# Patient Record
Sex: FEMALE | Race: WHITE | NOT HISPANIC OR LATINO | Employment: OTHER | URBAN - METROPOLITAN AREA
[De-identification: names, ages, dates, MRNs, and addresses within clinical notes are randomized per-mention and may not be internally consistent; named-entity substitution may affect disease eponyms.]

---

## 2018-06-07 ENCOUNTER — OFFICE VISIT (OUTPATIENT)
Dept: URGENT CARE | Facility: CLINIC | Age: 78
End: 2018-06-07
Payer: COMMERCIAL

## 2018-06-07 VITALS
SYSTOLIC BLOOD PRESSURE: 158 MMHG | RESPIRATION RATE: 16 BRPM | TEMPERATURE: 98.8 F | OXYGEN SATURATION: 96 % | HEART RATE: 72 BPM | DIASTOLIC BLOOD PRESSURE: 77 MMHG

## 2018-06-07 DIAGNOSIS — J20.9 ACUTE BRONCHITIS, UNSPECIFIED ORGANISM: Primary | ICD-10-CM

## 2018-06-07 PROCEDURE — 99213 OFFICE O/P EST LOW 20 MIN: CPT | Performed by: PHYSICIAN ASSISTANT

## 2018-06-07 RX ORDER — AZITHROMYCIN 250 MG/1
TABLET, FILM COATED ORAL
Qty: 6 TABLET | Refills: 0 | Status: SHIPPED | OUTPATIENT
Start: 2018-06-07 | End: 2018-06-11

## 2018-06-07 RX ORDER — ALBUTEROL SULFATE 90 UG/1
2 AEROSOL, METERED RESPIRATORY (INHALATION) EVERY 4 HOURS PRN
Qty: 1 INHALER | Refills: 0 | Status: SHIPPED | OUTPATIENT
Start: 2018-06-07 | End: 2018-07-07

## 2018-06-07 NOTE — PATIENT INSTRUCTIONS

## 2018-06-07 NOTE — PROGRESS NOTES
[facility]        NAME: Jesusita Thomas is a 78 y o  female  : 1940    MRN: [id number]  DATE: 2018  TIME: 4:31 PM    Assessment and Plan   Acute bronchitis, unspecified organism [J20 9]  1  Acute bronchitis, unspecified organism  azithromycin (ZITHROMAX) 250 mg tablet    albuterol (PROVENTIL HFA,VENTOLIN HFA) 90 mcg/act inhaler    Spacer Device for Inhaler         Patient Instructions       Follow up with PCP in 3-5 days  Proceed to  ER if symptoms worsen  Chief Complaint     Chief Complaint   Patient presents with    Cough     Pt c/o a cough and congestion since yesterday  History of Present Illness       Patient presents with 3 day history of a cough  She states the cough is productive of colored mucus intermittently  She denies any chest pain shortness of breath dyspnea on exertion  Her daughter last night stated that she did hear some wheezing and her cough got worse while sleeping  She also stated that she felt warm last night and had possible fever  Review of Systems   Review of Systems   Constitutional: Negative for activity change, appetite change and chills  HENT: Positive for congestion  Negative for ear pain, postnasal drip, rhinorrhea, sinus pressure, sore throat and trouble swallowing  Eyes: Negative for redness  Respiratory: Positive for cough and wheezing  Negative for chest tightness and shortness of breath  Cardiovascular: Negative for chest pain and palpitations  Gastrointestinal: Negative for abdominal pain, diarrhea, nausea and vomiting  Musculoskeletal: Negative for myalgias  Skin: Negative for rash  Neurological: Negative for dizziness and headaches  Hematological: Negative for adenopathy           Current Medications       Current Outpatient Prescriptions:     TEMAZEPAM PO, Take by mouth, Disp: , Rfl:     albuterol (PROVENTIL HFA,VENTOLIN HFA) 90 mcg/act inhaler, Inhale 2 puffs every 4 (four) hours as needed for wheezing or shortness of breath for up to 30 days, Disp: 1 Inhaler, Rfl: 0    azithromycin (ZITHROMAX) 250 mg tablet, Take 2 tablets today then 1 tablet daily x 4 days, Disp: 6 tablet, Rfl: 0    Current Allergies     Allergies as of 06/07/2018 - Reviewed 06/07/2018   Allergen Reaction Noted    Penicillins  06/07/2018            The following portions of the patient's history were reviewed and updated as appropriate: allergies, current medications, past family history, past medical history, past social history, past surgical history and problem list      No past medical history on file  No past surgical history on file  No family history on file  Medications have been verified  Objective   /77   Pulse 72   Temp 98 8 °F (37 1 °C)   Resp 16   SpO2 96%        Physical Exam     Physical Exam   Constitutional: She is oriented to person, place, and time  She appears well-developed and well-nourished  HENT:   Head: Normocephalic and atraumatic  Right Ear: External ear normal    Nose: Nose normal    Mouth/Throat: Oropharynx is clear and moist    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

## 2018-09-24 ENCOUNTER — APPOINTMENT (OUTPATIENT)
Dept: LAB | Facility: HOSPITAL | Age: 78
End: 2018-09-24
Payer: COMMERCIAL

## 2018-09-24 ENCOUNTER — TRANSCRIBE ORDERS (OUTPATIENT)
Dept: ADMINISTRATIVE | Facility: HOSPITAL | Age: 78
End: 2018-09-24

## 2018-09-24 DIAGNOSIS — I10 ESSENTIAL HYPERTENSION, MALIGNANT: ICD-10-CM

## 2018-09-24 DIAGNOSIS — F45.8 ANXIETY HYPERVENTILATION: ICD-10-CM

## 2018-09-24 DIAGNOSIS — I10 ESSENTIAL HYPERTENSION, MALIGNANT: Primary | ICD-10-CM

## 2018-09-24 DIAGNOSIS — F41.9 ANXIETY HYPERVENTILATION: ICD-10-CM

## 2018-09-24 LAB
25(OH)D3 SERPL-MCNC: 22.2 NG/ML (ref 30–100)
ALBUMIN SERPL BCP-MCNC: 4.2 G/DL (ref 3.5–5.7)
ALP SERPL-CCNC: 64 U/L (ref 55–165)
ALT SERPL W P-5'-P-CCNC: 10 U/L (ref 7–52)
ANION GAP SERPL CALCULATED.3IONS-SCNC: 9 MMOL/L (ref 4–13)
AST SERPL W P-5'-P-CCNC: 25 U/L (ref 13–39)
BILIRUB SERPL-MCNC: 0.6 MG/DL (ref 0.2–1)
BUN SERPL-MCNC: 16 MG/DL (ref 7–25)
CALCIUM SERPL-MCNC: 9.7 MG/DL (ref 8.6–10.5)
CHLORIDE SERPL-SCNC: 105 MMOL/L (ref 98–107)
CHOLEST SERPL-MCNC: 174 MG/DL (ref 0–200)
CO2 SERPL-SCNC: 28 MMOL/L (ref 21–31)
CREAT SERPL-MCNC: 0.96 MG/DL (ref 0.6–1.2)
ERYTHROCYTE [DISTWIDTH] IN BLOOD BY AUTOMATED COUNT: 13.9 % (ref 11.5–14.5)
GFR SERPL CREATININE-BSD FRML MDRD: 58 ML/MIN/1.73SQ M
GLUCOSE P FAST SERPL-MCNC: 96 MG/DL (ref 65–99)
HCT VFR BLD AUTO: 41.3 % (ref 34.8–46.1)
HDLC SERPL-MCNC: 44 MG/DL (ref 40–60)
HGB BLD-MCNC: 13.8 G/DL (ref 12–16)
LDLC SERPL CALC-MCNC: 105 MG/DL (ref 75–193)
MCH RBC QN AUTO: 30.4 PG (ref 26–34)
MCHC RBC AUTO-ENTMCNC: 33.6 G/DL (ref 31–37)
MCV RBC AUTO: 91 FL (ref 81–99)
NONHDLC SERPL-MCNC: 130 MG/DL
PLATELET # BLD AUTO: 217 THOUSANDS/UL (ref 149–390)
PMV BLD AUTO: 8.3 FL (ref 8.6–11.7)
POTASSIUM SERPL-SCNC: 3.9 MMOL/L (ref 3.5–5.5)
PROT SERPL-MCNC: 6.8 G/DL (ref 6.4–8.9)
RBC # BLD AUTO: 4.55 MILLION/UL (ref 3.9–5.2)
SODIUM SERPL-SCNC: 142 MMOL/L (ref 134–143)
TRIGL SERPL-MCNC: 126 MG/DL (ref 44–166)
TSH SERPL DL<=0.05 MIU/L-ACNC: 1.66 UIU/ML (ref 0.45–5.33)
WBC # BLD AUTO: 6 THOUSAND/UL (ref 4.8–10.8)

## 2018-09-24 PROCEDURE — 80061 LIPID PANEL: CPT

## 2018-09-24 PROCEDURE — 36415 COLL VENOUS BLD VENIPUNCTURE: CPT

## 2018-09-24 PROCEDURE — 80053 COMPREHEN METABOLIC PANEL: CPT

## 2018-09-24 PROCEDURE — 84443 ASSAY THYROID STIM HORMONE: CPT

## 2018-09-24 PROCEDURE — 85027 COMPLETE CBC AUTOMATED: CPT

## 2018-09-24 PROCEDURE — 82306 VITAMIN D 25 HYDROXY: CPT

## 2019-11-14 ENCOUNTER — HOSPITAL ENCOUNTER (INPATIENT)
Facility: HOSPITAL | Age: 79
LOS: 6 days | Discharge: NURSING, REHAB OR LONG-TERM CARE | DRG: 480 | End: 2019-11-20
Attending: EMERGENCY MEDICINE | Admitting: HOSPITALIST
Payer: COMMERCIAL

## 2019-11-14 ENCOUNTER — APPOINTMENT (EMERGENCY)
Dept: RADIOLOGY | Facility: HOSPITAL | Age: 79
DRG: 480 | End: 2019-11-14
Payer: COMMERCIAL

## 2019-11-14 ENCOUNTER — APPOINTMENT (EMERGENCY)
Dept: CT IMAGING | Facility: HOSPITAL | Age: 79
DRG: 480 | End: 2019-11-14
Payer: COMMERCIAL

## 2019-11-14 DIAGNOSIS — S72.009A HIP FRACTURE (HCC): Primary | ICD-10-CM

## 2019-11-14 DIAGNOSIS — G47.00 INSOMNIA, UNSPECIFIED TYPE: ICD-10-CM

## 2019-11-14 DIAGNOSIS — I10 BENIGN ESSENTIAL HTN: Chronic | ICD-10-CM

## 2019-11-14 DIAGNOSIS — S72.002A CLOSED FRACTURE OF LEFT HIP, INITIAL ENCOUNTER (HCC): ICD-10-CM
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
## 2019-11-14 LAB
ANION GAP SERPL CALCULATED.3IONS-SCNC: 13 MMOL/L (ref 4–13)
BUN SERPL-MCNC: 13 MG/DL (ref 7–25)
CALCIUM SERPL-MCNC: 9.6 MG/DL (ref 8.6–10.5)
CHLORIDE SERPL-SCNC: 105 MMOL/L (ref 98–107)
CO2 SERPL-SCNC: 24 MMOL/L (ref 21–31)
CREAT SERPL-MCNC: 1.01 MG/DL (ref 0.6–1.2)
ERYTHROCYTE [DISTWIDTH] IN BLOOD BY AUTOMATED COUNT: 14 % (ref 11.5–14.5)
GFR SERPL CREATININE-BSD FRML MDRD: 54 ML/MIN/1.73SQ M
GLUCOSE SERPL-MCNC: 154 MG/DL (ref 65–99)
HCT VFR BLD AUTO: 36.9 % (ref 42–47)
HGB BLD-MCNC: 12.4 G/DL (ref 12–16)
MCH RBC QN AUTO: 30.4 PG (ref 26–34)
MCHC RBC AUTO-ENTMCNC: 33.7 G/DL (ref 31–37)
MCV RBC AUTO: 90 FL (ref 81–99)
PLATELET # BLD AUTO: 207 THOUSANDS/UL (ref 149–390)
PMV BLD AUTO: 8.9 FL (ref 8.6–11.7)
POTASSIUM SERPL-SCNC: 4.2 MMOL/L (ref 3.5–5.5)
RBC # BLD AUTO: 4.09 MILLION/UL (ref 3.9–5.2)
SODIUM SERPL-SCNC: 142 MMOL/L (ref 134–143)
WBC # BLD AUTO: 10.1 THOUSAND/UL (ref 4.8–10.8)

## 2019-11-14 PROCEDURE — 73502 X-RAY EXAM HIP UNI 2-3 VIEWS: CPT

## 2019-11-14 PROCEDURE — 80048 BASIC METABOLIC PNL TOTAL CA: CPT | Performed by: NURSE PRACTITIONER

## 2019-11-14 PROCEDURE — 99285 EMERGENCY DEPT VISIT HI MDM: CPT | Performed by: NURSE PRACTITIONER

## 2019-11-14 PROCEDURE — 73700 CT LOWER EXTREMITY W/O DYE: CPT

## 2019-11-14 PROCEDURE — 99223 1ST HOSP IP/OBS HIGH 75: CPT | Performed by: PHYSICIAN ASSISTANT

## 2019-11-14 PROCEDURE — 85027 COMPLETE CBC AUTOMATED: CPT | Performed by: PHYSICIAN ASSISTANT

## 2019-11-14 PROCEDURE — 73560 X-RAY EXAM OF KNEE 1 OR 2: CPT

## 2019-11-14 PROCEDURE — 36415 COLL VENOUS BLD VENIPUNCTURE: CPT | Performed by: NURSE PRACTITIONER

## 2019-11-14 PROCEDURE — 99285 EMERGENCY DEPT VISIT HI MDM: CPT

## 2019-11-14 PROCEDURE — 73620 X-RAY EXAM OF FOOT: CPT

## 2019-11-14 PROCEDURE — 96374 THER/PROPH/DIAG INJ IV PUSH: CPT

## 2019-11-14 RX ORDER — METHOCARBAMOL 500 MG/1
500 TABLET, FILM COATED ORAL ONCE
Status: COMPLETED | OUTPATIENT
Start: 2019-11-14 | End: 2019-11-14

## 2019-11-14 RX ORDER — ONDANSETRON 2 MG/ML
4 INJECTION INTRAMUSCULAR; INTRAVENOUS EVERY 6 HOURS PRN
Status: DISCONTINUED | OUTPATIENT
Start: 2019-11-14 | End: 2019-11-20 | Stop reason: HOSPADM

## 2019-11-14 RX ORDER — HYDROCODONE BITARTRATE AND ACETAMINOPHEN 5; 325 MG/1; MG/1
1 TABLET ORAL EVERY 6 HOURS PRN
Status: DISCONTINUED | OUTPATIENT
Start: 2019-11-14 | End: 2019-11-19

## 2019-11-14 RX ORDER — HYDROMORPHONE HCL/PF 1 MG/ML
0.5 SYRINGE (ML) INJECTION
Status: DISCONTINUED | OUTPATIENT
Start: 2019-11-14 | End: 2019-11-14

## 2019-11-14 RX ORDER — ACETAMINOPHEN 325 MG/1
975 TABLET ORAL EVERY 8 HOURS SCHEDULED
Status: DISCONTINUED | OUTPATIENT
Start: 2019-11-14 | End: 2019-11-18

## 2019-11-14 RX ORDER — BENAZEPRIL HYDROCHLORIDE 10 MG/1
10 TABLET ORAL DAILY
Status: DISCONTINUED | OUTPATIENT
Start: 2019-11-15 | End: 2019-11-15

## 2019-11-14 RX ORDER — GABAPENTIN 100 MG/1
100 CAPSULE ORAL 3 TIMES DAILY
Status: DISCONTINUED | OUTPATIENT
Start: 2019-11-14 | End: 2019-11-20 | Stop reason: HOSPADM

## 2019-11-14 RX ORDER — ACETAMINOPHEN 325 MG/1
650 TABLET ORAL EVERY 6 HOURS PRN
Status: DISCONTINUED | OUTPATIENT
Start: 2019-11-14 | End: 2019-11-20 | Stop reason: HOSPADM

## 2019-11-14 RX ORDER — SODIUM CHLORIDE 9 MG/ML
75 INJECTION, SOLUTION INTRAVENOUS CONTINUOUS
Status: DISCONTINUED | OUTPATIENT
Start: 2019-11-15 | End: 2019-11-15

## 2019-11-14 RX ADMIN — HYDROMORPHONE HYDROCHLORIDE 0.5 MG: 1 INJECTION, SOLUTION INTRAMUSCULAR; INTRAVENOUS; SUBCUTANEOUS at 16:57

## 2019-11-14 RX ADMIN — HYDROMORPHONE HYDROCHLORIDE 0.5 MG: 1 INJECTION, SOLUTION INTRAMUSCULAR; INTRAVENOUS; SUBCUTANEOUS at 22:58

## 2019-11-14 RX ADMIN — HYDROMORPHONE HYDROCHLORIDE 0.5 MG: 1 INJECTION, SOLUTION INTRAMUSCULAR; INTRAVENOUS; SUBCUTANEOUS at 15:30

## 2019-11-14 RX ADMIN — ACETAMINOPHEN 975 MG: 325 TABLET ORAL at 23:00

## 2019-11-14 RX ADMIN — ACETAMINOPHEN 975 MG: 325 TABLET ORAL at 13:50

## 2019-11-14 RX ADMIN — METHOCARBAMOL TABLETS 500 MG: 500 TABLET, COATED ORAL at 13:50

## 2019-11-14 RX ADMIN — SODIUM CHLORIDE 75 ML/HR: 9 INJECTION, SOLUTION INTRAVENOUS at 16:58

## 2019-11-14 RX ADMIN — SODIUM CHLORIDE 75 ML/HR: 9 INJECTION, SOLUTION INTRAVENOUS at 20:21

## 2019-11-14 RX ADMIN — GABAPENTIN 100 MG: 100 CAPSULE ORAL at 20:25

## 2019-11-14 NOTE — ED PROVIDER NOTES
History  Chief Complaint   Patient presents with    Fall     fall while at [address]  left hip and upper leg pain  69 y/o female at [address] and stepped on a bottom shelf to reach an item  Abby Kaplan backwards and landed on her left hip  Immediate pain, no blood thinners, previous surgery on left knee, HTN, otherwise healthy  No complaint of neck or back pain  Also complaining of left foot pain, pulses are palpable  Spoke with daughter regarding mothers condition, she resides with her and stated she does no tolerate opiods  Prior to Admission Medications   Prescriptions Last Dose Informant Patient Reported? Taking? BENAZEPRIL HCL PO   Yes Yes   Sig: Take by mouth   TEMAZEPAM PO   Yes No   Sig: Take by mouth      Facility-Administered Medications: None       Past Medical History:   Diagnosis Date    Hypertension        History reviewed  No pertinent surgical history  History reviewed  No pertinent family history  I have reviewed and agree with the history as documented  Social History     Tobacco Use    Smoking status: Never Smoker    Smokeless tobacco: Never Used   Substance Use Topics    Alcohol use: Not Currently    Drug use: Never        Review of Systems   Constitutional: Positive for activity change  Negative for appetite change, chills, diaphoresis, fatigue, fever and unexpected weight change  Eyes: Negative  Respiratory: Negative  Negative for wheezing  Cardiovascular: Negative  Gastrointestinal: Negative  Endocrine: Negative  Musculoskeletal: Negative for joint swelling, neck pain and neck stiffness  Left hip externally rotated and shortened, patient refusing to move hip secondary to pain  Also complains of left foot pain  Neurovascularly intact   Allergic/Immunologic: Negative  Neurological: Negative  Hematological: Negative  Psychiatric/Behavioral: Negative          Physical Exam  Physical Exam   Constitutional: She is oriented to person, place, and time  She appears well-developed and well-nourished  No distress  HENT:   Head: Normocephalic and atraumatic  Right Ear: External ear normal    Left Ear: External ear normal    Nose: Nose normal    Mouth/Throat: Oropharynx is clear and moist  No oropharyngeal exudate  Eyes: Pupils are equal, round, and reactive to light  Conjunctivae and EOM are normal  Right eye exhibits no discharge  Left eye exhibits no discharge  No scleral icterus  Neck: Normal range of motion  Neck supple  No JVD present  No tracheal deviation present  No thyromegaly present  Cardiovascular: Normal rate, regular rhythm, normal heart sounds and intact distal pulses  Exam reveals no gallop and no friction rub  No murmur heard  Pulmonary/Chest: Effort normal and breath sounds normal  No stridor  No respiratory distress  She has no wheezes  She has no rales  She exhibits no tenderness  Abdominal: Soft  Bowel sounds are normal  She exhibits no distension and no mass  There is no tenderness  There is no rebound and no guarding  No hernia  Genitourinary:   Genitourinary Comments: deferred   Musculoskeletal: She exhibits no edema, tenderness or deformity  Lymphadenopathy:     She has no cervical adenopathy  Neurological: She is alert and oriented to person, place, and time  She displays normal reflexes  No cranial nerve deficit or sensory deficit  She exhibits normal muscle tone  Coordination normal    Skin: Skin is warm and dry  Capillary refill takes less than 2 seconds  No rash noted  She is not diaphoretic  No erythema  No pallor  Psychiatric: She has a normal mood and affect   Her behavior is normal  Judgment and thought content normal        Vital Signs  ED Triage Vitals [11/14/19 1318]   Temperature Pulse Respirations Blood Pressure SpO2   (!) 97 2 °F (36 2 °C) 69 20 (!) 199/88 99 %      Temp Source Heart Rate Source Patient Position - Orthostatic VS BP Location FiO2 (%)   Temporal Monitor Lying Left arm --      Pain Score       9           Vitals:    11/14/19 1318   BP: (!) 199/88   Pulse: 69   Patient Position - Orthostatic VS: Lying         Visual Acuity  Visual Acuity      Most Recent Value   L Pupil Size (mm)  3   R Pupil Size (mm)  3          ED Medications  Medications - No data to display    Diagnostic Studies  Results Reviewed     None                 XR hip/pelv 2-3 vws left if performed    (Results Pending)              Procedures  Procedures       ED Course                               MDM  Number of Diagnoses or Management Options  Diagnosis management comments: 71 y/o female was shopping at [employer] and stood up on bottom shelf to reach for something and fell backwards landing on left hip resulting in a comminuted fracture of the intertrochanteric femoral neck  Pain currently controlled and is resting  Spoke with Dr Taye Cortez who is aware of the patient and asked for her to be admitted, going to Dr Laurel Parmar service and ORtho will evaluate in the morning  Amount and/or Complexity of Data Reviewed  Tests in the radiology section of CPT®: reviewed        Disposition  Final diagnoses:   None     ED Disposition     None      Follow-up Information    None         Patient's Medications   Discharge Prescriptions    No medications on file     No discharge procedures on file      ED Provider  Electronically Signed by           WAGNER Amor  11/14/19 0797

## 2019-11-15 ENCOUNTER — ANESTHESIA EVENT (INPATIENT)
Dept: PERIOP | Facility: HOSPITAL | Age: 79
DRG: 480 | End: 2019-11-15
Payer: COMMERCIAL

## 2019-11-15 ENCOUNTER — ANESTHESIA (INPATIENT)
Dept: PERIOP | Facility: HOSPITAL | Age: 79
DRG: 480 | End: 2019-11-15
Payer: COMMERCIAL

## 2019-11-15 ENCOUNTER — APPOINTMENT (INPATIENT)
Dept: RADIOLOGY | Facility: HOSPITAL | Age: 79
DRG: 480 | End: 2019-11-15
Payer: COMMERCIAL

## 2019-11-15 ENCOUNTER — ANESTHESIA (OUTPATIENT)
Dept: ANESTHESIOLOGY | Facility: HOSPITAL | Age: 79
End: 2019-11-15

## 2019-11-15 ENCOUNTER — TELEPHONE (OUTPATIENT)
Dept: OBGYN CLINIC | Facility: HOSPITAL | Age: 79
End: 2019-11-15

## 2019-11-15 ENCOUNTER — ANESTHESIA EVENT (OUTPATIENT)
Dept: ANESTHESIOLOGY | Facility: HOSPITAL | Age: 79
End: 2019-11-15

## 2019-11-15 PROBLEM — D62 ACUTE BLOOD LOSS ANEMIA: Status: ACTIVE | Noted: 2019-11-15

## 2019-11-15 PROBLEM — S72.002A CLOSED FRACTURE OF LEFT HIP (HCC): Status: ACTIVE | Noted: 2019-11-14

## 2019-11-15 LAB
ABO GROUP BLD: NORMAL
ATRIAL RATE: 68 BPM
BLD GP AB SCN SERPL QL: NEGATIVE
ERYTHROCYTE [DISTWIDTH] IN BLOOD BY AUTOMATED COUNT: 13.9 % (ref 11.5–14.5)
ERYTHROCYTE [DISTWIDTH] IN BLOOD BY AUTOMATED COUNT: 14.1 % (ref 11.5–14.5)
HCT VFR BLD AUTO: 24.3 % (ref 42–47)
HCT VFR BLD AUTO: 31.4 % (ref 42–47)
HCT VFR BLD AUTO: 34.2 % (ref 42–47)
HGB BLD-MCNC: 10.4 G/DL (ref 12–16)
HGB BLD-MCNC: 11.6 G/DL (ref 12–16)
HGB BLD-MCNC: 8 G/DL (ref 12–16)
MCH RBC QN AUTO: 30.4 PG (ref 26–34)
MCH RBC QN AUTO: 30.5 PG (ref 26–34)
MCHC RBC AUTO-ENTMCNC: 32.9 G/DL (ref 31–37)
MCHC RBC AUTO-ENTMCNC: 34 G/DL (ref 31–37)
MCV RBC AUTO: 90 FL (ref 81–99)
MCV RBC AUTO: 92 FL (ref 81–99)
P AXIS: 29 DEGREES
PLATELET # BLD AUTO: 185 THOUSANDS/UL (ref 149–390)
PLATELET # BLD AUTO: 198 THOUSANDS/UL (ref 149–390)
PMV BLD AUTO: 8.4 FL (ref 8.6–11.7)
PMV BLD AUTO: 8.5 FL (ref 8.6–11.7)
PR INTERVAL: 134 MS
QRS AXIS: 1 DEGREES
QRSD INTERVAL: 84 MS
QT INTERVAL: 388 MS
QTC INTERVAL: 412 MS
RBC # BLD AUTO: 2.63 MILLION/UL (ref 3.9–5.2)
RBC # BLD AUTO: 3.81 MILLION/UL (ref 3.9–5.2)
RH BLD: POSITIVE
SPECIMEN EXPIRATION DATE: NORMAL
T WAVE AXIS: 19 DEGREES
VENTRICULAR RATE: 68 BPM
WBC # BLD AUTO: 16.5 THOUSAND/UL (ref 4.8–10.8)
WBC # BLD AUTO: 9 THOUSAND/UL (ref 4.8–10.8)

## 2019-11-15 PROCEDURE — C1713 ANCHOR/SCREW BN/BN,TIS/BN: HCPCS | Performed by: ORTHOPAEDIC SURGERY

## 2019-11-15 PROCEDURE — 0QS706Z REPOSITION LEFT UPPER FEMUR WITH INTRAMEDULLARY INTERNAL FIXATION DEVICE, OPEN APPROACH: ICD-10-PCS | Performed by: ORTHOPAEDIC SURGERY

## 2019-11-15 PROCEDURE — 93005 ELECTROCARDIOGRAM TRACING: CPT

## 2019-11-15 PROCEDURE — 85027 COMPLETE CBC AUTOMATED: CPT | Performed by: NURSE PRACTITIONER

## 2019-11-15 PROCEDURE — 30233N1 TRANSFUSION OF NONAUTOLOGOUS RED BLOOD CELLS INTO PERIPHERAL VEIN, PERCUTANEOUS APPROACH: ICD-10-PCS | Performed by: HOSPITALIST

## 2019-11-15 PROCEDURE — 85018 HEMOGLOBIN: CPT | Performed by: NURSE PRACTITIONER

## 2019-11-15 PROCEDURE — 73502 X-RAY EXAM HIP UNI 2-3 VIEWS: CPT | Performed by: ORTHOPAEDIC SURGERY

## 2019-11-15 PROCEDURE — 86850 RBC ANTIBODY SCREEN: CPT | Performed by: ORTHOPAEDIC SURGERY

## 2019-11-15 PROCEDURE — 93010 ELECTROCARDIOGRAM REPORT: CPT | Performed by: INTERNAL MEDICINE

## 2019-11-15 PROCEDURE — 99232 SBSQ HOSP IP/OBS MODERATE 35: CPT | Performed by: NURSE PRACTITIONER

## 2019-11-15 PROCEDURE — 30233N1 TRANSFUSION OF NONAUTOLOGOUS RED BLOOD CELLS INTO PERIPHERAL VEIN, PERCUTANEOUS APPROACH: ICD-10-PCS | Performed by: ANESTHESIOLOGY

## 2019-11-15 PROCEDURE — 73502 X-RAY EXAM HIP UNI 2-3 VIEWS: CPT

## 2019-11-15 PROCEDURE — 86920 COMPATIBILITY TEST SPIN: CPT

## 2019-11-15 PROCEDURE — 86901 BLOOD TYPING SEROLOGIC RH(D): CPT | Performed by: ORTHOPAEDIC SURGERY

## 2019-11-15 PROCEDURE — 86900 BLOOD TYPING SEROLOGIC ABO: CPT | Performed by: ORTHOPAEDIC SURGERY

## 2019-11-15 PROCEDURE — P9016 RBC LEUKOCYTES REDUCED: HCPCS

## 2019-11-15 PROCEDURE — 27245 TREAT THIGH FRACTURE: CPT | Performed by: ORTHOPAEDIC SURGERY

## 2019-11-15 PROCEDURE — 99222 1ST HOSP IP/OBS MODERATE 55: CPT | Performed by: ORTHOPAEDIC SURGERY

## 2019-11-15 PROCEDURE — 85014 HEMATOCRIT: CPT | Performed by: NURSE PRACTITIONER

## 2019-11-15 PROCEDURE — 85027 COMPLETE CBC AUTOMATED: CPT | Performed by: ANESTHESIOLOGY

## 2019-11-15 PROCEDURE — C1769 GUIDE WIRE: HCPCS | Performed by: ORTHOPAEDIC SURGERY

## 2019-11-15 DEVICE — TFNA HELICAL BLADE 95MM
Type: IMPLANTABLE DEVICE | Site: LEG | Status: FUNCTIONAL
Brand: TFN-ADVANCE

## 2019-11-15 DEVICE — 11MM/125 DEG TI CANN TROCH FIXATION NAIL 380MM/LEFT-STER: Type: IMPLANTABLE DEVICE | Site: LEG | Status: FUNCTIONAL

## 2019-11-15 DEVICE — 5.0MM TI LOCKING SCREW 38MM- FOR IM NAILS: Type: IMPLANTABLE DEVICE | Site: LEG | Status: FUNCTIONAL

## 2019-11-15 RX ORDER — EPHEDRINE SULFATE 50 MG/ML
INJECTION INTRAVENOUS AS NEEDED
Status: DISCONTINUED | OUTPATIENT
Start: 2019-11-15 | End: 2019-11-15 | Stop reason: SURG

## 2019-11-15 RX ORDER — EPHEDRINE SULFATE 50 MG/ML
10 INJECTION INTRAVENOUS
Status: DISCONTINUED | OUTPATIENT
Start: 2019-11-15 | End: 2019-11-16 | Stop reason: HOSPADM

## 2019-11-15 RX ORDER — DEXAMETHASONE SODIUM PHOSPHATE 10 MG/ML
INJECTION, SOLUTION INTRAMUSCULAR; INTRAVENOUS AS NEEDED
Status: DISCONTINUED | OUTPATIENT
Start: 2019-11-15 | End: 2019-11-15 | Stop reason: SURG

## 2019-11-15 RX ORDER — HYDROMORPHONE HCL/PF 1 MG/ML
0.5 SYRINGE (ML) INJECTION
Status: DISCONTINUED | OUTPATIENT
Start: 2019-11-15 | End: 2019-11-15 | Stop reason: HOSPADM

## 2019-11-15 RX ORDER — SODIUM CHLORIDE, SODIUM LACTATE, POTASSIUM CHLORIDE, CALCIUM CHLORIDE 600; 310; 30; 20 MG/100ML; MG/100ML; MG/100ML; MG/100ML
100 INJECTION, SOLUTION INTRAVENOUS CONTINUOUS
Status: DISCONTINUED | OUTPATIENT
Start: 2019-11-15 | End: 2019-11-15

## 2019-11-15 RX ORDER — SODIUM CHLORIDE, SODIUM LACTATE, POTASSIUM CHLORIDE, CALCIUM CHLORIDE 600; 310; 30; 20 MG/100ML; MG/100ML; MG/100ML; MG/100ML
INJECTION, SOLUTION INTRAVENOUS CONTINUOUS PRN
Status: DISCONTINUED | OUTPATIENT
Start: 2019-11-15 | End: 2019-11-15 | Stop reason: SURG

## 2019-11-15 RX ORDER — LIDOCAINE HYDROCHLORIDE 20 MG/ML
INJECTION, SOLUTION EPIDURAL; INFILTRATION; INTRACAUDAL; PERINEURAL AS NEEDED
Status: DISCONTINUED | OUTPATIENT
Start: 2019-11-15 | End: 2019-11-15 | Stop reason: SURG

## 2019-11-15 RX ORDER — BUPIVACAINE HYDROCHLORIDE AND EPINEPHRINE 5; 5 MG/ML; UG/ML
INJECTION, SOLUTION EPIDURAL; INTRACAUDAL; PERINEURAL AS NEEDED
Status: DISCONTINUED | OUTPATIENT
Start: 2019-11-15 | End: 2019-11-15 | Stop reason: HOSPADM

## 2019-11-15 RX ORDER — SODIUM CHLORIDE, SODIUM LACTATE, POTASSIUM CHLORIDE, CALCIUM CHLORIDE 600; 310; 30; 20 MG/100ML; MG/100ML; MG/100ML; MG/100ML
125 INJECTION, SOLUTION INTRAVENOUS CONTINUOUS
Status: DISCONTINUED | OUTPATIENT
Start: 2019-11-15 | End: 2019-11-15

## 2019-11-15 RX ORDER — CEFAZOLIN SODIUM 2 G/50ML
2000 SOLUTION INTRAVENOUS ONCE
Status: COMPLETED | OUTPATIENT
Start: 2019-11-15 | End: 2019-11-16

## 2019-11-15 RX ORDER — MIDAZOLAM HYDROCHLORIDE 2 MG/2ML
INJECTION, SOLUTION INTRAMUSCULAR; INTRAVENOUS AS NEEDED
Status: DISCONTINUED | OUTPATIENT
Start: 2019-11-15 | End: 2019-11-15 | Stop reason: SURG

## 2019-11-15 RX ORDER — SODIUM CHLORIDE, SODIUM LACTATE, POTASSIUM CHLORIDE, CALCIUM CHLORIDE 600; 310; 30; 20 MG/100ML; MG/100ML; MG/100ML; MG/100ML
INJECTION, SOLUTION INTRAVENOUS CONTINUOUS PRN
Status: DISCONTINUED | OUTPATIENT
Start: 2019-11-15 | End: 2019-11-15

## 2019-11-15 RX ORDER — ROCURONIUM BROMIDE 10 MG/ML
INJECTION, SOLUTION INTRAVENOUS AS NEEDED
Status: DISCONTINUED | OUTPATIENT
Start: 2019-11-15 | End: 2019-11-15 | Stop reason: SURG

## 2019-11-15 RX ORDER — ONDANSETRON 2 MG/ML
INJECTION INTRAMUSCULAR; INTRAVENOUS AS NEEDED
Status: DISCONTINUED | OUTPATIENT
Start: 2019-11-15 | End: 2019-11-15 | Stop reason: SURG

## 2019-11-15 RX ORDER — FENTANYL CITRATE/PF 50 MCG/ML
50 SYRINGE (ML) INJECTION 2 TIMES DAILY PRN
Status: DISCONTINUED | OUTPATIENT
Start: 2019-11-15 | End: 2019-11-15 | Stop reason: HOSPADM

## 2019-11-15 RX ORDER — LISINOPRIL 20 MG/1
20 TABLET ORAL DAILY
Status: DISCONTINUED | OUTPATIENT
Start: 2019-11-15 | End: 2019-11-15

## 2019-11-15 RX ORDER — CEFAZOLIN SODIUM 1 G/3ML
INJECTION, POWDER, FOR SOLUTION INTRAMUSCULAR; INTRAVENOUS AS NEEDED
Status: DISCONTINUED | OUTPATIENT
Start: 2019-11-15 | End: 2019-11-15 | Stop reason: SURG

## 2019-11-15 RX ORDER — CEFAZOLIN SODIUM 2 G/50ML
2000 SOLUTION INTRAVENOUS EVERY 8 HOURS
Status: DISCONTINUED | OUTPATIENT
Start: 2019-11-16 | End: 2019-11-16

## 2019-11-15 RX ORDER — MEPERIDINE HYDROCHLORIDE 50 MG/ML
12.5 INJECTION INTRAMUSCULAR; INTRAVENOUS; SUBCUTANEOUS
Status: DISCONTINUED | OUTPATIENT
Start: 2019-11-15 | End: 2019-11-15 | Stop reason: HOSPADM

## 2019-11-15 RX ORDER — PROPOFOL 10 MG/ML
INJECTION, EMULSION INTRAVENOUS AS NEEDED
Status: DISCONTINUED | OUTPATIENT
Start: 2019-11-15 | End: 2019-11-15 | Stop reason: SURG

## 2019-11-15 RX ORDER — ONDANSETRON 2 MG/ML
4 INJECTION INTRAMUSCULAR; INTRAVENOUS ONCE AS NEEDED
Status: DISCONTINUED | OUTPATIENT
Start: 2019-11-15 | End: 2019-11-15 | Stop reason: HOSPADM

## 2019-11-15 RX ORDER — KETOROLAC TROMETHAMINE 30 MG/ML
15 INJECTION, SOLUTION INTRAMUSCULAR; INTRAVENOUS ONCE AS NEEDED
Status: DISCONTINUED | OUTPATIENT
Start: 2019-11-15 | End: 2019-11-15 | Stop reason: HOSPADM

## 2019-11-15 RX ORDER — SODIUM CHLORIDE, SODIUM LACTATE, POTASSIUM CHLORIDE, CALCIUM CHLORIDE 600; 310; 30; 20 MG/100ML; MG/100ML; MG/100ML; MG/100ML
100 INJECTION, SOLUTION INTRAVENOUS CONTINUOUS
Status: DISCONTINUED | OUTPATIENT
Start: 2019-11-15 | End: 2019-11-16

## 2019-11-15 RX ORDER — FENTANYL CITRATE 50 UG/ML
INJECTION, SOLUTION INTRAMUSCULAR; INTRAVENOUS AS NEEDED
Status: DISCONTINUED | OUTPATIENT
Start: 2019-11-15 | End: 2019-11-15 | Stop reason: SURG

## 2019-11-15 RX ADMIN — EPHEDRINE SULFATE 10 MG: 50 INJECTION, SOLUTION INTRAVENOUS at 13:32

## 2019-11-15 RX ADMIN — SODIUM CHLORIDE, SODIUM LACTATE, POTASSIUM CHLORIDE, AND CALCIUM CHLORIDE: .6; .31; .03; .02 INJECTION, SOLUTION INTRAVENOUS at 14:50

## 2019-11-15 RX ADMIN — PHENYLEPHRINE HYDROCHLORIDE 100 MCG: 10 INJECTION INTRAVENOUS at 14:35

## 2019-11-15 RX ADMIN — SODIUM CHLORIDE 75 ML/HR: 9 INJECTION, SOLUTION INTRAVENOUS at 09:26

## 2019-11-15 RX ADMIN — SUGAMMADEX 200 MG: 100 INJECTION, SOLUTION INTRAVENOUS at 14:55

## 2019-11-15 RX ADMIN — CEFAZOLIN 2000 MG: 1 INJECTION, POWDER, FOR SOLUTION INTRAMUSCULAR; INTRAVENOUS at 13:09

## 2019-11-15 RX ADMIN — SODIUM CHLORIDE, POTASSIUM CHLORIDE, SODIUM LACTATE AND CALCIUM CHLORIDE 100 ML/HR: 600; 310; 30; 20 INJECTION, SOLUTION INTRAVENOUS at 19:32

## 2019-11-15 RX ADMIN — GABAPENTIN 100 MG: 100 CAPSULE ORAL at 20:47

## 2019-11-15 RX ADMIN — ROCURONIUM BROMIDE 10 MG: 10 INJECTION INTRAVENOUS at 14:06

## 2019-11-15 RX ADMIN — CEFAZOLIN SODIUM 2000 MG: 2 SOLUTION INTRAVENOUS at 19:31

## 2019-11-15 RX ADMIN — ROCURONIUM BROMIDE 40 MG: 10 INJECTION INTRAVENOUS at 13:04

## 2019-11-15 RX ADMIN — FENTANYL CITRATE 50 MCG: 50 INJECTION INTRAMUSCULAR; INTRAVENOUS at 13:20

## 2019-11-15 RX ADMIN — SODIUM CHLORIDE: 9 INJECTION, SOLUTION INTRAVENOUS at 13:52

## 2019-11-15 RX ADMIN — ONDANSETRON 4 MG: 2 INJECTION INTRAMUSCULAR; INTRAVENOUS at 13:51

## 2019-11-15 RX ADMIN — NOREPINEPHRINE BITARTRATE 5 MCG/MIN: 1 INJECTION INTRAVENOUS at 19:33

## 2019-11-15 RX ADMIN — EPHEDRINE SULFATE 10 MG: 50 INJECTION, SOLUTION INTRAVENOUS at 16:37

## 2019-11-15 RX ADMIN — FENTANYL CITRATE 50 MCG: 50 INJECTION INTRAMUSCULAR; INTRAVENOUS at 13:04

## 2019-11-15 RX ADMIN — EPHEDRINE SULFATE 10 MG: 50 INJECTION, SOLUTION INTRAVENOUS at 14:03

## 2019-11-15 RX ADMIN — LISINOPRIL 20 MG: 20 TABLET ORAL at 09:26

## 2019-11-15 RX ADMIN — LIDOCAINE HYDROCHLORIDE 100 MG: 20 INJECTION, SOLUTION EPIDURAL; INFILTRATION; INTRACAUDAL; PERINEURAL at 13:04

## 2019-11-15 RX ADMIN — PROPOFOL 100 MG: 10 INJECTION, EMULSION INTRAVENOUS at 13:04

## 2019-11-15 RX ADMIN — ACETAMINOPHEN 975 MG: 325 TABLET ORAL at 20:47

## 2019-11-15 RX ADMIN — PHENYLEPHRINE HYDROCHLORIDE 100 MCG: 10 INJECTION INTRAVENOUS at 14:20

## 2019-11-15 RX ADMIN — MIDAZOLAM HYDROCHLORIDE 2 MG: 1 INJECTION, SOLUTION INTRAMUSCULAR; INTRAVENOUS at 12:59

## 2019-11-15 RX ADMIN — PHENYLEPHRINE HYDROCHLORIDE 100 MCG: 10 INJECTION INTRAVENOUS at 14:45

## 2019-11-15 RX ADMIN — DEXAMETHASONE SODIUM PHOSPHATE 10 MG: 10 INJECTION, SOLUTION INTRAMUSCULAR; INTRAVENOUS at 13:51

## 2019-11-15 RX ADMIN — EPHEDRINE SULFATE 10 MG: 50 INJECTION, SOLUTION INTRAVENOUS at 13:59

## 2019-11-15 NOTE — ASSESSMENT & PLAN NOTE
· Admit to Medicine  · Give pain control p r n   · DVT prophylaxis will be SCDs only at this time till cleared by Orthopedic surgery  · Consult Orthopedics in a m   · NPO after midnight

## 2019-11-15 NOTE — ASSESSMENT & PLAN NOTE
· Secondary to an acute blood loss anemia with a component of a dilutional effect from the IV fluids that the patient was receiving  · Status post 2 units of PRBCs  · Hemoglobin is 8 9  · Patient is now hemodynamically stable  · Continue to monitor H&H

## 2019-11-15 NOTE — TELEPHONE ENCOUNTER
Sent a page to Dr Jaimes How:  Levon Mittal @ [facility] ICU questioning orders    Please call [phone]

## 2019-11-15 NOTE — SOCIAL WORK
CM met with pt at bedside and explained role  CM also spoke with pt daughter Sharri Stevens via phone to discuss discharge planning  Pt lives with her daughter in a trailer, ramp outside  Pt does not use an assistive to device to ambulate  She has RW and w/c at home form previous use  Pt reports she is completely independent with ADLs  She is active and continues to drive  Pt PCP is Dr Stevenson Guadalupe  She uses [employer] for medications and denies any barriers to obtaining them  Pt denies any history of HHC,STR, MH and D&A treatment  Pt s/p fall with left hip fracture  Pt having surgery today  STR will be recommended on discharge  Cm discussed same with pt and daughter Vipin Morin  They are in agreement with same  A post acute care recommendation was made by your care team for STR  Discussed Freedom of Choice with both patient and caregiver  List of facilities given to both patient and caregiver via in person  both patient and caregiver aware the list is custom filtered for them by preference  and that [facility] post acute providers are designated  They chose [facility]  Referral made  Pt and daughter are admant they do not want SNF if [facility] declines  Will take pt home with 24 hour family support and HHC  CM to follow  CM reviewed d/c planning process including the following: identifying help at home, patient preference for d/c planning needs, availability of treatment team to discuss questions or concerns patient and/or family may have regarding understanding medications and recognizing signs and symptoms once discharged  CM also encouraged patient to follow up with all recommended appointments after discharge  Patient advised of importance for patient and family to participate in managing patients medical well being

## 2019-11-15 NOTE — ASSESSMENT & PLAN NOTE
This is simple mechanical falls patient lost her balance while climbing to to something off the top shelf at [facility], will consult PT OT in a m

## 2019-11-15 NOTE — PLAN OF CARE
Problem: Potential for Falls  Goal: Patient will remain free of falls  Description  INTERVENTIONS:  - Assess patient frequently for physical needs  -  Identify cognitive and physical deficits and behaviors that affect risk of falls    -  [place] fall precautions as indicated by assessment   - Educate patient/family on patient safety including physical limitations  - Instruct patient to call for assistance with activity based on assessment  - Modify environment to reduce risk of injury  - Consider OT/PT consult to assist with strengthening/mobility  Outcome: Progressing

## 2019-11-15 NOTE — UTILIZATION REVIEW
Initial Clinical Review    Admission: Date/Time/Statement: Inpatient Admission Orders (From admission, onward)     Ordered        11/14/19 1744  Inpatient Admission  Once         11/14/19 1646  Inpatient Admission  Once                   Orders Placed This Encounter   Procedures    Inpatient Admission     Standing Status:   Standing     Number of Occurrences:   1     Order Specific Question:   Admitting Physician     Answer:   Barbara Simental [[id number]]     Order Specific Question:   Level of Care     Answer:   Med Surg [[id number]]     Order Specific Question:   Estimated length of stay     Answer:   More than 2 Midnights     Order Specific Question:   Certification     Answer:   I certify that inpatient services are medically necessary for this patient for a duration of greater than two midnights  See H&P and MD Progress Notes for additional information about the patient's course of treatment  ED Arrival Information     Expected Arrival Acuity Means of Arrival Escorted By Service Admission Type    - 11/14/2019 13:17 Urgent Ambulance [facility] Urgent    Arrival Complaint    hip pain fell        Chief Complaint   Patient presents with    Fall     fall while at [facility]  left hip and upper leg pain  Assessment/Plan: 70year old female to the ED from store via EMS after sustaining fall, injuring her left hip/thigh  Admitted to inpatient for closed left hip fracture  She was standing on a shelf, at the store, fell back, landing on her left side with immediate pain  UPon arrival to the ED found to have left hip externally rotated and shortened  Good pedal pulses  Given IV dilaudid in the ED with good pain relief  NPO after MN  Orthos consult      ED Triage Vitals [11/14/19 1318]   Temperature Pulse Respirations Blood Pressure SpO2   (!) 97 2 °F (36 2 °C) 69 20 (!) 199/88 99 %      Temp Source Heart Rate Source Patient Position - Orthostatic VS BP Location FiO2 (%)   Temporal Monitor Lying Left arm --      Pain Score       9        Wt Readings from Last 1 Encounters:   11/14/19 63 8 kg (140 lb 10 5 oz)     Additional Vital Signs:   Date/Time  Temp  Pulse  Resp  BP  SpO2  O2 Device  Patient Position - Orthostatic VS   11/15/19 0926        166/70         11/15/19 0638  100 3 °F (37 9 °C)  72  18  169/73  99 %  None (Room air)  Lying   11/14/19 2321  99 5 °F (37 5 °C)  74  18  176/74Abnormal   98 %  None (Room air)  Lying   11/14/19 1915    Jerri Other    None (Room air)     11/14/19 1855  98 8 °F (37 1 °C)  64  18  167/70  100 %         Pertinent Labs/Diagnostic Test Results:   Xray hip/pelvis 11/14:  Comminuted impacted left intertrochanteric femoral neck fracture  No evidence of dislocation  Xray left foot 11/14: Limited evaluation secondary to limited positioning and lack of an oblique view  No evident fracture  If symptoms persist dedicated 3 view foot series should be considered given exam limitations  Xray left knee 11/14: Limited evaluation secondary to positioning  No acute osseous abnormality  CT hip 11/14:  Comminuted impacted fracture of the intertrochanteric left femoral neck with apex anterior and lateral angulation    Results from last 7 days   Lab Units 11/15/19  0532 11/14/19  2229   WBC Thousand/uL 9 00 10 10   HEMOGLOBIN g/dL 11 6* 12 4   HEMATOCRIT % 34 2* 36 9*   PLATELETS Thousands/uL 198 207     Results from last 7 days   Lab Units 11/14/19  1647   SODIUM mmol/L 142   POTASSIUM mmol/L 4 2   CHLORIDE mmol/L 105   CO2 mmol/L 24   ANION GAP mmol/L 13   BUN mg/dL 13   CREATININE mg/dL 1 01   EGFR ml/min/1 73sq m 54   CALCIUM mg/dL 9 6       Results from last 7 days   Lab Units 11/14/19  1647   GLUCOSE RANDOM mg/dL 154*     ED Treatment:   Medication Administration from 11/14/2019 1317 to 11/14/2019 1833       Date/Time Order Dose Route Action     11/14/2019 1350 methocarbamol (ROBAXIN) tablet 500 mg 500 mg Oral Given     11/14/2019 1350 acetaminophen (TYLENOL) tablet 975 mg 975 mg Oral Given     11/14/2019 1657 HYDROmorphone (DILAUDID) injection 0 5 mg 0 5 mg Intravenous Given     11/14/2019 1530 HYDROmorphone (DILAUDID) injection 0 5 mg 0 5 mg Intravenous Given     11/14/2019 1658 sodium chloride 0 9 % infusion 75 mL/hr Intravenous New Bag        Past Medical History:   Diagnosis Date    Hypertension      Admitting Diagnosis: Hip fracture ([facility] [address] ) [S72 009A]  Hip injury [S79 919A]  Age/Sex: 70 y o  female  Admission Orders:  SCDS  Xary hip/pelvis  ONPO  Scheduled Medications:    Medications:  [MAR Hold] acetaminophen 975 mg Oral Q8H [facility]   [MAR Hold] gabapentin 100 mg Oral TID   [MAR Hold] lisinopril 20 mg Oral Daily     Continuous IV Infusions:    sodium chloride 75 mL/hr Intravenous Continuous     PRN Meds:    [MAR Hold] acetaminophen 650 mg Oral Q6H PRN   [MAR Hold] HYDROcodone-acetaminophen 1 tablet Oral Q6H PRN   [MAR Hold] morphine injection 2 mg Intravenous Q4H PRN   [MAR Hold] ondansetron 4 mg Intravenous Q6H PRN       IP CONSULT TO ORTHOPEDIC SURGERY    Network Utilization Review Department  [email or web address]  ATTENTION: Please call with any questions or concerns to [phone] and carefully listen to the prompts so that you are directed to the right person  All voicemails are confidential   Kervin Zaragoza all requests for admission clinical reviews, approved or denied determinations and any other requests to dedicated fax number below belonging to the campus where the patient is receiving treatment    FACILITY NAME UR FAX NUMBER   ADMISSION DENIALS (Administrative/Medical Necessity) [phone]   PARENT CHILD HEALTH (Maternity/NICU/Pediatrics) [phone]   [facility] [phone]   Farooq Grooms [phone]   Nora Superior [address] [facility] [phone]   Contreras Maud [address] [place] [address] [place] [address] [phone]

## 2019-11-15 NOTE — CONSULTS
Consultation - Orthopedics   Jessica Moore 70 y o  female MRN: [id number]  Unit/Bed#: OR [place] Encounter: [id number]      Assessment/Plan     Assessment:  Left hip fracture    Plan:  61-year-old female with left subtrochanteric hip fracture  I spoke with the patient and her son regarding treatment recommendations  I recommended surgical reduction and fixation  Risks and benefits of surgery were discussed with her  Questions were answered  Informed consent was obtained  Plan will be for surgery on the afternoon of November 15th  History of Present Illness      Physician Requesting Consult: Arlene Ruiz MD     Reason for Consult / Principal Problem:  Hip fracture    HPI: Jessica Moore is a 70y o  year old female who presents with pain in her left hip  On November 14, 2019 she fell at [facility] when she was reaching to get something off of a high shelf  She fell directly onto her left hip  She came into the emergency department where she was found to have a left hip fracture and was admitted by Internal Medicine  Her pain has been fairly well controlled  She does not usually use a cane or a walker  She is a community ambulator  She lives with her daughter  Her son is with her in addition to her daughter-in-law  Inpatient consult to Orthopedic Surgery  Consult performed by: Renee Lewis MD  Consult ordered by: Shrieen Ann PA-C          Review of Systems   Constitutional: Negative  HENT: Negative  Eyes: Negative  Respiratory: Negative  Cardiovascular: Negative  Gastrointestinal: Negative  Endocrine: Negative  Genitourinary: Negative  Musculoskeletal: Positive for arthralgias  Skin: Negative  Allergic/Immunologic: Negative  Neurological: Negative  Hematological: Negative  Psychiatric/Behavioral: Negative  Historical Information   Past Medical History:   Diagnosis Date    Hypertension      History reviewed   No pertinent surgical history  Social History   Social History     Substance and Sexual Activity   Alcohol Use Not Currently     Social History     Substance and Sexual Activity   Drug Use Never     Social History     Tobacco Use   Smoking Status Never Smoker   Smokeless Tobacco Never Used     Family History: non-contributory    Meds/Allergies   all current active meds have been reviewed and current meds:   Current Facility-Administered Medications   Medication Dose Route Frequency    [MAR Hold] acetaminophen (TYLENOL) tablet 650 mg  650 mg Oral Q6H PRN    [MAR Hold] acetaminophen (TYLENOL) tablet 975 mg  975 mg Oral Q8H [address]    fentaNYL (SUBLIMAZE) injection 50 mcg  50 mcg Intravenous BID PRN    [MAR Hold] gabapentin (NEURONTIN) capsule 100 mg  100 mg Oral TID    [MAR Hold] HYDROcodone-acetaminophen (NORCO) 5-325 mg per tablet 1 tablet  1 tablet Oral Q6H PRN    HYDROmorphone (DILAUDID) injection 0 5 mg  0 5 mg Intravenous Q5 Min PRN    ketorolac (TORADOL) injection 15 mg  15 mg Intravenous Once PRN    lactated ringers infusion  100 mL/hr Intravenous Continuous    [MAR Hold] lisinopril (ZESTRIL) tablet 20 mg  20 mg Oral Daily    meperidine (DEMEROL) injection 12 5 mg  12 5 mg Intravenous Q10 Min PRN    [MAR Hold] morphine injection 2 mg  2 mg Intravenous Q4H PRN    [MAR Hold] ondansetron (ZOFRAN) injection 4 mg  4 mg Intravenous Q6H PRN    ondansetron (ZOFRAN) injection 4 mg  4 mg Intravenous Once PRN    sodium chloride 0 9 % infusion  75 mL/hr Intravenous Continuous     Allergies   Allergen Reactions    Penicillins        Objective   Vitals: Blood pressure 166/70, pulse 72, temperature (!) (P) 97 1 °F (36 2 °C), resp  rate (P) 20, height 5' 4" (1 626 m), weight 63 8 kg (140 lb 10 5 oz), SpO2 99 %  ,Body mass index is 24 14 kg/m²  Intake/Output Summary (Last 24 hours) at 11/15/2019 1522  Last data filed at 11/15/2019 1458  Gross per 24 hour   Intake 2700 ml   Output 2100 ml   Net 600 ml     I/O last 24 hours:   In: 2700 [I V :2700]  Out: 2100 [Urine:1800; Blood:300]    Invasive Devices     Peripheral Intravenous Line            Peripheral IV 11/14/19 Right Antecubital 1 day          Drain            Urethral Catheter Straight-tip 16 Fr  1 day                Physical Exam   Constitutional: She is oriented to person, place, and time  She appears well-developed and well-nourished  No distress  HENT:   Head: Normocephalic and atraumatic  Eyes: Right eye exhibits no discharge  Left eye exhibits no discharge  Neck: Normal range of motion  Neck supple  No tracheal deviation present  No thyromegaly present  Cardiovascular: Normal rate, regular rhythm and intact distal pulses  Pulmonary/Chest: Effort normal and breath sounds normal  No respiratory distress  Abdominal: Soft  She exhibits no distension  There is no tenderness  Neurological: She is alert and oriented to person, place, and time  She exhibits normal muscle tone  Skin: Skin is warm and dry  Capillary refill takes less than 2 seconds  She is not diaphoretic  No erythema  Psychiatric: She has a normal mood and affect  Her behavior is normal  Thought content normal      Right Hip Exam   Right hip exam is normal      Tenderness   The patient is experiencing no tenderness  Range of Motion   The patient has normal right hip ROM  Muscle Strength   The patient has normal right hip strength  Tests   VILMA: negative    Other   Erythema: absent  Sensation: normal  Pulse: present      Left Hip Exam     Comments:  Patient's left lower extremity is held flexed and externally rotated  Thigh is full but soft  Pain with hip range of motion  Palpable dorsal pedis post   Flexion and extension of the toes and the ankles intact  Brisk cap refill of all toes  Sensation light touch intact in the superficial and deep peroneal, sural, and saphenous nerve distribution  No lacerations or abrasions on the left lower extremity              Lab Results:   I have personally reviewed pertinent lab results  CBC:   Lab Results   Component Value Date    WBC 9 00 11/15/2019    HGB 11 6 (L) 11/15/2019    HCT 34 2 (L) 11/15/2019    MCV 90 11/15/2019     11/15/2019    MCH 30 5 11/15/2019    MCHC 34 0 11/15/2019    RDW 13 9 11/15/2019    MPV 8 5 (L) 11/15/2019     CMP:   Lab Results   Component Value Date    SODIUM 142 11/14/2019     11/14/2019    CO2 24 11/14/2019    BUN 13 11/14/2019    CREATININE 1 01 11/14/2019    CALCIUM 9 6 11/14/2019    EGFR 54 11/14/2019       Imaging Studies: I have personally reviewed pertinent films in PACS and X-rays show left hip subtrochanteric hip fracture with some intertrochanteric extension  The fracture site was flexed and externally rotated  EKG, Pathology, and Other Studies: I have personally reviewed pertinent reports      VTE Prophylaxis: Reason for no pharmacologic prophylaxis Preop

## 2019-11-15 NOTE — PLAN OF CARE
Problem: Potential for Falls  Goal: Patient will remain free of falls  Description  INTERVENTIONS:  - Assess patient frequently for physical needs  -  Identify cognitive and physical deficits and behaviors that affect risk of falls    -  [place] fall precautions as indicated by assessment   - Educate patient/family on patient safety including physical limitations  - Instruct patient to call for assistance with activity based on assessment  - Modify environment to reduce risk of injury  - Consider OT/PT consult to assist with strengthening/mobility  Outcome: Progressing

## 2019-11-15 NOTE — ANESTHESIA PREPROCEDURE EVALUATION
Review of Systems/Medical History  Patient summary reviewed  Chart reviewed      Cardiovascular  EKG reviewed, Hypertension ,    Pulmonary       GI/Hepatic            Endo/Other     GYN       Hematology   Musculoskeletal       Neurology   Psychology         Lab Results   Component Value Date    WBC 9 00 11/15/2019    HGB 11 6 (L) 11/15/2019    HCT 34 2 (L) 11/15/2019    MCV 90 11/15/2019     11/15/2019     Lab Results   Component Value Date    CALCIUM 9 6 11/14/2019    K 4 2 11/14/2019    CO2 24 11/14/2019     11/14/2019    BUN 13 11/14/2019    CREATININE 1 01 11/14/2019     No results found for: INR, PROTIME  No results found for: PTT    Physical Exam    Airway    Mallampati score: III  TM Distance: >3 FB  Neck ROM: full     Dental   upper dentures,     Cardiovascular  Cardiovascular exam normal    Pulmonary  Pulmonary exam normal     Other Findings        Anesthesia Plan  ASA Score- 3 Emergent    Anesthesia Type- spinal with ASA Monitors  Additional Monitors:   Airway Plan:     Comment: I personally discussed risks and benefits to this anesthetic  All patient questions were answered  Pt agrees with anesthesia plan        Plan Factors-    Induction- intravenous  Postoperative Plan- Plan for postoperative opioid use  Informed Consent- Anesthetic plan and risks discussed with patient  I personally reviewed this patient with the CRNA  Discussed and agreed on the Anesthesia Plan with the CRNA  Lamont Mayen

## 2019-11-15 NOTE — OP NOTE
Orthopedics INSERTION NAIL IM FEMUR ANTEGRADE (TROCHANTERIC)  Op Note      Pre-op Diagnosis:   Closed fracture of left hip, subtrochanteric    Post-op Diagnosis:  Same with basicervical extension    Procedure:  Left hip open reduction with long cephalomedullary nail placement    Surgeon:  Surgeon(s):  Cathie Geiger MD   I was present for the entire procedure    Anesthesia:  GETA    Estimated Blood Loss:  300 mL    Material sent to lab:  None      Complications:  None    Findings:  Primarily subtroch fracture pattern, once the subtroch fracture was reduced basicervical was noted  Following fixation the fracture was stable within adequate alignment and fixation  Indications:  A 70 y o  y/o female with an left intertrochanteric hip fracture  Treatment options were discussed, and the recommendation was made for an ORIF of the hip  Risks and benefits of surgery were discussed which included but were not limited to continued infection, malunion, nonunion, neurovascular damage, need to return to the OR, failure of the implants, symptomatic hardware, need for blood transfusion, wound healing problems, stiffness, failure of heart or lungs  Informed consent was obtained  Procedure: The patient was met preoperatively and the left hip was identified and signed  The patient was taken back to the operating room where a General endotracheal was performed  Patient was positioned on the fracture table with the left leg in traction and the contralateral leg in the well leg lucia  Flouro was brought in to confirm good alignment and visualization  Once the was acceptable alignment of the fracture the leg was sterilely prepped and draped in the usual fashion  A timeout was held identifying the patient, side, site, antibiotics, and allergies  The patient received Ancef  preoperatively  Prior to making incision I was unable to get the fracture reduced on the fracture table with the proximal fragment flexed    Just distal to the fracture all longitudinal incision was made  I dissected down through the ITB band to the fracture site  I was able to easily get a Walters over the anterior aspect of the proximal fracture, in using this as leverage get the fracture nearly anatomically reduced  Once I was pleased that we had adequate alignment I proceeded  A 3 cm incision was make proximal to the greater trochanter  I disscected down to the greater troch, and using imaging placed a guide pin down the tip of the greater troch  Once pleased with the placement of the pin, the entry reamer was used  I then placed a long guide pin  I sequentially reamed starting at an 8 5 mm Reamer to a 12 5  The nail, Synthes TFN size [id number], 125 degree, was placed down the canal   It was advanced until I was please with the alignment of the lag screw with the femoral head  At this point the Walters was removed, but the cervical neck displaced  The Lerry Brace was replaced and held in position until the lag blade was inserted  The initial incision was then used and the guide for the lag screw was placed down to bone  The guide wire was placed, and once pleased with the location and length it was measured, reamed, and a 95 mm blade was placed  Once of excision was completed proximally the guide was removed  The traction was removed and the leg was abducted  Fluoroscopy was brought in for perfect circles of the distal femur  Using a knife, drill and fluoroscopy as guidance I drilled through the distal interlocking screw and placed a distal interlocking screw without difficulty  Final images confirmed adequate alignment of the fracture with good postioning and length of the nail and screws  The incisions were thoroughly irrigated out  Deep fascia was closed with 0-vicryl  Skin closed with 2-0 vicryl and staples  A sterile dressing was placed  Disposition:  Patient tolerated the procedure well and was taken to recovery postoperatively      Plan:  - Patient will be WBAT  - Dressing change POD#2  - Will follow and monitor H/H- transfusion will be deferred to Internal Medicine, primary service  - PT/OT  - Lovenox for DVT prophylaxis with foot pumps  - Xrays at 6 weeks post op  - follow-up with Dr Bailey Beavers two weeks postop     @[facility]@     Date: 11/15/2019  Time: 3:01 PM

## 2019-11-15 NOTE — DISCHARGE INSTRUCTIONS
For the hip fracture:  - weight-bearing as tolerated to the left lower extremity, no range-of-motion restrictions  - Lovenox for two weeks, 40 mg subcu daily then may transition to 325 mg of aspirin b i d   For one month  - daily dressing changes as needed  - Keep incision clean and dry until staples removed by Orthopedics

## 2019-11-15 NOTE — ASSESSMENT & PLAN NOTE
Patient is on benazepril at home though she is unsure of dose, will start on 10 mg p o  Daily and adjust based on blood pressure until home dose can be obtained

## 2019-11-15 NOTE — ANESTHESIA POSTPROCEDURE EVALUATION
Post-Op Assessment Note    CV Status:  Stable  Pain Score: 4    Pain management: adequate     Mental Status:  Alert and awake   Hydration Status:  Euvolemic   PONV Controlled:  Controlled   Airway Patency:  Patent   Post Op Vitals Reviewed: Yes      Staff: Anesthesiologist           BP   120/73   Temp   97   Pulse  110   Resp   22   SpO2   99

## 2019-11-15 NOTE — H&P
H&P- Remington Cano 1940, 70 y o  female MRN: [id number]    Unit/Bed#: -01 Encounter: [id number]    Primary Care Provider: Monqiue Ventura DO   Date and time admitted to hospital: 11/14/2019  1:19 PM        * Closed left hip fracture [facility])  Assessment & Plan  · Admit to Medicine  · Give pain control p r n   · DVT prophylaxis will be SCDs only at this time till cleared by Orthopedic surgery  · Consult Orthopedics in a m   · NPO after midnight    Fall from ground level  Assessment & Plan  This is simple mechanical falls patient lost her balance while climbing to to something off the top shelf at [employer], will consult PT OT in a m  Benign essential HTN  Assessment & Plan  Patient is on benazepril at home though she is unsure of dose, will start on 10 mg p o  Daily and adjust based on blood pressure until home dose can be obtained  VTE Prophylaxis: SCDs only at this time till cleared by Orthopedic surgery  Code Status:  Level 1  POLST: There is no POLST form on file for this patient (pre-hospital)  Discussion with family:  None present at bedside at time of exam    Anticipated Length of Stay:  Patient will be admitted on an Inpatient basis with an anticipated length of stay of  > 2 midnights  Justification for Hospital Stay:  Fall resulting in left hip fracture requiring orthopedic evaluation    Total Time for Visit, including Counseling / Coordination of Care: 1 hour  Greater than 50% of this total time spent on direct patient counseling and coordination of care  Chief Complaint:   Full x1 today with left hip pain    History of Present Illness:    Remington Cano is a 70 y o  female who presents with will x1 today with left hip pain status post fall  Patient was shopping at [employer] when she attempted to get something off the top shelf that she was not quite tall enough to reach so she stood on the bottom shelf attempting to reach it    Patient states when stepping off the shelf her heel caught and lip and she fell landing on her left hip onto a concrete floor  Patient reported 10/10 left hip pain which is currently a 7/10 she states that she was unable to stand after the fall secondary to pain  Patient denies any other trauma though she does note that she has an ecchymosis on her left forearm, she denies any chest pain, dizziness, loss of consciousness  Review of Systems:  Review of Systems   Constitutional: Negative for chills and fever  Respiratory: Negative for cough and shortness of breath  Cardiovascular: Negative for chest pain and palpitations  Gastrointestinal: Negative for diarrhea, nausea and vomiting  Genitourinary: Negative for dysuria, frequency, hematuria and urgency  Musculoskeletal: Positive for arthralgias and gait problem  Neurological: Negative for dizziness, syncope, weakness, light-headedness and headaches  All other systems reviewed and are negative  Past Medical and Surgical History:   Past Medical History:   Diagnosis Date    Hypertension        History reviewed  No pertinent surgical history  Meds/Allergies:  Prior to Admission medications    Medication Sig Start Date End Date Taking? Authorizing Provider   BENAZEPRIL HCL PO Take by mouth   Yes Historical Provider, MD   TEMAZEPAM PO Take by mouth   Yes Historical Provider, MD     I have reviewed home medications with patient personally  Allergies: Allergies   Allergen Reactions    Penicillins        Social History:  Marital Status:     Occupation:  Retired  Patient Pre-hospital Living Situation:  Resides at home with son and daughter-in-law  Patient Pre-hospital Level of Mobility:  Full without assist  Patient Pre-hospital Diet Restrictions:  None  Substance Use History:     Social History     Substance and Sexual Activity   Alcohol Use Not Currently     Social History     Tobacco Use   Smoking Status Never Smoker   Smokeless Tobacco Never Used     Social History     Substance and Sexual Activity   Drug Use Never       Family History:  I have reviewed the patients family history    Physical Exam:   Vitals:   Blood Pressure: 167/70 (11/14/19 1855)  Pulse: 64 (11/14/19 1855)  Temperature: 98 8 °F (37 1 °C) (11/14/19 1855)  Temp Source: Temporal (11/14/19 1855)  Respirations: 18 (11/14/19 1855)  Height: 5' 4" (162 6 cm)(Stated ) (11/14/19 1855)  Weight - Scale: 63 8 kg (140 lb 10 5 oz) (11/14/19 1855)  SpO2: 100 % (11/14/19 1855)    Physical Exam   Constitutional: She is oriented to person, place, and time  She appears well-developed and well-nourished  HENT:   Head: Normocephalic and atraumatic  Mouth/Throat: No oropharyngeal exudate  Eyes: Pupils are equal, round, and reactive to light  EOM are normal  No scleral icterus  Neck: Normal range of motion  Neck supple  No JVD present  Cardiovascular: Normal rate, regular rhythm and normal heart sounds  No murmur heard  Pulmonary/Chest: Effort normal and breath sounds normal  No respiratory distress  She has no wheezes  She has no rales  Abdominal: Soft  Bowel sounds are normal  There is no tenderness  There is no rebound and no guarding  Musculoskeletal: She exhibits no edema  Left hip: She exhibits decreased range of motion and tenderness  Lymphadenopathy:     She has no cervical adenopathy  Neurological: She is alert and oriented to person, place, and time  Skin: Skin is warm and dry  Ecchymosis noted  No rash noted  No erythema  Psychiatric: She has a normal mood and affect  Her behavior is normal    Nursing note and vitals reviewed  Additional Data:   Lab Results: I have personally reviewed pertinent reports  Results from last 7 days   Lab Units 11/14/19  1647   POTASSIUM mmol/L 4 2   CHLORIDE mmol/L 105   CO2 mmol/L 24   BUN mg/dL 13   CREATININE mg/dL 1 01   CALCIUM mg/dL 9 6                   Imaging: I have personally reviewed pertinent reports      CT hip left without contrast   Final Result by Ivone Nicolas MD (11/14 1610)   Comminuted impacted fracture of the intertrochanteric left femoral neck with apex anterior and lateral angulation  Workstation performed: [id number]         XR hip/pelv 2-3 vws left if performed   Final Result by Ivone Nicolas MD (11/14 1552)      Comminuted impacted left intertrochanteric femoral neck fracture  No evidence of dislocation  The study was marked in [facility] for immediate notification  Workstation performed: [id number]         XR foot 2 views LEFT   Final Result by Ivone Nicolas MD (11/14 1538)   Limited evaluation secondary to limited positioning and lack of an oblique view  No evident fracture  If symptoms persist dedicated 3 view foot series should be considered given exam limitations  Workstation performed: [id number]         XR knee 1 or 2 views left   Final Result by Ivone Nicolas MD (11/14 9272)      Limited evaluation secondary to positioning  No acute osseous abnormality  Degenerative changes as described  Workstation performed: [id number]             EKG, Pathology, and Other Studies Reviewed on Admission:   · EKG: N/A    NetAccess/Epic Records Reviewed: Yes     ** Please Note: This note has been constructed using a voice recognition system   **

## 2019-11-15 NOTE — ASSESSMENT & PLAN NOTE
· Orthopedic surgery input appreciated  · The patient underwent a left ORIF of the right hip on 11/15/2019  · Will continue postoperative care per Orthopedic surgery  · Continue local wound care  · PT and OT  · Pain management

## 2019-11-15 NOTE — PROGRESS NOTES
Progress Note - Jose Keating 1940, 70 y o  female MRN: [id number]    Unit/Bed#: ICU 06 Encounter: [id number]    Primary Care Provider: Maximino Romero DO   Date and time admitted to hospital: 11/14/2019  1:19 PM        * Closed left hip fracture [facility])  Assessment & Plan  · Orthopedic surgery input appreciated  · The patient underwent a left ORIF of the right hip on 11/15/2019  · Will continue postoperative care per Orthopedic surgery  · Continue local wound care  · PT and OT  · Pain management        Benign essential HTN  Assessment & Plan  · The patient noted to have SBP 160s and lisinopril was given; she was noted to have SBP 120s prior to surgery   · She was noted to be hypotensive during the surgery and postoperatively  Hemoglobin noted to drop from 10-11 to 8s  · Currently is being transfused- she will be receiving 2 units of packed red blood cells total  · The patient received phenylephrine post-op    · Will hold off on BP medication for now   · If needed overnight will start phenylephrine or levophed  · Continue with IVF after transfusion  Fall from ground level  Assessment & Plan  · This is simple mechanical falls patient lost her balance while climbing to to something off the top shelf at [employer]  · No loss of conscious   · Fall precautions   · PT/OT       VTE Pharmacologic Prophylaxis: Pharmacologic: Enoxaparin (Lovenox) start in AM     Patient Centered Rounds: I have performed bedside rounds with nursing staff today      Discussions with Specialists or Other Care Team Provider: ortho, nursing, PT/OT, CM, pharmacy   Education and Discussions with Family / Patient: patient and daughter     Current Length of Stay: 1 day(s)    Current Patient Status: Inpatient   Certification Statement: The patient will continue to require additional inpatient hospital stay due to left hip fracture     Discharge Plan: pending hospital course     Code Status: Level 1 - Full Code    Subjective:   Having some pain on left hip  Denies any n/v  No dyspnea  Objective:     Vitals:   Temp (24hrs), Av 6 °F (37 °C), Min:97 1 °F (36 2 °C), Max:100 3 °F (37 9 °C)    Temp:  [97 1 °F (36 2 °C)-100 3 °F (37 9 °C)] 98 9 °F (37 2 °C)  HR:  [] 80  Resp:  [12-21] 16  BP: ()/(31-74) 103/54  SpO2:  [97 %-100 %] 99 %  Body mass index is 24 14 kg/m²  Input and Output Summary (last 24 hours): Intake/Output Summary (Last 24 hours) at 11/15/2019 1741  Last data filed at 11/15/2019 1601  Gross per 24 hour   Intake 2700 ml   Output 2550 ml   Net 150 ml       Physical Exam:     Physical Exam   Constitutional: She is oriented to person, place, and time  She appears well-developed and well-nourished  No distress  HENT:   Head: Normocephalic and atraumatic  Mouth/Throat: Oropharynx is clear and moist    Eyes: Pupils are equal, round, and reactive to light  Conjunctivae and EOM are normal    Neck: Normal range of motion  Neck supple  No thyromegaly present  Cardiovascular: Normal rate, regular rhythm and normal heart sounds  Exam reveals no gallop and no friction rub  No murmur heard  Pulmonary/Chest: Effort normal and breath sounds normal  She has no wheezes  She has no rales  Abdominal: Soft  Bowel sounds are normal  She exhibits no distension  There is no tenderness  There is no rebound  Musculoskeletal: She exhibits no edema, tenderness or deformity  Neurological: She is alert and oriented to person, place, and time  No cranial nerve deficit  Skin: Skin is warm and dry  No rash noted  No erythema  Left hip dressing on    Psychiatric: She has a normal mood and affect  Her behavior is normal  Thought content normal    Vitals reviewed        Additional Data:     Labs:    Results from last 7 days   Lab Units 11/15/19  1527   WBC Thousand/uL 16 50*   HEMOGLOBIN g/dL 8 0*   HEMATOCRIT % 24 3*   PLATELETS Thousands/uL 185     Results from last 7 days   Lab Units 19  1647   POTASSIUM mmol/L 4 2 CHLORIDE mmol/L 105   CO2 mmol/L 24   BUN mg/dL 13   CREATININE mg/dL 1 01   CALCIUM mg/dL 9 6                   * I Have Reviewed All Lab Data Listed Above  * Additional Pertinent Lab Tests Reviewed: Clarissa 66 Admission  Reviewed    Imaging:  Imaging Reports Reviewed Today Include: hip xray    Recent Cultures (last 7 days):           Last 24 Hours Medication List:     Current Facility-Administered Medications:  acetaminophen 650 mg Oral Q6H PRN Mily Dior MD   acetaminophen 975 mg Oral [facility] Mily Dior MD   cefazolin 2,000 mg Intravenous Once Mily Dior MD   [START ON 11/16/2019] cefazolin 2,000 mg Intravenous Q8H Mily Dior MD   [START ON 11/16/2019] enoxaparin 40 mg Subcutaneous Q24H [address] Mily Dior MD   ePHEDrine 10 mg Intravenous Q5 Min PRN WAGNER Willoughby   gabapentin 100 mg Oral TID Mily Dior MD   HYDROcodone-acetaminophen 1 tablet Oral Q6H PRN Mily Dior MD   lactated ringers 100 mL/hr Intravenous Continuous WAGNER Willoughby   morphine injection 2 mg Intravenous Q4H PRN Mily Diro MD   norepinephrine 1-30 mcg/min Intravenous Titrated WAGNER Willoughby   ondansetron 4 mg Intravenous Q6H PRN Mily Dior MD        Today, Patient Was Seen By: WAGNER Willoughby    ** Please Note: Dictation voice to text software may have been used in the creation of this document   **

## 2019-11-15 NOTE — PHYSICAL THERAPY NOTE
Physical Therapy Cancellation Note        PT session canceled due to pending orthopedic consult  Pt will be seen once medically appropriate

## 2019-11-15 NOTE — ANESTHESIA PREPROCEDURE EVALUATION
Review of Systems/Medical History  Patient summary reviewed  Chart reviewed      Cardiovascular  Hypertension ,    Pulmonary       GI/Hepatic            Endo/Other     GYN       Hematology   Musculoskeletal       Neurology   Psychology         Lab Results   Component Value Date    WBC 9 00 11/15/2019    HGB 11 6 (L) 11/15/2019    HCT 34 2 (L) 11/15/2019    MCV 90 11/15/2019     11/15/2019     Lab Results   Component Value Date    CALCIUM 9 6 11/14/2019    K 4 2 11/14/2019    CO2 24 11/14/2019     11/14/2019    BUN 13 11/14/2019    CREATININE 1 01 11/14/2019     No results found for: INR, PROTIME  No results found for: PTT    Physical Exam    Airway    Mallampati score: III  TM Distance: >3 FB  Neck ROM: full     Dental   upper dentures and lower dentures,     Cardiovascular  Cardiovascular exam normal    Pulmonary  Pulmonary exam normal     Other Findings        Anesthesia Plan  ASA Score- 3 Emergent    Anesthesia Type- general with ASA Monitors  Additional Monitors:   Airway Plan: ETT  Comment: Surgeon requested muscle relaxation for difficult reduction  I personally discussed risks and benefits to this anesthetic  All patient questions were answered  Pt agrees with anesthesia plan        Plan Factors-    Induction- intravenous  Postoperative Plan- Plan for postoperative opioid use  Planned trial extubation    Informed Consent- Anesthetic plan and risks discussed with patient  I personally reviewed this patient with the CRNA  Discussed and agreed on the Anesthesia Plan with the CRNA  Rod Hamilton

## 2019-11-15 NOTE — ASSESSMENT & PLAN NOTE
· The patient noted to have SBP 160s and lisinopril was given; she was noted to have SBP 120s prior to surgery   · She was noted to be hypotensive during the surgery and postoperatively  Hemoglobin noted to drop from 10-11 to 8s  · Currently is being transfused- she will be receiving 2 units of packed red blood cells total  · The patient received phenylephrine post-op    · Will hold off on BP medication for now   · If needed overnight will start phenylephrine or levophed  · Continue with IVF after transfusion

## 2019-11-16 PROBLEM — I95.81 POSTOPERATIVE HYPOTENSION: Status: ACTIVE | Noted: 2019-11-16

## 2019-11-16 LAB
ABO GROUP BLD BPU: NORMAL
ABO GROUP BLD BPU: NORMAL
ANION GAP SERPL CALCULATED.3IONS-SCNC: 8 MMOL/L (ref 4–13)
BPU ID: NORMAL
BPU ID: NORMAL
BUN SERPL-MCNC: 15 MG/DL (ref 7–25)
CALCIUM SERPL-MCNC: 7.3 MG/DL (ref 8.6–10.5)
CHLORIDE SERPL-SCNC: 109 MMOL/L (ref 98–107)
CO2 SERPL-SCNC: 19 MMOL/L (ref 21–31)
CREAT SERPL-MCNC: 1.07 MG/DL (ref 0.6–1.2)
CROSSMATCH: NORMAL
CROSSMATCH: NORMAL
ERYTHROCYTE [DISTWIDTH] IN BLOOD BY AUTOMATED COUNT: 19.1 % (ref 11.5–14.5)
GFR SERPL CREATININE-BSD FRML MDRD: 50 ML/MIN/1.73SQ M
GLUCOSE SERPL-MCNC: 197 MG/DL (ref 65–99)
HCT VFR BLD AUTO: 26.3 % (ref 42–47)
HGB BLD-MCNC: 8.9 G/DL (ref 12–16)
MCH RBC QN AUTO: 28.1 PG (ref 26–34)
MCHC RBC AUTO-ENTMCNC: 33.9 G/DL (ref 31–37)
MCV RBC AUTO: 83 FL (ref 81–99)
PLATELET # BLD AUTO: 127 THOUSANDS/UL (ref 149–390)
PMV BLD AUTO: 8.8 FL (ref 8.6–11.7)
POTASSIUM SERPL-SCNC: 4.1 MMOL/L (ref 3.5–5.5)
RBC # BLD AUTO: 3.17 MILLION/UL (ref 3.9–5.2)
SODIUM SERPL-SCNC: 136 MMOL/L (ref 134–143)
UNIT DISPENSE STATUS: NORMAL
UNIT DISPENSE STATUS: NORMAL
UNIT PRODUCT CODE: NORMAL
UNIT PRODUCT CODE: NORMAL
UNIT RH: NORMAL
UNIT RH: NORMAL
WBC # BLD AUTO: 12.9 THOUSAND/UL (ref 4.8–10.8)

## 2019-11-16 PROCEDURE — 99232 SBSQ HOSP IP/OBS MODERATE 35: CPT | Performed by: HOSPITALIST

## 2019-11-16 PROCEDURE — 97116 GAIT TRAINING THERAPY: CPT

## 2019-11-16 PROCEDURE — 80048 BASIC METABOLIC PNL TOTAL CA: CPT | Performed by: NURSE PRACTITIONER

## 2019-11-16 PROCEDURE — G8978 MOBILITY CURRENT STATUS: HCPCS

## 2019-11-16 PROCEDURE — G8988 SELF CARE GOAL STATUS: HCPCS

## 2019-11-16 PROCEDURE — 85027 COMPLETE CBC AUTOMATED: CPT | Performed by: NURSE PRACTITIONER

## 2019-11-16 PROCEDURE — G8979 MOBILITY GOAL STATUS: HCPCS

## 2019-11-16 PROCEDURE — 97163 PT EVAL HIGH COMPLEX 45 MIN: CPT

## 2019-11-16 PROCEDURE — 97166 OT EVAL MOD COMPLEX 45 MIN: CPT

## 2019-11-16 PROCEDURE — G8987 SELF CARE CURRENT STATUS: HCPCS

## 2019-11-16 RX ADMIN — GABAPENTIN 100 MG: 100 CAPSULE ORAL at 08:50

## 2019-11-16 RX ADMIN — GABAPENTIN 100 MG: 100 CAPSULE ORAL at 21:36

## 2019-11-16 RX ADMIN — ENOXAPARIN SODIUM 40 MG: 40 INJECTION SUBCUTANEOUS at 08:50

## 2019-11-16 RX ADMIN — GABAPENTIN 100 MG: 100 CAPSULE ORAL at 16:26

## 2019-11-16 RX ADMIN — SODIUM CHLORIDE, POTASSIUM CHLORIDE, SODIUM LACTATE AND CALCIUM CHLORIDE 100 ML/HR: 600; 310; 30; 20 INJECTION, SOLUTION INTRAVENOUS at 01:26

## 2019-11-16 RX ADMIN — CEFAZOLIN SODIUM 2000 MG: 2 SOLUTION INTRAVENOUS at 01:26

## 2019-11-16 RX ADMIN — ACETAMINOPHEN 975 MG: 325 TABLET ORAL at 05:51

## 2019-11-16 RX ADMIN — CEFAZOLIN SODIUM 2000 MG: 2 SOLUTION INTRAVENOUS at 08:50

## 2019-11-16 RX ADMIN — HYDROCODONE BITARTRATE AND ACETAMINOPHEN 1 TABLET: 5; 325 TABLET ORAL at 17:47

## 2019-11-16 RX ADMIN — SODIUM CHLORIDE 500 ML: 9 INJECTION, SOLUTION INTRAVENOUS at 03:17

## 2019-11-16 NOTE — ASSESSMENT & PLAN NOTE
· This was the initial cause of the hip fracture This was a simple mechanical fall - patient lost her balance while climbing to get something off the top shelf at [facility]

## 2019-11-16 NOTE — ASSESSMENT & PLAN NOTE
· Resolved  · Status post Levophed therapy  · Levophed has been discontinued  · Okay for downgrade out of the ICU

## 2019-11-16 NOTE — OCCUPATIONAL THERAPY NOTE
Occupational Therapy Evaluation      Elena Bahena    11/16/2019    Principal Problem:    Closed left hip fracture [facility])  Active Problems:    Fall from ground level    Benign essential HTN    Acute blood loss anemia      Past Medical History:   Diagnosis Date    Hypertension        History reviewed  No pertinent surgical history  11/16/19 0753   Note Type   Note type Eval only   Restrictions/Precautions   Weight Bearing Precautions Per Order Yes   LLE Weight Bearing Per Order WBAT   Other Precautions WBS;THR;Fall Risk;Pain;Telemetry;Multiple lines  (/65 at rest, 149/64 after activity, HR 87)   Pain Assessment   Pain Assessment 0-10   Pain Score 3   Pain Type Surgical pain   Pain Location Hip   Pain Orientation Left   Home Living   Type of Home Mobile home   Home Layout One level;Ramped entrance;Performs ADLs on one level; Able to live on main level with bedroom/bathroom   Bathroom Shower/Tub Walk-in shower   Bathroom Toilet Raised   Bathroom Equipment Grab bars in shower; Shower chair;Grab bars around toilet   [address] [facility]  (Did not use PTA)   Prior Function   Level of [place] Independent with ADLs and functional mobility   Lives With [employer] Help From Family   ADL Assistance Independent   IADLs Independent   Falls in the last 6 months 1 to 4   Vocational Retired   Comments   ((+) driving)   ADL   UB Bathing Assistance 5  Supervision/Setup   LB Bathing Assistance 3  Moderate Assistance   UB Dressing Assistance 5  Supervision/Setup   LB Dressing Assistance 1  Total Assistance   Bed Mobility   Supine to Sit 4  Minimal assistance   Additional items Assist x 2;LE management;Verbal cues; Increased time required   Sit to Supine 4  Minimal assistance   Additional items Assist x 2;LE management;Verbal cues; Increased time required   Transfers   Sit to Stand 4  Minimal assistance   Additional items Assist x 2;Verbal cues; Increased time required   Stand to Sit 4  Minimal assistance   Additional items Assist x 2; Increased time required;Verbal cues   Functional Mobility   Functional Mobility 3  Moderate assistance  (A x2)   Additional Comments 5ft   Additional items Rolling walker   Balance   Static Sitting Fair +   Dynamic Sitting Fair +   Static Standing Fair   Dynamic Standing Fair -   Ambulatory Poor +   Activity Tolerance   Activity Tolerance Patient limited by fatigue;Patient limited by pain   Nurse Made Aware yes, Opal Baig Assessment   RUE Assessment WFL   LUE Assessment   LUE Assessment WFL   Hand Function   Gross Motor Coordination Functional   Fine Motor Coordination Functional   Cognition   Overall Cognitive Status WFL   Arousal/Participation Alert; Cooperative   Attention Attends with cues to redirect   Orientation Level Oriented X4   Memory Within functional limits   Following Commands Follows all commands and directions without difficulty   Comments Mini-Cog assessment performed today  Version 1 was given  Patient able to recall 2/3 words  Patient able to draw a normal clock with the correct time  Total score of test was 4/5 indicating no further screening of cognition is required  Assessment   Limitation Decreased ADL status; Decreased Safe judgement during ADL;Decreased endurance;Decreased self-care trans;Decreased high-level ADLs   Prognosis Good   Assessment Pt is a 70 y o  female seen for OT evaluation s/p admit to [address] [place] [address] on 11/14/2019 w/ Closed left hip fracture ([facility] [address] )  Comorbidities affecting pt's functional performance at time of assessment include: HTN  Personal factors affecting pt at time of IE include:difficulty performing ADLS and difficulty performing IADLS   Prior to admission, pt was I with ADLs and IADLs  Upon evaluation: the following deficits impact occupational performance: decreased balance, decreased tolerance, increased pain and orthopedic restrictions   Pt to benefit from continued skilled OT tx while in the hospital to address deficits as defined above and maximize level of functional independence w ADL's and functional mobility  Occupational Performance areas to address include: bathing/shower, toilet hygiene, dressing, functional mobility and clothing management  From OT standpoint, recommendation at time of d/c would be STR  Goals   Patient Goals Get back home   Plan   Treatment Interventions ADL retraining;Functional transfer training; Endurance training;Neuromuscular reeducation; Energy conservation;Equipment evaluation/education   Goal Expiration Date 11/26/19   OT Treatment Day 0   OT Frequency 3-5x/wk   Recommendation   OT Discharge Recommendation Short Term Rehab   OT - OK to Discharge Yes  (when medically stable)   Barthel Index   Feeding 10   Bathing 0   Grooming Score 5   Dressing Score 5   Bladder Score 10   Bowels Score 10   Toilet Use Score 5   Transfers (Bed/Chair) Score 5   Mobility (Level Surface) Score 0   Stairs Score 0   Barthel Index Score 50     GOALS:    Pt will achieve the following within specified time frame: STG  Pt will achieve the following goals within 5 days    *ADL transfers with Min (A) for inc'd independence with ADLs/purposeful tasks    *UB ADL with (I) for inc'd independence with self cares    *LB ADL with Mod (A) using AE prn for inc'd independence with self cares    *Toileting with Mod (A) for clothing management and hygiene for return to PLOF with personal care    *Increase static stand balance to F+ and dyn stand balance to F for inc'd safety with standing purposeful tasks    *Increase stand tolerance x5 m for inc'd tolerance with standing purposeful tasks    *Participate in 10m UE therex to increase overall stamina/activity tolerance for purposeful tasks    *Bed mobility- Min (A) for inc'd independence to manage own comfort and initiate EOB & OOB purposeful tasks    Pt will achieve the following within specified time frame: LTG  Pt will achieve the following goals within 10 days    *ADL transfers with CGA for inc'd independence with ADLs/purposeful tasks    *LB ADL with Min (A) using AE prn for inc'd independence with self cares    *Toileting with Min (A) for clothing management and hygiene for return to PLOF with personal care    *Increase static stand balance to G- and dyn stand balance to F+ for inc'd safety with standing purposeful tasks    *Increase stand tolerance x7 m for inc'd tolerance with standing purposeful tasks    *Bed mobility- CGA for inc'd independence to manage own comfort and initiate EOB & OOB purposeful tasks      Key Feliciano, MS, OTR/L

## 2019-11-16 NOTE — PHYSICAL THERAPY NOTE
Physical Therapy Evaluation     Patient's Name: Mary Jiang    Admitting Diagnosis  Hip fracture ([facility] [address] ) Tavia Zarate  Hip injury [[id number]]    Problem List  Patient Active Problem List   Diagnosis    Closed left hip fracture ([facility] [address] )    Fall from ground level    Benign essential HTN    Closed fracture of left hip ([facility] [address] )    Acute blood loss anemia       Past Medical History  Past Medical History:   Diagnosis Date    Hypertension        Past Surgical History  History reviewed  No pertinent surgical history  11/16/19 4597   Note Type   Note type Eval/Treat   Pain Assessment   Pain Assessment 0-10   Pain Score 3   Pain Type Surgical pain   Pain Location Hip   Pain Orientation Left   Pain Descriptors Aching   Pain Frequency Constant/continuous   Pain Onset Ongoing   Clinical Progression Not changed   Patient's Stated Pain Goal No pain   Hospital Pain Intervention(s) Medication (See MAR); Repositioned; Ambulation/increased activity   Multiple Pain Sites No   Home Living   Type of Home Mobile home   Home Layout One level;Ramped entrance;Performs ADLs on one level; Able to live on main level with bedroom/bathroom   Bathroom Shower/Tub Walk-in shower   Bathroom Toilet Raised   Bathroom Equipment Grab bars in shower; Shower chair;Grab bars around toilet   [address] [facility]  (Did not use PTA)   Additional Comments plans to live with son upon discharge, son has 1 BABY    Prior Function   Level of [place] Independent with ADLs and functional mobility   Lives With [employer] Help From Family   ADL Assistance Independent   IADLs Independent   Falls in the last 6 months 1 to 4   Vocational Retired   Comments   ((+) driving)   Restrictions/Precautions   [employer] [place] Bearing Precautions Per Order Yes   LLE Weight Bearing Per Order WBAT  (s/p ORIF 11/15/2019 Dr Rodolfo Borrego)   Other Precautions WBS; Fall Risk;Pain;Telemetry;Multiple lines  (/65 at rest, 149/64 after activity, HR 87) General   Family/Caregiver Present No   Cognition   Overall Cognitive Status WFL   Arousal/Participation Alert   Attention Attends with cues to redirect   Orientation Level Oriented X4   Memory Within functional limits   Following Commands Follows all commands and directions without difficulty   RUE Assessment   RUE Assessment WFL   LUE Assessment   LUE Assessment WFL   RLE Assessment   RLE Assessment WFL   LLE Assessment   LLE Assessment X  (grossly 3+/5)   Coordination   Movements are Fluid and Coordinated 0   Coordination and Movement Description Incremental, antalgic mobility requiring tactile cues of 2 throughout session  Light Touch   RLE Light Touch Grossly intact   LLE Light Touch Grossly intact   Sharp/Dull   RLE Sharp/Dull Grossly intact   LLE Sharp/Dull Grossly intact   Bed Mobility   Supine to Sit 4  Minimal assistance   Additional items Assist x 2;LE management;Verbal cues; Increased time required   Sit to Supine 4  Minimal assistance   Additional items Assist x 2;LE management;Verbal cues; Increased time required   Transfers   Sit to Stand 4  Minimal assistance   Additional items Assist x 2;Verbal cues; Increased time required   Stand to Sit 4  Minimal assistance   Additional items Assist x 2; Increased time required;Verbal cues; Bedrails   Stand pivot Unable to assess   Ambulation/Elevation   Gait pattern Decreased foot clearance; Wide RUTHY;Narrow RUTHY; Short stride; Excessively slow   Gait Assistance 3  Moderate assist   Additional items Assist x 2  (pt became increasingly dizzy while standing )   Assistive Device Rolling walker   Distance 5 feet    Balance   Static Sitting Fair +   Dynamic Sitting Fair +   Static Standing Fair   Dynamic Standing Fair -   Ambulatory Poor +   Endurance Deficit   Endurance Deficit Yes   Activity Tolerance   Activity Tolerance Patient limited by pain   Nurse Made Aware yes Festus Meyer RN   Assessment   Prognosis Good   Problem List Decreased strength;Decreased range of motion;Decreased endurance; Impaired balance;Decreased mobility; Decreased coordination;Pain;Orthopedic restrictions   Assessment Pt is 70 y o  female seen for PT evaluation on 11/16/2019 s/p admit to [address] on 11/14/2019 w/ Closed left hip fracture ([address] )  PT consulted to assess pt's functional mobility and d/c needs  Order placed for PT eval and tx, w/ WBAT L LE order  Performed at least 2 patient identifiers during session: Name and wristband  Comorbidities affecting pt's physical performance at time of assessment include: HTN, anemia   PTA, pt was independent w/ all functional mobility w/ o A  Personal factors affecting pt at time of IE include: ambulating w/ assistive device, inability to ambulate household distances and inability to navigate community distances  Please find objective findings from PT assessment regarding body systems outlined above with impairments and limitations including weakness, decreased ROM, impaired balance, decreased endurance, gait deviations, pain, decreased activity tolerance, decreased functional mobility tolerance, fall risk and orthopedic restrictions, as well as mobility assessment (need for minimal x2 assist w/ all phases of mobility when usually ambulating independently)  The following objective measures performed on IE also reveal limitations: Barthel Index: 50/100  Pt's clinical presentation is currently unstable/unpredictable seen in pt's presentation of ongoing medical assessment  Pt to benefit from continued PT tx to address deficits as defined above and maximize level of functional independent mobility and consistency  From PT/mobility standpoint, recommendation at time of d/c would be STR pending progress in order to facilitate return to PLOF     Goals   Patient Goals Get back home   LTG Expiration Date 11/26/19   Long Term Goal #1 In 7-10 days: Increase bilateral LE strength 1/2 grade to facilitate independent mobility, Perform all bed mobility tasks modified independent to decrease caregiver burden, Perform all transfers modified independent to improve independence, Ambulate > 150  ft  with RW modified independent w/o LOB and w/ normalized gait pattern 100% of the time, Navigate 1 stairs modified independent with unilateral handrail to facilitate return to previous living environment, Increase all balance 1/2 grade to decrease risk for falls and Complete % of the time   PT Treatment Day 1   Plan   Treatment/Interventions Functional transfer training;LE strengthening/ROM; Elevations; Therapeutic exercise; Endurance training;Bed mobility;Gait training;OT   PT Frequency 7x/wk   Recommendation   Recommendation Short-term skilled PT   Equipment Recommended Walker   PT - OK to Discharge Yes  (if medically stable)   Barthel Index   Feeding 10   Bathing 0   Grooming Score 5   Dressing Score 5   Bladder Score 10   Bowels Score 10   Toilet Use Score 5   Transfers (Bed/Chair) Score 5   Mobility (Level Surface) Score 0   Stairs Score 0   Barthel Index Score 50   S: Patient reported dizziness upon standing with stiffness and pain in the hip  O: Minimal assist x2 during sit to stand, and supine to sit with LE management, gait training w/ moderate x 2 x 10 minutes with focus on correct walker use, proper weight shift and ambulation 5 feet to begin weight acceptance  A: Patient tolerated session well with slight increase in pain and dizziness upon standing, minimal increase in pain during ambulation  Recommending STR until pt is safe to ambulate and transfer safely  P: Continue to increase ambulation distance and independence level during transfers and functional mobility         Jessica Vickers, SPT

## 2019-11-16 NOTE — PLAN OF CARE
Problem: OCCUPATIONAL THERAPY ADULT  Goal: Performs self-care activities at highest level of function for planned discharge setting  See evaluation for individualized goals  Description  Treatment Interventions: ADL retraining, Functional transfer training, Endurance training, Neuromuscular reeducation, Energy conservation, Equipment evaluation/education    See flowsheet documentation for full assessment, interventions and recommendations  Note:   Limitation: Decreased ADL status, Decreased Safe judgement during ADL, Decreased endurance, Decreased self-care trans, Decreased high-level ADLs  Prognosis: Good  Assessment: Pt is a 70 y o  female seen for OT evaluation s/p admit to [address] [place] [address] on 11/14/2019 w/ Closed left hip fracture ([address] )  Comorbidities affecting pt's functional performance at time of assessment include: HTN  Personal factors affecting pt at time of IE include:difficulty performing ADLS and difficulty performing IADLS   Prior to admission, pt was I with ADLs and IADLs  Upon evaluation: the following deficits impact occupational performance: decreased balance, decreased tolerance, increased pain and orthopedic restrictions  Pt to benefit from continued skilled OT tx while in the hospital to address deficits as defined above and maximize level of functional independence w ADL's and functional mobility  Occupational Performance areas to address include: bathing/shower, toilet hygiene, dressing, functional mobility and clothing management  From OT standpoint, recommendation at time of d/c would be STR       OT Discharge Recommendation: Short Term Rehab  OT - OK to Discharge: Yes(when medically stable)    Key Vega MS, OTR/L

## 2019-11-16 NOTE — ASSESSMENT & PLAN NOTE
· Patient is status post an ORIF of the right hip on 11/15/2019  · All management, including pain and DVT prophylaxis is per the primary service

## 2019-11-16 NOTE — PLAN OF CARE
Problem: PHYSICAL THERAPY ADULT  Goal: Performs mobility at highest level of function for planned discharge setting  See evaluation for individualized goals  Description  Treatment/Interventions: Functional transfer training, LE strengthening/ROM, Elevations, Therapeutic exercise, Endurance training, Bed mobility, Gait training, OT  Equipment Recommended: Walker       See flowsheet documentation for full assessment, interventions and recommendations  Note:   Prognosis: Good  Problem List: Decreased strength, Decreased range of motion, Decreased endurance, Impaired balance, Decreased mobility, Decreased coordination, Pain, Orthopedic restrictions  Assessment: Pt is 70 y o  female seen for PT evaluation on 11/16/2019 s/p admit to [address] [place] [address] on 11/14/2019 w/ Closed left hip fracture ([address] )  PT consulted to assess pt's functional mobility and d/c needs  Order placed for PT eval and tx, w/ WBAT L LE order  Performed at least 2 patient identifiers during session: Name and wristband  Comorbidities affecting pt's physical performance at time of assessment include: HTN, anemia   PTA, pt was independent w/ all functional mobility w/ o A  Personal factors affecting pt at time of IE include: ambulating w/ assistive device, inability to ambulate household distances and inability to navigate community distances  Please find objective findings from PT assessment regarding body systems outlined above with impairments and limitations including weakness, decreased ROM, impaired balance, decreased endurance, gait deviations, pain, decreased activity tolerance, decreased functional mobility tolerance, fall risk and orthopedic restrictions, as well as mobility assessment (need for minimal x2 assist w/ all phases of mobility when usually ambulating independently)  The following objective measures performed on IE also reveal limitations: Barthel Index: 50/100   Pt's clinical presentation is currently unstable/unpredictable seen in pt's presentation of ongoing medical assessment  Pt to benefit from continued PT tx to address deficits as defined above and maximize level of functional independent mobility and consistency  From PT/mobility standpoint, recommendation at time of d/c would be STR pending progress in order to facilitate return to PLOF  Recommendation: Short-term skilled PT     PT - OK to Discharge: Yes(if medically stable)    See flowsheet documentation for full assessment

## 2019-11-16 NOTE — PROGRESS NOTES
Progress Note - Leila Dixon 1940, 70 y o  female MRN: [id number]    Unit/Bed#: ICU 06 Encounter: [id number]    Primary Care Provider: Melynda Kehr, DO   Date and time admitted to hospital: 11/14/2019  1:19 PM        * Closed left hip fracture [facility])  Assessment & Plan  · Patient is status post an ORIF of the right hip on 11/15/2019  · All management, including pain and DVT prophylaxis is per the primary service    Acute blood loss anemia  Assessment & Plan  · Secondary to an acute blood loss anemia with a component of a dilutional effect from the IV fluids that the patient was receiving  · Status post 2 units of PRBCs  · Hemoglobin is 8 9  · Patient is now hemodynamically stable  · Continue to monitor H&H    Postoperative hypotension  Assessment & Plan  · Resolved  · Status post Levophed therapy  · Levophed has been discontinued  · Okay for downgrade out of the ICU    Benign essential HTN  Assessment & Plan  · Resume antihypertensive is as blood pressure allows    Fall from ground level  Assessment & Plan  · This was the initial cause of the hip fracture This was a simple mechanical fall - patient lost her balance while climbing to get something off the top shelf at [facility]    · Patient is medically stable for downgrade out of the ICU to med surge    VTE Pharmacologic Prophylaxis: Pharmacologic: Enoxaparin (Lovenox)    Patient Centered Rounds: I have performed bedside rounds with nursing staff today      Discussions with Specialists or Other Care Team Provider:  Case management, nursing and pharmacy  Education and Discussions with Family / Patient:  Patient was brought up to par with the plan of care for today, all questions answered to her satisfaction    Current Length of Stay: 2 day(s)    Current Patient Status: Inpatient   Certification Statement: The patient will continue to require additional inpatient hospital stay due to Continued monitoring of blood pressure, as well as case management working on disposition for potential rehab    Discharge Plan:  Discharge planning will commence once the patient has been deemed medically and surgically stable    Code Status: Level 1 - Full Code    Subjective:   Patient seen and examined, no new complaints no distress  She states that she actually had the ability to get out of bed for the 1st time this morning with physical therapy, she does continue to complain of pain in the left lower extremity    Objective:     Vitals:   Temp (24hrs), Av 4 °F (36 9 °C), Min:96 1 °F (35 6 °C), Max:100 4 °F (38 °C)    Temp:  [96 1 °F (35 6 °C)-100 4 °F (38 °C)] 99 1 °F (37 3 °C)  HR:  [] 90  Resp:  [12-57] 26  BP: ()/(31-96) 129/58  SpO2:  [82 %-100 %] 97 %  Body mass index is 24 14 kg/m²  Input and Output Summary (last 24 hours): Intake/Output Summary (Last 24 hours) at 2019 1106  Last data filed at 2019 0850  Gross per 24 hour   Intake 4032 07 ml   Output 1413 ml   Net 2619 07 ml       Physical Exam:     Physical Exam   Constitutional: She is oriented to person, place, and time  She appears well-developed and well-nourished  HENT:   Head: Normocephalic and atraumatic  Nose: Nose normal    Mouth/Throat: Oropharynx is clear and moist    Eyes: Pupils are equal, round, and reactive to light  Conjunctivae and EOM are normal    Neck: Normal range of motion  Neck supple  No JVD present  No thyromegaly present  Cardiovascular: Normal rate, regular rhythm and intact distal pulses  Exam reveals no gallop and no friction rub  No murmur heard  Pulmonary/Chest: Effort normal and breath sounds normal  No respiratory distress  Abdominal: Soft  Bowel sounds are normal  She exhibits no distension and no mass  There is no tenderness  There is no guarding  Musculoskeletal: Normal range of motion  She exhibits no edema  Appropriate postop day 1 left hip dressing is in place, dry and intact   Lymphadenopathy:     She has no cervical adenopathy  Neurological: She is alert and oriented to person, place, and time  No cranial nerve deficit  Skin: Skin is warm  No rash noted  No erythema  Psychiatric: She has a normal mood and affect  Her behavior is normal    Vitals reviewed  Additional Data:     Labs:    Results from last 7 days   Lab Units 11/16/19  0524   WBC Thousand/uL 12 90*   HEMOGLOBIN g/dL 8 9*   HEMATOCRIT % 26 3*   PLATELETS Thousands/uL 127*     Results from last 7 days   Lab Units 11/16/19  0524   POTASSIUM mmol/L 4 1   CHLORIDE mmol/L 109*   CO2 mmol/L 19*   BUN mg/dL 15   CREATININE mg/dL 1 07   CALCIUM mg/dL 7 3*                   * I Have Reviewed All Lab Data Listed Above  * Additional Pertinent Lab Tests Reviewed: [address] Admission  Reviewed    Imaging:  Imaging Reports Reviewed Today Include:  None    Recent Cultures (last 7 days):           Last 24 Hours Medication List:     Current Facility-Administered Medications:  acetaminophen 650 mg Oral Q6H PRN Roxana Milan MD    acetaminophen 975 mg Oral [facility] Roxana Milan MD    cefazolin 2,000 mg Intravenous Q8H Roxana Milan MD Last Rate: 2,000 mg (11/16/19 0850)   enoxaparin 40 mg Subcutaneous Q24H Celeste Steiner MD    gabapentin 100 mg Oral TID Roxana Milan MD    HYDROcodone-acetaminophen 1 tablet Oral Q6H PRN Roxana Milan MD    morphine injection 2 mg Intravenous Q4H PRN Roxana Milan MD    ondansetron 4 mg Intravenous Q6H PRN Roxana Milan MD         Today, Patient Was Seen By: Bethel Hernandez MD    ** Please Note: Dictation voice to text software may have been used in the creation of this document   **

## 2019-11-17 PROBLEM — R50.9 LOW GRADE FEVER: Status: ACTIVE | Noted: 2019-11-17

## 2019-11-17 LAB
ANION GAP SERPL CALCULATED.3IONS-SCNC: 3 MMOL/L (ref 4–13)
BASOPHILS # BLD AUTO: 0.1 THOUSANDS/ΜL (ref 0–0.1)
BASOPHILS NFR BLD AUTO: 1 % (ref 0–2)
BUN SERPL-MCNC: 16 MG/DL (ref 7–25)
CALCIUM SERPL-MCNC: 7.8 MG/DL (ref 8.6–10.5)
CHLORIDE SERPL-SCNC: 109 MMOL/L (ref 98–107)
CO2 SERPL-SCNC: 25 MMOL/L (ref 21–31)
CREAT SERPL-MCNC: 0.9 MG/DL (ref 0.6–1.2)
EOSINOPHIL # BLD AUTO: 0.1 THOUSAND/ΜL (ref 0–0.61)
EOSINOPHIL NFR BLD AUTO: 1 % (ref 0–5)
ERYTHROCYTE [DISTWIDTH] IN BLOOD BY AUTOMATED COUNT: 18.9 % (ref 11.5–14.5)
GFR SERPL CREATININE-BSD FRML MDRD: 62 ML/MIN/1.73SQ M
GLUCOSE SERPL-MCNC: 114 MG/DL (ref 65–99)
HCT VFR BLD AUTO: 21.9 % (ref 42–47)
HGB BLD-MCNC: 7.6 G/DL (ref 12–16)
LYMPHOCYTES # BLD AUTO: 1.6 THOUSANDS/ΜL (ref 0.6–4.47)
LYMPHOCYTES NFR BLD AUTO: 16 % (ref 21–51)
MCH RBC QN AUTO: 29.5 PG (ref 26–34)
MCHC RBC AUTO-ENTMCNC: 34.8 G/DL (ref 31–37)
MCV RBC AUTO: 85 FL (ref 81–99)
MONOCYTES # BLD AUTO: 1.2 THOUSAND/ΜL (ref 0.17–1.22)
MONOCYTES NFR BLD AUTO: 12 % (ref 2–12)
NEUTROPHILS # BLD AUTO: 6.9 THOUSANDS/ΜL (ref 1.4–6.5)
NEUTS SEG NFR BLD AUTO: 70 % (ref 42–75)
PLATELET # BLD AUTO: 172 THOUSANDS/UL (ref 149–390)
PMV BLD AUTO: 9.4 FL (ref 8.6–11.7)
POTASSIUM SERPL-SCNC: 4.3 MMOL/L (ref 3.5–5.5)
RBC # BLD AUTO: 2.59 MILLION/UL (ref 3.9–5.2)
SODIUM SERPL-SCNC: 137 MMOL/L (ref 134–143)
WBC # BLD AUTO: 9.9 THOUSAND/UL (ref 4.8–10.8)

## 2019-11-17 PROCEDURE — 97530 THERAPEUTIC ACTIVITIES: CPT

## 2019-11-17 PROCEDURE — 97116 GAIT TRAINING THERAPY: CPT

## 2019-11-17 PROCEDURE — 85025 COMPLETE CBC W/AUTO DIFF WBC: CPT | Performed by: HOSPITALIST

## 2019-11-17 PROCEDURE — 99232 SBSQ HOSP IP/OBS MODERATE 35: CPT | Performed by: HOSPITALIST

## 2019-11-17 PROCEDURE — 80048 BASIC METABOLIC PNL TOTAL CA: CPT | Performed by: HOSPITALIST

## 2019-11-17 PROCEDURE — P9016 RBC LEUKOCYTES REDUCED: HCPCS

## 2019-11-17 PROCEDURE — 99024 POSTOP FOLLOW-UP VISIT: CPT | Performed by: ORTHOPAEDIC SURGERY

## 2019-11-17 RX ORDER — AMOXICILLIN 250 MG
2 CAPSULE ORAL
Status: DISCONTINUED | OUTPATIENT
Start: 2019-11-17 | End: 2019-11-20 | Stop reason: HOSPADM

## 2019-11-17 RX ORDER — TEMAZEPAM 15 MG/1
15 CAPSULE ORAL
Status: DISCONTINUED | OUTPATIENT
Start: 2019-11-17 | End: 2019-11-20 | Stop reason: HOSPADM

## 2019-11-17 RX ADMIN — SENNOSIDES AND DOCUSATE SODIUM 2 TABLET: 8.6; 5 TABLET ORAL at 01:19

## 2019-11-17 RX ADMIN — SENNOSIDES AND DOCUSATE SODIUM 2 TABLET: 8.6; 5 TABLET ORAL at 21:25

## 2019-11-17 RX ADMIN — ACETAMINOPHEN 975 MG: 325 TABLET ORAL at 21:25

## 2019-11-17 RX ADMIN — ENOXAPARIN SODIUM 40 MG: 40 INJECTION SUBCUTANEOUS at 08:16

## 2019-11-17 RX ADMIN — GABAPENTIN 100 MG: 100 CAPSULE ORAL at 16:04

## 2019-11-17 RX ADMIN — HYDROCODONE BITARTRATE AND ACETAMINOPHEN 1 TABLET: 5; 325 TABLET ORAL at 00:44

## 2019-11-17 RX ADMIN — GABAPENTIN 100 MG: 100 CAPSULE ORAL at 21:25

## 2019-11-17 RX ADMIN — HYDROCODONE BITARTRATE AND ACETAMINOPHEN 1 TABLET: 5; 325 TABLET ORAL at 08:16

## 2019-11-17 RX ADMIN — ACETAMINOPHEN 975 MG: 325 TABLET ORAL at 14:58

## 2019-11-17 RX ADMIN — GABAPENTIN 100 MG: 100 CAPSULE ORAL at 08:16

## 2019-11-17 RX ADMIN — TEMAZEPAM 15 MG: 15 CAPSULE ORAL at 23:44

## 2019-11-17 RX ADMIN — HYDROCODONE BITARTRATE AND ACETAMINOPHEN 1 TABLET: 5; 325 TABLET ORAL at 16:03

## 2019-11-17 NOTE — ASSESSMENT & PLAN NOTE
· Patient is status post an ORIF of the right hip on 11/15/2019  · Postop day 2  · All management, including pain and DVT prophylaxis is per Orthopedics

## 2019-11-17 NOTE — PLAN OF CARE
Problem: Potential for Falls  Goal: Patient will remain free of falls  Description  INTERVENTIONS:  - Assess patient frequently for physical needs  -  Identify cognitive and physical deficits and behaviors that affect risk of falls    -  [place] fall precautions as indicated by assessment   - Educate patient/family on patient safety including physical limitations  - Instruct patient to call for assistance with activity based on assessment  - Modify environment to reduce risk of injury  - Consider OT/PT consult to assist with strengthening/mobility  Outcome: Progressing     Problem: Prexisting or High Potential for Compromised Skin Integrity  Goal: Skin integrity is maintained or improved  Description  INTERVENTIONS:  - Identify patients at risk for skin breakdown  - Assess and monitor skin integrity  - Assess and monitor nutrition and hydration status  - Monitor labs   - Assess for incontinence   - Turn and reposition patient  - Assist with mobility/ambulation  - Relieve pressure over bony prominences  - Avoid friction and shearing  - Provide appropriate hygiene as needed including keeping skin clean and dry  - Evaluate need for skin moisturizer/barrier cream  - Collaborate with interdisciplinary team   - Patient/family teaching  - Consider wound care consult   Outcome: Progressing     Problem: RESPIRATORY - ADULT  Goal: Achieves optimal ventilation and oxygenation  Description  INTERVENTIONS:  - Assess for changes in respiratory status  - Assess for changes in mentation and behavior  - Position to facilitate oxygenation and minimize respiratory effort  - Oxygen administered by appropriate delivery if ordered  - Initiate smoking cessation education as indicated  - Encourage broncho-pulmonary hygiene including cough, deep breathe, Incentive Spirometry  - Assess the need for suctioning and aspirate as needed  - Assess and instruct to report SOB or any respiratory difficulty  - Respiratory Therapy support as indicated  Outcome: Progressing     Problem: GENITOURINARY - ADULT  Goal: Maintains or returns to baseline urinary function  Description  INTERVENTIONS:  - Assess urinary function  - Encourage oral fluids to ensure adequate hydration if ordered  - Administer IV fluids as ordered to ensure adequate hydration  - Administer ordered medications as needed  - Offer frequent toileting  - Follow urinary retention protocol if ordered  Outcome: Progressing  Goal: Absence of urinary retention  Description  INTERVENTIONS:  - Assess patients ability to void and empty bladder  - Monitor I/O  - Bladder scan as needed  - Discuss with physician/AP medications to alleviate retention as needed  - Discuss catheterization for long term situations as appropriate  Outcome: Progressing     Problem: METABOLIC, FLUID AND ELECTROLYTES - ADULT  Goal: Electrolytes maintained within normal limits  Description  INTERVENTIONS:  - Monitor labs and assess patient for signs and symptoms of electrolyte imbalances  - Administer electrolyte replacement as ordered  - Monitor response to electrolyte replacements, including repeat lab results as appropriate  - Instruct patient on fluid and nutrition as appropriate  Outcome: Progressing  Goal: Fluid balance maintained  Description  INTERVENTIONS:  - Monitor labs   - Monitor I/O and WT  - Instruct patient on fluid and nutrition as appropriate  - Assess for signs & symptoms of volume excess or deficit  Outcome: Progressing  Goal: Glucose maintained within target range  Description  INTERVENTIONS:  - Monitor Blood Glucose as ordered  - Assess for signs and symptoms of hyperglycemia and hypoglycemia  - Administer ordered medications to maintain glucose within target range  - Assess nutritional intake and initiate nutrition service referral as needed  Outcome: Progressing     Problem: SKIN/TISSUE INTEGRITY - ADULT  Goal: Skin integrity remains intact  Description  INTERVENTIONS  - Identify patients at risk for skin breakdown  - Assess and monitor skin integrity  - Assess and monitor nutrition and hydration status  - Monitor labs (i e  albumin)  - Assess for incontinence   - Turn and reposition patient  - Assist with mobility/ambulation  - Relieve pressure over bony prominences  - Avoid friction and shearing  - Provide appropriate hygiene as needed including keeping skin clean and dry  - Evaluate need for skin moisturizer/barrier cream  - Collaborate with interdisciplinary team (i e  Nutrition, Rehabilitation, etc )   - Patient/family teaching  Outcome: Progressing  Goal: Incision(s), wounds(s) or drain site(s) healing without S/S of infection  Description  INTERVENTIONS  - Assess and document risk factors for skin impairment   - Assess and document dressing, incision, wound bed, drain sites and surrounding tissue  - Consider nutrition services referral as needed  - Oral mucous membranes remain intact  - Provide patient/ family education  Outcome: Progressing  Goal: Oral mucous membranes remain intact  Description  INTERVENTIONS  - Assess oral mucosa and hygiene practices  - Implement preventative oral hygiene regimen  - Implement oral medicated treatments as ordered  - Initiate Nutrition services referral as needed  Outcome: Progressing     Problem: HEMATOLOGIC - ADULT  Goal: Maintains hematologic stability  Description  INTERVENTIONS  - Assess for signs and symptoms of bleeding or hemorrhage  - Monitor labs  - Administer supportive blood products/factors as ordered and appropriate  Outcome: Progressing     Problem: MUSCULOSKELETAL - ADULT  Goal: Maintain or return mobility to safest level of function  Description  INTERVENTIONS:  - Assess patient's ability to carry out ADLs; assess patient's baseline for ADL function and identify physical deficits which impact ability to perform ADLs (bathing, care of mouth/teeth, toileting, grooming, dressing, etc )  - Assess/evaluate cause of self-care deficits   - Assess range of motion  - Assess patient's mobility  - Assess patient's need for assistive devices and provide as appropriate  - Encourage maximum independence but intervene and supervise when necessary  - Involve family in performance of ADLs  - Assess for home care needs following discharge   - Consider OT consult to assist with ADL evaluation and planning for discharge  - Provide patient education as appropriate  Outcome: Progressing  Goal: Maintain proper alignment of affected body part  Description  INTERVENTIONS:  - Support, maintain and protect limb and body alignment  - Provide patient/ family with appropriate education  Outcome: Progressing     Problem: PAIN - ADULT  Goal: Verbalizes/displays adequate comfort level or baseline comfort level  Description  Interventions:  - Encourage patient to monitor pain and request assistance  - Assess pain using appropriate pain scale  - Administer analgesics based on type and severity of pain and evaluate response  - Implement non-pharmacological measures as appropriate and evaluate response  - Consider cultural and social influences on pain and pain management  - Notify physician/advanced practitioner if interventions unsuccessful or patient reports new pain  Outcome: Progressing     Problem: INFECTION - ADULT  Goal: Absence or prevention of progression during hospitalization  Description  INTERVENTIONS:  - Assess and monitor for signs and symptoms of infection  - Monitor lab/diagnostic results  - Monitor all insertion sites, i e  indwelling lines, tubes, and drains  - Monitor endotracheal if appropriate and nasal secretions for changes in amount and color  - [place] appropriate cooling/warming therapies per order  - Administer medications as ordered  - Instruct and encourage patient and family to use good hand hygiene technique  - Identify and instruct in appropriate isolation precautions for identified infection/condition  Outcome: Progressing  Goal: Absence of fever/infection during neutropenic period  Description  INTERVENTIONS:  - Monitor WBC    Outcome: Progressing     Problem: SAFETY ADULT  Goal: Maintain or return to baseline ADL function  Description  INTERVENTIONS:  -  Assess patient's ability to carry out ADLs; assess patient's baseline for ADL function and identify physical deficits which impact ability to perform ADLs (bathing, care of mouth/teeth, toileting, grooming, dressing, etc )  - Assess/evaluate cause of self-care deficits   - Assess range of motion  - Assess patient's mobility; develop plan if impaired  - Assess patient's need for assistive devices and provide as appropriate  - Encourage maximum independence but intervene and supervise when necessary  - Involve family in performance of ADLs  - Assess for home care needs following discharge   - Consider OT consult to assist with ADL evaluation and planning for discharge  - Provide patient education as appropriate  Outcome: Progressing  Goal: Maintain or return mobility status to optimal level  Description  INTERVENTIONS:  - Assess patient's baseline mobility status (ambulation, transfers, stairs, etc )    - Identify cognitive and physical deficits and behaviors that affect mobility  - Identify mobility aids required to assist with transfers and/or ambulation (gait belt, sit-to-stand, lift, walker, cane, etc )  - [place] fall precautions as indicated by assessment  - Record patient progress and toleration of activity level on Mobility SBAR; progress patient to next Phase/Stage  - Instruct patient to call for assistance with activity based on assessment  - Consider rehabilitation consult to assist with strengthening/weightbearing, etc   Outcome: Progressing     Problem: DISCHARGE PLANNING  Goal: Discharge to home or other facility with appropriate resources  Description  INTERVENTIONS:  - Identify barriers to discharge w/patient and caregiver  - Arrange for needed discharge resources and transportation as appropriate  - Identify discharge learning needs (meds, wound care, etc )  - Arrange for interpretive services to assist at discharge as needed  - Refer to Case Management Department for coordinating discharge planning if the patient needs post-hospital services based on physician/advanced practitioner order or complex needs related to functional status, cognitive ability, or social support system  Outcome: Progressing     Problem: Knowledge Deficit  Goal: Patient/family/caregiver demonstrates understanding of disease process, treatment plan, medications, and discharge instructions  Description  Complete learning assessment and assess knowledge base    Interventions:  - Provide teaching at level of understanding  - Provide teaching via preferred learning methods  Outcome: Progressing

## 2019-11-17 NOTE — PLAN OF CARE
Problem: OCCUPATIONAL THERAPY ADULT  Goal: Performs self-care activities at highest level of function for planned discharge setting  See evaluation for individualized goals  Description  Treatment Interventions: ADL retraining, Functional transfer training, Endurance training, Neuromuscular reeducation, Energy conservation, Equipment evaluation/education    See flowsheet documentation for full assessment, interventions and recommendations  Outcome: Not Progressing  Note:   Limitation: Decreased ADL status, Decreased Safe judgement during ADL, Decreased endurance, Decreased self-care trans, Decreased high-level ADLs  Prognosis: Good  Assessment: Patient participated in Skilled OT session this date with interventions consisting of  therapeutic activities to: increase activity tolerance, increase dynamic sit/ stand balance during functional activity  and increase OOB/ sitting tolerance   Patient agreeable to OT treatment session, upon arrival patient was found supine in bed and in no apparent distress  In comparison to previous session, patient required more assistance for functional transfers and bed mobility  Patient requiring verbal cues for correct technique, one step directives, frequent rest periods and ocassional safety reminders  Patient continues to be functioning below baseline level, occupational performance remains limited secondary to factors listed above and increased risk for falls and injury  From OT standpoint, recommendation at time of d/c would be Short Term Rehab  Patient to benefit from continued Occupational Therapy treatment while in the hospital to address deficits as defined above and maximize level of functional independence with ADLs and functional mobility        OT Discharge Recommendation: Short Term Rehab  OT - OK to Discharge: Yes(Once medically cleared)     Taty Gates OT

## 2019-11-17 NOTE — PROGRESS NOTES
Progress Note - Orthopedics   Kolby Arnold 70 y o  female MRN: [id number]  Unit/Bed#: -02 Encounter: [id number]      Subjective: pod#2 s/p ORIF of L hip  Patient c/o pain in hip with WB and pain in feet from SCDs  Had trasufsion of PRBC for ABLA  Vitals: Blood pressure 132/63, pulse 90, temperature 100 °F (37 8 °C), temperature source Temporal, resp  rate 18, height 5' 4" (1 626 m), weight 63 8 kg (140 lb 10 5 oz), SpO2 95 %  ,Body mass index is 24 14 kg/m²  Intake/Output Summary (Last 24 hours) at 11/17/2019 1458  Last data filed at 11/17/2019 0241  Gross per 24 hour   Intake 260 ml   Output 400 ml   Net -140 ml         Physical Exam:  left LE:  Dressing c/d/i- changed  Incision c/d/i  Up/down toes  SITLT m/l/d/p foot  BCRx5  Calf soft/nontender      Lab, Imaging and other studies:   I have personally reviewed pertinent lab results  CBC:   Lab Results   Component Value Date    WBC 9 90 11/17/2019    HGB 7 6 (L) 11/17/2019    HCT 21 9 (L) 11/17/2019    MCV 85 11/17/2019     11/17/2019    MCH 29 5 11/17/2019    MCHC 34 8 11/17/2019    RDW 18 9 (H) 11/17/2019    MPV 9 4 11/17/2019     CMP:   Lab Results   Component Value Date    SODIUM 137 11/17/2019     (H) 11/17/2019    CO2 25 11/17/2019    BUN 16 11/17/2019    CREATININE 0 90 11/17/2019    CALCIUM 7 8 (L) 11/17/2019    EGFR 62 11/17/2019           Assessment:   A 70 y o  y/o female  2 Days Post-Op  s/p left ORIF      Plan:    - PT/OT  - dispo planning  - dressing change as needed by nursing  - pain control  - lovenox for DVT prophylaxis  - Weight bearing: WBAT RUE and LLE  - okay for d/c from ortho    F/u 2 weeks      Agustin Adams MD

## 2019-11-17 NOTE — PHYSICAL THERAPY NOTE
Physical Therapy Treatment Session Note    Patient's Name: Nakita Mazariegos    Admitting Diagnosis  Hip fracture ([facility] [address] ) Jass Prost  Hip injury [Y24 280T]    Problem List  Patient Active Problem List   Diagnosis    Closed left hip fracture ([facility] [address] )    Fall from ground level    Benign essential HTN    [facility]    Acute blood loss anemia    Postoperative hypotension       Past Medical History  Past Medical History:   Diagnosis Date    Hypertension        Past Surgical History  History reviewed  No pertinent surgical history  11/17/19 0824   Pain Assessment   Pain Assessment 0-10   Pain Score 5   Pain Type Acute pain;Surgical pain   Pain Location Hip   Pain Orientation Left   Pain Descriptors Aching; Sharp   Pain Frequency Constant/continuous   Pain Onset Ongoing   Clinical Progression Not changed   Patient's Stated Pain Goal No pain   Hospital Pain Intervention(s) Medication (See MAR); Repositioned; Ambulation/increased activity   Multiple Pain Sites No   Restrictions/Precautions   Weight Bearing Precautions Per Order Yes   LLE Weight Bearing Per Order WBAT   Other Precautions Fall Risk;Pain;WBS   General   Chart Reviewed Yes   Response to Previous Treatment Patient with no complaints from previous session  Family/Caregiver Present No   Cognition   Overall Cognitive Status WFL   Arousal/Participation Alert; Cooperative   Attention Attends with cues to redirect   Orientation Level Oriented X4   Memory Within functional limits   Following Commands Follows one step commands with increased time or repetition   Subjective   Subjective "I don't know if I can do it today"   Bed Mobility   Rolling R 4  Minimal assistance   Additional items Assist x 1;Bedrails; Increased time required;Verbal cues;LE management  (x 3 trials)   Rolling L 4  Minimal assistance   Additional items Assist x 1;Bedrails; Increased time required;Verbal cues;LE management  (x 3 trials)   Supine to Sit 3  Moderate assistance Additional items Assist x 2;HOB elevated; Bedrails; Increased time required;Verbal cues;LE management   Sit to Supine 3  Moderate assistance   Additional items Assist x 2;Bedrails; Increased time required;Verbal cues;LE management   Additional Comments Pt  maintained static sitting at EOB x 10 minutes w/supervision of 1 w/o LOB,BUE support  Transfers   Sit to Stand 3  Moderate assistance   Additional items Assist x 2;Bedrails; Increased time required;Verbal cues   Stand to Sit 3  Moderate assistance   Additional items Assist x 2;Bedrails; Increased time required;Verbal cues   Stand pivot Unable to assess  (2/2 pt  declination w/associated pain L hip)   Ambulation/Elevation   Gait pattern Improper Weight shift; Antalgic; Forward Flexion;Narrow RUTHY; Decreased foot clearance;Decreased L stance; Inconsistent chacho; Short stride   Gait Assistance 3  Moderate assist   Additional items Assist x 2;Verbal cues; Tactile cues  (consistently,assistance to advance LLE)   Assistive Device Rolling walker   Distance 1 foot fwd/bwd  (could not progress 2/2 pain)   Stair Management Assistance Not tested   Balance   Static Sitting Fair +   Dynamic Sitting Fair +   Static Standing Fair   Dynamic Standing Fair -   Ambulatory Poor +   Endurance Deficit   Endurance Deficit Yes   Activity Tolerance   Activity Tolerance Patient limited by fatigue;Patient limited by pain   Nurse Made Aware yes,Sanam RN provided pain medication   Assessment   Prognosis Good   Problem List Decreased strength;Decreased range of motion;Decreased endurance; Impaired balance;Decreased mobility; Decreased coordination;Pain;Orthopedic restrictions   Assessment Pt seen for PT treatment session this date with interventions consisting of gait training w/ emphasis on improving pt's ability to ambulate level surfaces x 2 feet total with mod A of 2 provided by therapist with RW and therapeutic activity consisting of training: bed mobility, supine<>sit transfers, sit<>stand transfers, static sitting tolerance at EOB for 10 minutes w/ B UE support, static standing tolerance for 2 minutes w/ B UE support, vc and tactile cues for static sitting posture faciliation and vc and tactile cues for static standing posture faciliation  Pt agreeable to PT treatment session upon arrival, pt found supine in bed w/ HOB elevated, A&O x 4 and L hip pain 5/10  In comparison to previous session, pt with no improvements as evidenced by required assist level, ambulatory distance  Post session: pt returned BTB, all needs in reach and RN notified of session findings/recommendations Continue to recommend STR at time of d/c in order to maximize pt's functional independence and safety w/ mobility  Pt continues to be functioning below baseline level, and remains limited 2* factors listed above and including weakness, gait dysfunction, impaired balance, decreased endurance, decreased activity tolerance, pain, decline from PLOF  PT will continue to see pt while here in order to address the deficits listed above and provide interventions consistent w/ POC in effort to achieve LTGs  Goals   Patient Goals have less pain   LTG Expiration Date 11/26/19   Long Term Goal #1 LTGs remain appropriate   PT Treatment Day 2   Plan   Treatment/Interventions Functional transfer training;LE strengthening/ROM; Therapeutic exercise; Endurance training;Patient/family training;Equipment eval/education; Bed mobility;Gait training;Spoke to nursing;OT;Spoke to advanced practitioner   Progress No functional improvements   PT Frequency 7x/wk   Recommendation   Recommendation Short-term skilled PT   Equipment Recommended Walker   PT - OK to Discharge Yes  (if medically stable to STR)   Additional Comments Upon conclusion, pt  was resting in bed w/SCD's active & all needs within reach         Fernando Johnson, PT

## 2019-11-17 NOTE — ASSESSMENT & PLAN NOTE
· Secondary to an acute blood loss anemia with a component of a dilutional effect from the IV fluids that the patient was receiving  · Initial hemoglobin was 12 0 - when the hemoglobin dropped to 8 she received 2 units of blood that brought her hemoglobin back up to 10 4, hemoglobin is back down to 7 6, will transfuse 2 units of PRBCs today  · No active bleeding  · Will have orthopedics to re-evaluate

## 2019-11-17 NOTE — OCCUPATIONAL THERAPY NOTE
OccupationalTherapy Treatment Note     Patient Name: Shayy MERAZ Date: 11/17/2019  Problem List  Principal Problem:    Closed left hip fracture [facility])  Active Problems:    Fall from ground level    Benign essential HTN    Acute blood loss anemia    Postoperative hypotension          11/17/19 0813   Restrictions/Precautions   Weight Bearing Precautions Per Order Yes   LLE Weight Bearing Per Order WBAT   Other Precautions WBS;Pain; Fall Risk   Pain Assessment   Pain Assessment 0-10   Pain Score 5   Pain Type Surgical pain   Pain Location Hip   Pain Orientation Left   Pain Descriptors Aching   Pain Frequency Constant/continuous   Pain Onset Ongoing   Clinical Progression Not changed   Hospital Pain Intervention(s) Repositioned; Ambulation/increased activity; Medication (See MAR)   Functional Standing Tolerance   Time ~2 minutes    Activity While trying to bear weight through LLE while using the RW   Bed Mobility   Rolling R 4  Minimal assistance   Additional items Assist x 1;Bedrails; Increased time required;Verbal cues;LE management   Rolling L 4  Minimal assistance   Additional items Assist x 1;Bedrails; Increased time required;Verbal cues;LE management   Supine to Sit 3  Moderate assistance   Additional items Assist x 2;Bedrails;HOB elevated; Increased time required;Verbal cues;LE management   Sit to Supine 3  Moderate assistance   Additional items Assist x 2; Increased time required;Verbal cues;LE management   Transfers   Sit to Stand 3  Moderate assistance   Additional items Assist x 2;Verbal cues; Increased time required   Stand to Sit 3  Moderate assistance   Additional items Assist x 2; Increased time required;Verbal cues   Stand pivot Unable to assess  (2/2 pt declination due to significant pain )   Cognition   Overall Cognitive Status [facility]   Arousal/Participation Alert; Responsive   Attention Attends with cues to redirect   Orientation Level Oriented X4   Memory Within functional limits   Following Commands Follows one step commands without difficulty   Comments Pt agreeable to OT session upon arrival    Activity Tolerance   Activity Tolerance Patient limited by pain; Patient limited by fatigue   Medical Staff Made Aware RN [place] made aware of outcomes and pain   Assessment   Assessment Patient participated in Skilled OT session this date with interventions consisting of  therapeutic activities to: increase activity tolerance, increase dynamic sit/ stand balance during functional activity  and increase OOB/ sitting tolerance   Patient agreeable to OT treatment session, upon arrival patient was found supine in bed and in no apparent distress  In comparison to previous session, patient required more assistance for functional transfers and bed mobility  Patient requiring verbal cues for correct technique, one step directives, frequent rest periods and ocassional safety reminders  Patient continues to be functioning below baseline level, occupational performance remains limited secondary to factors listed above and increased risk for falls and injury  From OT standpoint, recommendation at time of d/c would be Short Term Rehab  Patient to benefit from continued Occupational Therapy treatment while in the hospital to address deficits as defined above and maximize level of functional independence with ADLs and functional mobility  Plan   Treatment Interventions ADL retraining;Functional transfer training; Endurance training; Compensatory technique education; Energy conservation   Goal Expiration Date 11/26/19   OT Treatment Day 1   OT Frequency 3-5x/wk   Recommendation   OT Discharge Recommendation Short Term Rehab   OT - OK to Discharge Yes  (Once medically cleared)     Kobi Garza OT

## 2019-11-17 NOTE — ASSESSMENT & PLAN NOTE
· Secondary to suspected atelectasis  · A fevers continuing to postop day 3 which would be tomorrow, would recommend checking a UA

## 2019-11-17 NOTE — PLAN OF CARE
Problem: PHYSICAL THERAPY ADULT  Goal: Performs mobility at highest level of function for planned discharge setting  See evaluation for individualized goals  Description  Treatment/Interventions: Functional transfer training, LE strengthening/ROM, Elevations, Therapeutic exercise, Endurance training, Bed mobility, Gait training, OT  Equipment Recommended: Walker       See flowsheet documentation for full assessment, interventions and recommendations  Outcome: Not Progressing  Note:   Prognosis: Good  Problem List: Decreased strength, Decreased range of motion, Decreased endurance, Impaired balance, Decreased mobility, Decreased coordination, Pain, Orthopedic restrictions  Assessment: Pt seen for PT treatment session this date with interventions consisting of gait training w/ emphasis on improving pt's ability to ambulate level surfaces x 2 feet total with mod A of 2 provided by therapist with RW and therapeutic activity consisting of training: bed mobility, supine<>sit transfers, sit<>stand transfers, static sitting tolerance at EOB for 10 minutes w/ B UE support, static standing tolerance for 2 minutes w/ B UE support, vc and tactile cues for static sitting posture faciliation and vc and tactile cues for static standing posture faciliation  Pt agreeable to PT treatment session upon arrival, pt found supine in bed w/ HOB elevated, A&O x 4 and L hip pain 5/10  In comparison to previous session, pt with no improvements as evidenced by required assist level, ambulatory distance  Post session: pt returned BTB, all needs in reach and RN notified of session findings/recommendations Continue to recommend STR at time of d/c in order to maximize pt's functional independence and safety w/ mobility   Pt continues to be functioning below baseline level, and remains limited 2* factors listed above and including weakness, gait dysfunction, impaired balance, decreased endurance, decreased activity tolerance, pain, decline from PLOF  PT will continue to see pt while here in order to address the deficits listed above and provide interventions consistent w/ POC in effort to achieve LTGs  Recommendation: Short-term skilled PT     PT - OK to Discharge: Yes(if medically stable to STR)    See flowsheet documentation for full assessment

## 2019-11-17 NOTE — ASSESSMENT & PLAN NOTE
· This was the initial cause of the hip fracture This was a simple mechanical fall - patient lost her balance while climbing to get something off the top shelf at [facility]

## 2019-11-17 NOTE — ASSESSMENT & PLAN NOTE
· Resolved  · Status post Levophed therapy in the postop period  · Was downgraded from the ICU on 11/16/2019  · Blood pressures have been stable since

## 2019-11-17 NOTE — PROGRESS NOTES
Progress Note - Burgess Loyola 1940, 70 y o  female MRN: [id number]    Unit/Bed#: -02 Encounter: [id number]    Primary Care Provider: Roceal Fields DO   Date and time admitted to hospital: 11/14/2019  1:19 PM        * Closed left hip fracture [facility])  Assessment & Plan  · Patient is status post an ORIF of the right hip on 11/15/2019  · Postop day 2  · All management, including pain and DVT prophylaxis is per Orthopedics    Acute blood loss anemia  Assessment & Plan  · Secondary to an acute blood loss anemia with a component of a dilutional effect from the IV fluids that the patient was receiving  · Initial hemoglobin was 12 0 - when the hemoglobin dropped to 8 she received 2 units of blood that brought her hemoglobin back up to 10 4, hemoglobin is back down to 7 6, will transfuse 2 units of PRBCs today  · No active bleeding  · Will have orthopedics to re-evaluate    Postoperative hypotension  Assessment & Plan  · Resolved  · Status post Levophed therapy in the postop period  · Was downgraded from the ICU on 11/16/2019  · Blood pressures have been stable since    Benign essential HTN  Assessment & Plan  · Blood pressures have remained stable  · Antihypertensives remain on hold    Fall from ground level  Assessment & Plan  · This was the initial cause of the hip fracture This was a simple mechanical fall - patient lost her balance while climbing to get something off the top shelf at [facility]    Low grade fever  Assessment & Plan  · Secondary to suspected atelectasis  · A fevers continuing to postop day 3 which would be tomorrow, would recommend checking a UA        VTE Pharmacologic Prophylaxis: Pharmacologic: Enoxaparin (Lovenox)    Patient Centered Rounds: I have performed bedside rounds with nursing staff today      Discussions with Specialists or Other Care Team Provider:  Case management, nursing and pharmacy  Education and Discussions with Family / Patient:  Patient was brought up to par with the plan of care for today, all questions answered to her satisfaction    Current Length of Stay: 3 day(s)    Current Patient Status: Inpatient   Certification Statement: The patient will continue to require additional inpatient hospital stay due to The need for blood transfusions    Discharge Plan:  Hopeful discharge planning tomorrow if the H&H is stable, and case management has the ability to set her up at a rehab facility    Code Status: Level 1 - Full Code    Subjective:   Patient seen and examined  Patient feels a little tired  She would like to be repositioned in bed  Objective:     Vitals:   Temp (24hrs), Av 5 °F (37 5 °C), Min:98 4 °F (36 9 °C), Max:100 1 °F (37 8 °C)    Temp:  [98 4 °F (36 9 °C)-100 1 °F (37 8 °C)] 100 °F (37 8 °C)  HR:  [] 90  Resp:  [18-20] 18  BP: (115-135)/(54-63) 132/63  SpO2:  [95 %-98 %] 95 %  Body mass index is 24 14 kg/m²  Input and Output Summary (last 24 hours): Intake/Output Summary (Last 24 hours) at 2019 1158  Last data filed at 2019 0241  Gross per 24 hour   Intake 260 ml   Output 400 ml   Net -140 ml       Physical Exam:     Physical Exam   Constitutional: She is oriented to person, place, and time  She appears well-developed and well-nourished  HENT:   Head: Normocephalic and atraumatic  Nose: Nose normal    Mouth/Throat: Oropharynx is clear and moist    Eyes: Pupils are equal, round, and reactive to light  Conjunctivae and EOM are normal    Neck: Normal range of motion  Neck supple  No JVD present  No thyromegaly present  Cardiovascular: Normal rate, regular rhythm and intact distal pulses  Exam reveals no gallop and no friction rub  No murmur heard  Pulmonary/Chest: Effort normal and breath sounds normal  No respiratory distress  Abdominal: Soft  Bowel sounds are normal  She exhibits no distension and no mass  There is no tenderness  There is no guarding  Musculoskeletal: Normal range of motion  She exhibits no edema     Left hip dressing is in place   Lymphadenopathy:     She has no cervical adenopathy  Neurological: She is alert and oriented to person, place, and time  No cranial nerve deficit  Skin: Skin is warm  No rash noted  No erythema  Psychiatric: She has a normal mood and affect  Her behavior is normal    Vitals reviewed  Additional Data:     Labs:    Results from last 7 days   Lab Units 11/17/19  0453   WBC Thousand/uL 9 90   HEMOGLOBIN g/dL 7 6*   HEMATOCRIT % 21 9*   PLATELETS Thousands/uL 172   NEUTROS PCT % 70   LYMPHS PCT % 16*   MONOS PCT % 12   EOS PCT % 1     Results from last 7 days   Lab Units 11/17/19  0453   POTASSIUM mmol/L 4 3   CHLORIDE mmol/L 109*   CO2 mmol/L 25   BUN mg/dL 16   CREATININE mg/dL 0 90   CALCIUM mg/dL 7 8*                   * I Have Reviewed All Lab Data Listed Above  * Additional Pertinent Lab Tests Reviewed: Clarissa 66 Admission  Reviewed    Imaging:  Imaging Reports Reviewed Today Include:  None    Recent Cultures (last 7 days):           Last 24 Hours Medication List:     Current Facility-Administered Medications:  acetaminophen 650 mg Oral Q6H PRN Nino Calvin MD   acetaminophen 975 mg Oral [facility] Nino Calvin MD   Apoaequorin 1 capsule Oral Daily WAGNER Chapman   enoxaparin 40 mg Subcutaneous Q24H 2020 Robel Villarreal MD   gabapentin 100 mg Oral TID Nino Calvin MD   HYDROcodone-acetaminophen 1 tablet Oral Q6H PRN Nino Calvin MD   morphine injection 2 mg Intravenous Q4H PRN Nino Calvin MD   ondansetron 4 mg Intravenous Q6H PRN Nino Calvin MD   senna-docusate sodium 2 tablet Oral HS Irma Ortiz PA-C        Today, Patient Was Seen By: Nino Calvin MD    ** Please Note: Dictation voice to text software may have been used in the creation of this document   **

## 2019-11-18 ENCOUNTER — TELEPHONE (OUTPATIENT)
Dept: OBGYN CLINIC | Facility: HOSPITAL | Age: 79
End: 2019-11-18

## 2019-11-18 LAB
ABO GROUP BLD BPU: NORMAL
ABO GROUP BLD BPU: NORMAL
ANION GAP SERPL CALCULATED.3IONS-SCNC: 6 MMOL/L (ref 4–13)
BASOPHILS # BLD AUTO: 0 THOUSANDS/ΜL (ref 0–0.1)
BASOPHILS NFR BLD AUTO: 1 % (ref 0–2)
BPU ID: NORMAL
BPU ID: NORMAL
BUN SERPL-MCNC: 11 MG/DL (ref 7–25)
CALCIUM SERPL-MCNC: 7.8 MG/DL (ref 8.6–10.5)
CHLORIDE SERPL-SCNC: 108 MMOL/L (ref 98–107)
CO2 SERPL-SCNC: 25 MMOL/L (ref 21–31)
CREAT SERPL-MCNC: 0.73 MG/DL (ref 0.6–1.2)
CROSSMATCH: NORMAL
CROSSMATCH: NORMAL
EOSINOPHIL # BLD AUTO: 0.2 THOUSAND/ΜL (ref 0–0.61)
EOSINOPHIL NFR BLD AUTO: 3 % (ref 0–5)
ERYTHROCYTE [DISTWIDTH] IN BLOOD BY AUTOMATED COUNT: 17.1 % (ref 11.5–14.5)
GFR SERPL CREATININE-BSD FRML MDRD: 80 ML/MIN/1.73SQ M
GLUCOSE SERPL-MCNC: 93 MG/DL (ref 65–99)
HCT VFR BLD AUTO: 28.5 % (ref 42–47)
HCT VFR BLD AUTO: 29.3 % (ref 42–47)
HGB BLD-MCNC: 9.7 G/DL (ref 12–16)
HGB BLD-MCNC: 9.9 G/DL (ref 12–16)
LYMPHOCYTES # BLD AUTO: 1.6 THOUSANDS/ΜL (ref 0.6–4.47)
LYMPHOCYTES NFR BLD AUTO: 19 % (ref 21–51)
MCH RBC QN AUTO: 28.9 PG (ref 26–34)
MCHC RBC AUTO-ENTMCNC: 33.9 G/DL (ref 31–37)
MCV RBC AUTO: 85 FL (ref 81–99)
MONOCYTES # BLD AUTO: 0.9 THOUSAND/ΜL (ref 0.17–1.22)
MONOCYTES NFR BLD AUTO: 11 % (ref 2–12)
NEUTROPHILS # BLD AUTO: 5.6 THOUSANDS/ΜL (ref 1.4–6.5)
NEUTS SEG NFR BLD AUTO: 67 % (ref 42–75)
PLATELET # BLD AUTO: 118 THOUSANDS/UL (ref 149–390)
PMV BLD AUTO: 8.9 FL (ref 8.6–11.7)
POTASSIUM SERPL-SCNC: 3.9 MMOL/L (ref 3.5–5.5)
RBC # BLD AUTO: 3.35 MILLION/UL (ref 3.9–5.2)
SODIUM SERPL-SCNC: 139 MMOL/L (ref 134–143)
UNIT DISPENSE STATUS: NORMAL
UNIT DISPENSE STATUS: NORMAL
UNIT PRODUCT CODE: NORMAL
UNIT PRODUCT CODE: NORMAL
UNIT RH: NORMAL
UNIT RH: NORMAL
WBC # BLD AUTO: 8.4 THOUSAND/UL (ref 4.8–10.8)

## 2019-11-18 PROCEDURE — 85018 HEMOGLOBIN: CPT | Performed by: HOSPITALIST

## 2019-11-18 PROCEDURE — 97116 GAIT TRAINING THERAPY: CPT

## 2019-11-18 PROCEDURE — 85014 HEMATOCRIT: CPT | Performed by: HOSPITALIST

## 2019-11-18 PROCEDURE — 99232 SBSQ HOSP IP/OBS MODERATE 35: CPT | Performed by: PHYSICIAN ASSISTANT

## 2019-11-18 PROCEDURE — 85025 COMPLETE CBC W/AUTO DIFF WBC: CPT | Performed by: HOSPITALIST

## 2019-11-18 PROCEDURE — 97535 SELF CARE MNGMENT TRAINING: CPT

## 2019-11-18 PROCEDURE — 97530 THERAPEUTIC ACTIVITIES: CPT

## 2019-11-18 PROCEDURE — 80048 BASIC METABOLIC PNL TOTAL CA: CPT | Performed by: HOSPITALIST

## 2019-11-18 RX ORDER — LISINOPRIL 5 MG/1
5 TABLET ORAL ONCE
Status: COMPLETED | OUTPATIENT
Start: 2019-11-18 | End: 2019-11-18

## 2019-11-18 RX ORDER — POLYETHYLENE GLYCOL 3350 17 G/17G
17 POWDER, FOR SOLUTION ORAL DAILY
Status: DISCONTINUED | OUTPATIENT
Start: 2019-11-18 | End: 2019-11-20 | Stop reason: HOSPADM

## 2019-11-18 RX ORDER — ACETAMINOPHEN 325 MG/1
650 TABLET ORAL EVERY 8 HOURS SCHEDULED
Status: DISCONTINUED | OUTPATIENT
Start: 2019-11-19 | End: 2019-11-20 | Stop reason: HOSPADM

## 2019-11-18 RX ORDER — BISACODYL 10 MG
10 SUPPOSITORY, RECTAL RECTAL DAILY PRN
Status: DISCONTINUED | OUTPATIENT
Start: 2019-11-18 | End: 2019-11-20 | Stop reason: HOSPADM

## 2019-11-18 RX ORDER — LISINOPRIL 10 MG/1
10 TABLET ORAL DAILY
Status: DISCONTINUED | OUTPATIENT
Start: 2019-11-19 | End: 2019-11-20 | Stop reason: HOSPADM

## 2019-11-18 RX ADMIN — ACETAMINOPHEN 975 MG: 325 TABLET ORAL at 05:33

## 2019-11-18 RX ADMIN — TEMAZEPAM 15 MG: 15 CAPSULE ORAL at 21:07

## 2019-11-18 RX ADMIN — ACETAMINOPHEN 975 MG: 325 TABLET ORAL at 13:35

## 2019-11-18 RX ADMIN — GABAPENTIN 100 MG: 100 CAPSULE ORAL at 17:08

## 2019-11-18 RX ADMIN — HYDROCODONE BITARTRATE AND ACETAMINOPHEN 1 TABLET: 5; 325 TABLET ORAL at 17:26

## 2019-11-18 RX ADMIN — HYDROCODONE BITARTRATE AND ACETAMINOPHEN 1 TABLET: 5; 325 TABLET ORAL at 07:00

## 2019-11-18 RX ADMIN — POLYETHYLENE GLYCOL 3350 17 G: 17 POWDER, FOR SOLUTION ORAL at 13:26

## 2019-11-18 RX ADMIN — SENNOSIDES AND DOCUSATE SODIUM 2 TABLET: 8.6; 5 TABLET ORAL at 21:06

## 2019-11-18 RX ADMIN — BISACODYL 10 MG: 10 SUPPOSITORY RECTAL at 13:26

## 2019-11-18 RX ADMIN — GABAPENTIN 100 MG: 100 CAPSULE ORAL at 08:17

## 2019-11-18 RX ADMIN — GABAPENTIN 100 MG: 100 CAPSULE ORAL at 21:06

## 2019-11-18 RX ADMIN — ENOXAPARIN SODIUM 40 MG: 40 INJECTION SUBCUTANEOUS at 08:17

## 2019-11-18 RX ADMIN — LISINOPRIL 5 MG: 5 TABLET ORAL at 17:08

## 2019-11-18 NOTE — PHYSICAL THERAPY NOTE
Physical Therapy Treatment Session Note    Patient's Name: Mars Workman    Admitting Diagnosis  Hip fracture ([facility] [address] ) Ruddy Claire  Hip injury [[id number]]    Problem List  Patient Active Problem List   Diagnosis    Closed left hip fracture ([facility] [address] )    Fall from ground level    Benign essential HTN    Closed fracture of left hip ([facility] [address] )    Acute blood loss anemia    Postoperative hypotension    Low grade fever       Past Medical History  Past Medical History:   Diagnosis Date    Hypertension        Past Surgical History  Past Surgical History:   Procedure Laterality Date    [place] OPEN RX FEMUR FX+INTRAMED LOWELL Left 11/15/2019    Procedure: INSERTION NAIL IM FEMUR ANTEGRADE (TROCHANTERIC); Surgeon: Cathie Geiger MD;  Location: [address] [place] [address] OR;  Service: Orthopedics        11/18/19 0808   Pain Assessment   Pain Assessment 0-10   Pain Score 9  (0/10 at rest)   Pain Type Acute pain;Surgical pain   Pain Location Hip   Pain Orientation Left   Pain Descriptors Aching; Sharp   Pain Frequency Intermittent  (w/mobility)   Pain Onset Ongoing   Clinical Progression Not changed   Patient's Stated Pain Goal No pain   Hospital Pain Intervention(s) Medication (See MAR); Repositioned; Ambulation/increased activity; Elevated  (pt  had pain medications 0700,confirmed by Suellen Colvin)   Multiple Pain Sites No   Restrictions/Precautions   Weight Bearing Precautions Per Order Yes   LLE Weight Bearing Per Order WBAT   Other Precautions WBS; Bed Alarm; Fall Risk;Pain   General   Chart Reviewed Yes   Response to Previous Treatment Patient with no complaints from previous session  Family/Caregiver Present No   Cognition   Overall Cognitive Status WFL   Arousal/Participation Alert; Cooperative   Attention Attends with cues to redirect   Orientation Level Oriented X4   Memory Within functional limits   Following Commands Follows one step commands with increased time or repetition   Subjective   Subjective "I'd like to try going to the bathroom"   Bed Mobility Supine to Sit 3  Moderate assistance   Additional items Assist x 2;HOB elevated; Bedrails; Increased time required;Verbal cues;LE management   Sit to Supine 3  Moderate assistance   Additional items Assist x 2;Bedrails; Increased time required;Verbal cues;LE management   Transfers   Sit to Stand 3  Moderate assistance   Additional items Assist x 2;Bedrails; Increased time required;Verbal cues   Stand to Sit 3  Moderate assistance   Additional items Assist x 2;Bedrails; Increased time required;Verbal cues   Stand pivot 3  Moderate assistance  (mod A <->max A)   Additional items Assist x 2;Armrests; Increased time required;Verbal cues   Toilet transfer 3  Moderate assistance  (mod A <->max A )   Additional items Assist x 2; Increased time required;Armrests; Verbal cues; Commode;Raised toilet seat   Ambulation/Elevation   Gait pattern Improper Weight shift; Antalgic;Narrow RUTHY; Forward Flexion;Decreased foot clearance; Inconsistent chacho;Decreased L stance; Short stride   Gait Assistance 3  Moderate assist   Additional items Assist x 2;Verbal cues; Tactile cues  (consistently for RUTHY adjustment, directional changes x 2)   Assistive Device Rolling walker   Distance 3 feet x 2  (bedside<->commode)   Stair Management Assistance Not tested   Balance   Static Sitting Fair +   Dynamic Sitting Fair +   Static Standing Fair   Dynamic Standing Fair -   Ambulatory Fair -   Endurance Deficit   Endurance Deficit Yes   Activity Tolerance   Activity Tolerance Patient limited by fatigue;Patient limited by pain   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[employer], nursing student   Assessment   Prognosis Good   Problem List Decreased strength;Decreased range of motion;Decreased endurance; Impaired balance;Decreased mobility; Decreased coordination;Pain;Orthopedic restrictions   Assessment Pt seen for PT treatment session this date with interventions consisting of gait training w/ emphasis on improving pt's ability to ambulate level surfaces x 6 feet total with mod A provided by therapist with RW and therapeutic activity consisting of training: bed mobility, supine<>sit transfers, sit<>stand transfers, static sitting tolerance at EOB for 5 minutes w/ B UE support, static standing tolerance for 2 minutes w/ B UE support, vc and tactile cues for static sitting posture faciliation, vc and tactile cues for static standing posture faciliation, stand pivot transfers towards B direction and toileting w/assist for renetta care  Pt agreeable to PT treatment session upon arrival, pt found supine in bed w/ HOB elevated, in no apparent distress, A&O x 4 and no pain at rest, 9/10 L hip pain w/activity  In comparison to previous session, pt with improvements in ambulatory balance, stand<->pivot to/from bedside,commode  Post session: pt returned BTB, all needs in reach and RN notified of session findings/recommendations Continue to recommend STR at time of d/c in order to maximize pt's functional independence and safety w/ mobility  Pt continues to be functioning below baseline level, and remains limited 2* factors listed above and including weakness, impaired balance, decreased endurance, decreased activity tolerance, pain, gait dysfunction, decline from PLOF  PT will continue to see pt while here in order to address the deficits listed above and provide interventions consistent w/ POC in effort to achieve LTGs  Goals   Patient Goals get moving better   LTG Expiration Date 11/26/19   Long Term Goal #1 LTGs remain appropriate   PT Treatment Day 3   Plan   Treatment/Interventions Functional transfer training;LE strengthening/ROM; Therapeutic exercise; Endurance training;Patient/family training;Equipment eval/education; Bed mobility;Gait training;Spoke to nursing;OT   Progress Slow progress, decreased activity tolerance   PT Frequency 7x/wk   Recommendation   Recommendation Short-term skilled PT   Equipment Recommended Walker   PT - OK to Discharge Yes  (if medically stable to STR) Additional Comments Upon conclusion, pt  was resting in bed w/SCD's active & all needs within reach       Katie Jordan PT

## 2019-11-18 NOTE — PLAN OF CARE
Problem: Potential for Falls  Goal: Patient will remain free of falls  Description  INTERVENTIONS:  - Assess patient frequently for physical needs  -  Identify cognitive and physical deficits and behaviors that affect risk of falls    -  [place] fall precautions as indicated by assessment   - Educate patient/family on patient safety including physical limitations  - Instruct patient to call for assistance with activity based on assessment  - Modify environment to reduce risk of injury  - Consider OT/PT consult to assist with strengthening/mobility  Outcome: Progressing     Problem: Prexisting or High Potential for Compromised Skin Integrity  Goal: Skin integrity is maintained or improved  Description  INTERVENTIONS:  - Identify patients at risk for skin breakdown  - Assess and monitor skin integrity  - Assess and monitor nutrition and hydration status  - Monitor labs   - Assess for incontinence   - Turn and reposition patient  - Assist with mobility/ambulation  - Relieve pressure over bony prominences  - Avoid friction and shearing  - Provide appropriate hygiene as needed including keeping skin clean and dry  - Evaluate need for skin moisturizer/barrier cream  - Collaborate with interdisciplinary team   - Patient/family teaching  - Consider wound care consult   Outcome: Progressing     Problem: RESPIRATORY - ADULT  Goal: Achieves optimal ventilation and oxygenation  Description  INTERVENTIONS:  - Assess for changes in respiratory status  - Assess for changes in mentation and behavior  - Position to facilitate oxygenation and minimize respiratory effort  - Oxygen administered by appropriate delivery if ordered  - Initiate smoking cessation education as indicated  - Encourage broncho-pulmonary hygiene including cough, deep breathe, Incentive Spirometry  - Assess the need for suctioning and aspirate as needed  - Assess and instruct to report SOB or any respiratory difficulty  - Respiratory Therapy support as indicated  Outcome: Progressing     Problem: GENITOURINARY - ADULT  Goal: Maintains or returns to baseline urinary function  Description  INTERVENTIONS:  - Assess urinary function  - Encourage oral fluids to ensure adequate hydration if ordered  - Administer IV fluids as ordered to ensure adequate hydration  - Administer ordered medications as needed  - Offer frequent toileting  - Follow urinary retention protocol if ordered  Outcome: Progressing  Goal: Absence of urinary retention  Description  INTERVENTIONS:  - Assess patients ability to void and empty bladder  - Monitor I/O  - Bladder scan as needed  - Discuss with physician/AP medications to alleviate retention as needed  - Discuss catheterization for long term situations as appropriate  Outcome: Progressing     Problem: METABOLIC, FLUID AND ELECTROLYTES - ADULT  Goal: Electrolytes maintained within normal limits  Description  INTERVENTIONS:  - Monitor labs and assess patient for signs and symptoms of electrolyte imbalances  - Administer electrolyte replacement as ordered  - Monitor response to electrolyte replacements, including repeat lab results as appropriate  - Instruct patient on fluid and nutrition as appropriate  Outcome: Progressing  Goal: Fluid balance maintained  Description  INTERVENTIONS:  - Monitor labs   - Monitor I/O and WT  - Instruct patient on fluid and nutrition as appropriate  - Assess for signs & symptoms of volume excess or deficit  Outcome: Progressing  Goal: Glucose maintained within target range  Description  INTERVENTIONS:  - Monitor Blood Glucose as ordered  - Assess for signs and symptoms of hyperglycemia and hypoglycemia  - Administer ordered medications to maintain glucose within target range  - Assess nutritional intake and initiate nutrition service referral as needed  Outcome: Progressing     Problem: SKIN/TISSUE INTEGRITY - ADULT  Goal: Skin integrity remains intact  Description  INTERVENTIONS  - Identify patients at risk for skin breakdown  - Assess and monitor skin integrity  - Assess and monitor nutrition and hydration status  - Monitor labs (i e  albumin)  - Assess for incontinence   - Turn and reposition patient  - Assist with mobility/ambulation  - Relieve pressure over bony prominences  - Avoid friction and shearing  - Provide appropriate hygiene as needed including keeping skin clean and dry  - Evaluate need for skin moisturizer/barrier cream  - Collaborate with interdisciplinary team (i e  Nutrition, Rehabilitation, etc )   - Patient/family teaching  Outcome: Progressing  Goal: Incision(s), wounds(s) or drain site(s) healing without S/S of infection  Description  INTERVENTIONS  - Assess and document risk factors for skin impairment   - Assess and document dressing, incision, wound bed, drain sites and surrounding tissue  - Consider nutrition services referral as needed  - Oral mucous membranes remain intact  - Provide patient/ family education  Outcome: Progressing  Goal: Oral mucous membranes remain intact  Description  INTERVENTIONS  - Assess oral mucosa and hygiene practices  - Implement preventative oral hygiene regimen  - Implement oral medicated treatments as ordered  - Initiate Nutrition services referral as needed  Outcome: Progressing     Problem: HEMATOLOGIC - ADULT  Goal: Maintains hematologic stability  Description  INTERVENTIONS  - Assess for signs and symptoms of bleeding or hemorrhage  - Monitor labs  - Administer supportive blood products/factors as ordered and appropriate  Outcome: Progressing     Problem: MUSCULOSKELETAL - ADULT  Goal: Maintain or return mobility to safest level of function  Description  INTERVENTIONS:  - Assess patient's ability to carry out ADLs; assess patient's baseline for ADL function and identify physical deficits which impact ability to perform ADLs (bathing, care of mouth/teeth, toileting, grooming, dressing, etc )  - Assess/evaluate cause of self-care deficits   - Assess range of motion  - Assess patient's mobility  - Assess patient's need for assistive devices and provide as appropriate  - Encourage maximum independence but intervene and supervise when necessary  - Involve family in performance of ADLs  - Assess for home care needs following discharge   - Consider OT consult to assist with ADL evaluation and planning for discharge  - Provide patient education as appropriate  Outcome: Progressing  Goal: Maintain proper alignment of affected body part  Description  INTERVENTIONS:  - Support, maintain and protect limb and body alignment  - Provide patient/ family with appropriate education  Outcome: Progressing     Problem: PAIN - ADULT  Goal: Verbalizes/displays adequate comfort level or baseline comfort level  Description  Interventions:  - Encourage patient to monitor pain and request assistance  - Assess pain using appropriate pain scale  - Administer analgesics based on type and severity of pain and evaluate response  - Implement non-pharmacological measures as appropriate and evaluate response  - Consider cultural and social influences on pain and pain management  - Notify physician/advanced practitioner if interventions unsuccessful or patient reports new pain  Outcome: Progressing     Problem: INFECTION - ADULT  Goal: Absence or prevention of progression during hospitalization  Description  INTERVENTIONS:  - Assess and monitor for signs and symptoms of infection  - Monitor lab/diagnostic results  - Monitor all insertion sites, i e  indwelling lines, tubes, and drains  - Monitor endotracheal if appropriate and nasal secretions for changes in amount and color  - [place] appropriate cooling/warming therapies per order  - Administer medications as ordered  - Instruct and encourage patient and family to use good hand hygiene technique  - Identify and instruct in appropriate isolation precautions for identified infection/condition  Outcome: Progressing  Goal: Absence of fever/infection during neutropenic period  Description  INTERVENTIONS:  - Monitor WBC    Outcome: Progressing     Problem: SAFETY ADULT  Goal: Maintain or return to baseline ADL function  Description  INTERVENTIONS:  -  Assess patient's ability to carry out ADLs; assess patient's baseline for ADL function and identify physical deficits which impact ability to perform ADLs (bathing, care of mouth/teeth, toileting, grooming, dressing, etc )  - Assess/evaluate cause of self-care deficits   - Assess range of motion  - Assess patient's mobility; develop plan if impaired  - Assess patient's need for assistive devices and provide as appropriate  - Encourage maximum independence but intervene and supervise when necessary  - Involve family in performance of ADLs  - Assess for home care needs following discharge   - Consider OT consult to assist with ADL evaluation and planning for discharge  - Provide patient education as appropriate  Outcome: Progressing  Goal: Maintain or return mobility status to optimal level  Description  INTERVENTIONS:  - Assess patient's baseline mobility status (ambulation, transfers, stairs, etc )    - Identify cognitive and physical deficits and behaviors that affect mobility  - Identify mobility aids required to assist with transfers and/or ambulation (gait belt, sit-to-stand, lift, walker, cane, etc )  - [place] fall precautions as indicated by assessment  - Record patient progress and toleration of activity level on Mobility SBAR; progress patient to next Phase/Stage  - Instruct patient to call for assistance with activity based on assessment  - Consider rehabilitation consult to assist with strengthening/weightbearing, etc   Outcome: Progressing     Problem: DISCHARGE PLANNING  Goal: Discharge to home or other facility with appropriate resources  Description  INTERVENTIONS:  - Identify barriers to discharge w/patient and caregiver  - Arrange for needed discharge resources and transportation as appropriate  - Identify discharge learning needs (meds, wound care, etc )  - Arrange for interpretive services to assist at discharge as needed  - Refer to Case Management Department for coordinating discharge planning if the patient needs post-hospital services based on physician/advanced practitioner order or complex needs related to functional status, cognitive ability, or social support system  Outcome: Progressing     Problem: Knowledge Deficit  Goal: Patient/family/caregiver demonstrates understanding of disease process, treatment plan, medications, and discharge instructions  Description  Complete learning assessment and assess knowledge base    Interventions:  - Provide teaching at level of understanding  - Provide teaching via preferred learning methods  Outcome: Progressing

## 2019-11-18 NOTE — PLAN OF CARE
Problem: OCCUPATIONAL THERAPY ADULT  Goal: Performs self-care activities at highest level of function for planned discharge setting  See evaluation for individualized goals  Description  Treatment Interventions: ADL retraining, Functional transfer training, Endurance training, Neuromuscular reeducation, Energy conservation, Equipment evaluation/education    See flowsheet documentation for full assessment, interventions and recommendations  Outcome: Progressing (minimal)  Note:   Limitation: Decreased ADL status, Decreased Safe judgement during ADL, Decreased endurance, Decreased self-care trans, Decreased high-level ADLs  Prognosis: Good  Assessment: Patient participated in Skilled OT session this date with interventions consisting of ADL re training with the use of correct body mechnaics and maintaining Total Hip precautions   Patient agreeable to OT treatment session, upon arrival patient was found seated in bed (pt  Chose to remain in same position)  Patient requiring frequent re direction and verbal cues for correct technique and occasional rest periods  Patient continues to be functioning below baseline level, occupational performance remains limited secondary to factors listed above and increased risk for falls and injury  From OT standpoint, recommendation at time of d/c would be Short Term Rehab  Patient to benefit from continued Occupational Therapy treatment while in the hospital to address deficits as defined above and maximize level of functional independence with ADLs and functional mobility        OT Discharge Recommendation: Short Term Rehab  OT - OK to Discharge: Yes(Once medically cleared)  GILDARDO Morgan

## 2019-11-18 NOTE — ASSESSMENT & PLAN NOTE
· Secondary to suspected atelectasis  · A fevers continuing to postop day 3 which would be tomorrow, would recommend checking a UA  · WBCs are normal

## 2019-11-18 NOTE — PROGRESS NOTES
Progress Note - Shayy Swenson 1940, 70 y o  female MRN: [id number]    Unit/Bed#: -02 Encounter: [id number]    Primary Care Provider: Marian Rueda,    Date and time admitted to hospital: 11/14/2019  1:19 PM        * Closed left hip fracture [facility])  Assessment & Plan  · Patient is status post an ORIF of the right hip on 11/15/2019  · Postop day 3  · All management, including pain and DVT prophylaxis is per Orthopedics    Acute blood loss anemia  Assessment & Plan  · Secondary to an acute blood loss anemia with a component of a dilutional effect from the IV fluids that the patient was receiving  · Initial hemoglobin was 12 0 - when the hemoglobin dropped to 8 she received 2 units of blood that brought her hemoglobin back up to 10 4, hemoglobin is back down to 7 6, will transfuse 2 units of PRBCs today   · Hemoglobin currently 9 7 on 11/18  · No active bleeding      Postoperative hypotension  Assessment & Plan  · Resolved  · Status post Levophed therapy in the postop period  · Was downgraded from the ICU on 11/16/2019  · Blood pressures have been stable since    Low grade fever  Assessment & Plan  · Secondary to suspected atelectasis  · A fevers continuing to postop day 3 which would be tomorrow, would recommend checking a UA  · WBCs are normal    Benign essential HTN  Assessment & Plan  · BP increasing will resume her home medicine    Fall from ground level  Assessment & Plan  · This was the initial cause of the hip fracture This was a simple mechanical fall - patient lost her balance while climbing to get something off the top shelf at [facility]      VTE Pharmacologic Prophylaxis: Pharmacologic: Enoxaparin (Lovenox)    Patient Centered Rounds: I have performed bedside rounds with nursing staff today      Discussions with Specialists or Other Care Team Provider:  Case management, care coordination team  Education and Discussions with Family / Patient:  Patient    Current Length of Stay: 4 day(s)    Current Patient Status: Inpatient   Certification Statement: The patient will continue to require additional inpatient hospital stay due to Insurance reports lack medical complexity for acute rehab, did not approve acute rehab unit, case management needs to request a new authorization for discharge planning with her insurance    Discharge Plan:  Await insurance authorization and discharge planning with case management    Code Status: Level 1 - Full Code    Subjective:   Patient seen in bed  She reports some constipation  She has no chest pain or shortness breath    Objective:     Vitals:   Temp (24hrs), Av 3 °F (37 4 °C), Min:98 7 °F (37 1 °C), Max:99 5 °F (37 5 °C)    Temp:  [98 7 °F (37 1 °C)-99 5 °F (37 5 °C)] 99 4 °F (37 4 °C)  HR:  [66-85] 66  Resp:  [18-20] 18  BP: (138-174)/(62-75) 174/75  SpO2:  [95 %-97 %] 95 %  Body mass index is 24 14 kg/m²  Input and Output Summary (last 24 hours): Intake/Output Summary (Last 24 hours) at 2019 1539  Last data filed at 2019 1233  Gross per 24 hour   Intake 1176 ml   Output 250 ml   Net 926 ml       Physical Exam:     Physical Exam   Constitutional: She is oriented to person, place, and time  She appears well-developed and well-nourished  Pleasant elderly  female   HENT:   Head: Normocephalic and atraumatic  Eyes: Conjunctivae and EOM are normal  Right eye exhibits no discharge  Left eye exhibits no discharge  Neck: Normal range of motion  No tracheal deviation present  Cardiovascular: Normal rate, regular rhythm and normal heart sounds  Exam reveals no gallop and no friction rub  No murmur heard  Pulmonary/Chest: Effort normal and breath sounds normal  No respiratory distress  She has no wheezes  She has no rales  Abdominal: Soft  Bowel sounds are normal  She exhibits no distension and no mass  There is no tenderness  There is no guarding  Musculoskeletal: Normal range of motion   She exhibits deformity (Left hip with dressing in place clean dry intact)  She exhibits no edema or tenderness  Lower extremities in vena dynes   Neurological: She is alert and oriented to person, place, and time  Skin: Skin is warm and dry  No rash noted  No erythema  No pallor  Psychiatric: She has a normal mood and affect  Her behavior is normal  Judgment and thought content normal    Nursing note and vitals reviewed  Additional Data:     Labs:    Results from last 7 days   Lab Units 11/18/19  0540   WBC Thousand/uL 8 40   HEMOGLOBIN g/dL 9 7*   HEMATOCRIT % 28 5*   PLATELETS Thousands/uL 118*   NEUTROS PCT % 67   LYMPHS PCT % 19*   MONOS PCT % 11   EOS PCT % 3     Results from last 7 days   Lab Units 11/18/19  0540   POTASSIUM mmol/L 3 9   CHLORIDE mmol/L 108*   CO2 mmol/L 25   BUN mg/dL 11   CREATININE mg/dL 0 73   CALCIUM mg/dL 7 8*     * I Have Reviewed All Lab Data Listed Above  * Additional Pertinent Lab Tests Reviewed:  Clarissa Travis Admission  Reviewed      Last 24 Hours Medication List:     Current Facility-Administered Medications:  acetaminophen 650 mg Oral Q6H PRN Keyon Milian MD   acetaminophen 975 mg Oral [facility] Keyon Milian MD   Apoaequorin 1 capsule Oral Daily Lamond Pu, CRNP   bisacodyl 10 mg Rectal Daily PRN Ashu Rae PA-C   enoxaparin 40 mg Subcutaneous Q24H 2020 Robel Villarreal MD   gabapentin 100 mg Oral TID Keyon Milian MD   HYDROcodone-acetaminophen 1 tablet Oral Q6H PRN Keyon Milian MD   morphine injection 2 mg Intravenous Q4H PRN Keyon Milian MD   ondansetron 4 mg Intravenous Q6H PRN Keyon Milian MD   polyethylene glycol 17 g Oral Daily Ashu Rae PA-C   senna-docusate sodium 2 tablet Oral HS Irineo Arriaga PA-C   temazepam 15 mg Oral HS PRN Irineo Arriaga PA-C        Today, Patient Was Seen By: Ashu Rae PA-C    ** Please Note: Dictation voice to text software may have been used in the creation of this document   **

## 2019-11-18 NOTE — ASSESSMENT & PLAN NOTE
· Patient is status post an ORIF of the right hip on 11/15/2019  · Postop day 3  · All management, including pain and DVT prophylaxis is per Orthopedics

## 2019-11-18 NOTE — TELEPHONE ENCOUNTER
Patient's daughter Nancy Pretty called to state that [facility] has denied her mom's acute rehab  IF you feel like acute rehab is appropriate for the patient, insurance will need evidence that acute rehab is necessary and/or beneficial to the patient  Daughter Nancy Pretty would like to know if you feel like it is the best option for her  She is willing to appeal this decision        Ghazal YOUNG#[phone]

## 2019-11-18 NOTE — PLAN OF CARE
Problem: Potential for Falls  Goal: Patient will remain free of falls  Description  INTERVENTIONS:  - Assess patient frequently for physical needs  -  Identify cognitive and physical deficits and behaviors that affect risk of falls    -  [place] fall precautions as indicated by assessment   - Educate patient/family on patient safety including physical limitations  - Instruct patient to call for assistance with activity based on assessment  - Modify environment to reduce risk of injury  - Consider OT/PT consult to assist with strengthening/mobility  Outcome: Progressing     Problem: Prexisting or High Potential for Compromised Skin Integrity  Goal: Skin integrity is maintained or improved  Description  INTERVENTIONS:  - Identify patients at risk for skin breakdown  - Assess and monitor skin integrity  - Assess and monitor nutrition and hydration status  - Monitor labs   - Assess for incontinence   - Turn and reposition patient  - Assist with mobility/ambulation  - Relieve pressure over bony prominences  - Avoid friction and shearing  - Provide appropriate hygiene as needed including keeping skin clean and dry  - Evaluate need for skin moisturizer/barrier cream  - Collaborate with interdisciplinary team   - Patient/family teaching  - Consider wound care consult   Outcome: Progressing     Problem: RESPIRATORY - ADULT  Goal: Achieves optimal ventilation and oxygenation  Description  INTERVENTIONS:  - Assess for changes in respiratory status  - Assess for changes in mentation and behavior  - Position to facilitate oxygenation and minimize respiratory effort  - Oxygen administered by appropriate delivery if ordered  - Initiate smoking cessation education as indicated  - Encourage broncho-pulmonary hygiene including cough, deep breathe, Incentive Spirometry  - Assess the need for suctioning and aspirate as needed  - Assess and instruct to report SOB or any respiratory difficulty  - Respiratory Therapy support as indicated  Outcome: Progressing     Problem: GENITOURINARY - ADULT  Goal: Maintains or returns to baseline urinary function  Description  INTERVENTIONS:  - Assess urinary function  - Encourage oral fluids to ensure adequate hydration if ordered  - Administer IV fluids as ordered to ensure adequate hydration  - Administer ordered medications as needed  - Offer frequent toileting  - Follow urinary retention protocol if ordered  Outcome: Progressing  Goal: Absence of urinary retention  Description  INTERVENTIONS:  - Assess patients ability to void and empty bladder  - Monitor I/O  - Bladder scan as needed  - Discuss with physician/AP medications to alleviate retention as needed  - Discuss catheterization for long term situations as appropriate  Outcome: Progressing     Problem: METABOLIC, FLUID AND ELECTROLYTES - ADULT  Goal: Electrolytes maintained within normal limits  Description  INTERVENTIONS:  - Monitor labs and assess patient for signs and symptoms of electrolyte imbalances  - Administer electrolyte replacement as ordered  - Monitor response to electrolyte replacements, including repeat lab results as appropriate  - Instruct patient on fluid and nutrition as appropriate  Outcome: Progressing  Goal: Fluid balance maintained  Description  INTERVENTIONS:  - Monitor labs   - Monitor I/O and WT  - Instruct patient on fluid and nutrition as appropriate  - Assess for signs & symptoms of volume excess or deficit  Outcome: Progressing  Goal: Glucose maintained within target range  Description  INTERVENTIONS:  - Monitor Blood Glucose as ordered  - Assess for signs and symptoms of hyperglycemia and hypoglycemia  - Administer ordered medications to maintain glucose within target range  - Assess nutritional intake and initiate nutrition service referral as needed  Outcome: Progressing     Problem: SKIN/TISSUE INTEGRITY - ADULT  Goal: Skin integrity remains intact  Description  INTERVENTIONS  - Identify patients at risk for skin breakdown  - Assess and monitor skin integrity  - Assess and monitor nutrition and hydration status  - Monitor labs (i e  albumin)  - Assess for incontinence   - Turn and reposition patient  - Assist with mobility/ambulation  - Relieve pressure over bony prominences  - Avoid friction and shearing  - Provide appropriate hygiene as needed including keeping skin clean and dry  - Evaluate need for skin moisturizer/barrier cream  - Collaborate with interdisciplinary team (i e  Nutrition, Rehabilitation, etc )   - Patient/family teaching  Outcome: Progressing  Goal: Incision(s), wounds(s) or drain site(s) healing without S/S of infection  Description  INTERVENTIONS  - Assess and document risk factors for skin impairment   - Assess and document dressing, incision, wound bed, drain sites and surrounding tissue  - Consider nutrition services referral as needed  - Oral mucous membranes remain intact  - Provide patient/ family education  Outcome: Progressing  Goal: Oral mucous membranes remain intact  Description  INTERVENTIONS  - Assess oral mucosa and hygiene practices  - Implement preventative oral hygiene regimen  - Implement oral medicated treatments as ordered  - Initiate Nutrition services referral as needed  Outcome: Progressing     Problem: HEMATOLOGIC - ADULT  Goal: Maintains hematologic stability  Description  INTERVENTIONS  - Assess for signs and symptoms of bleeding or hemorrhage  - Monitor labs  - Administer supportive blood products/factors as ordered and appropriate  Outcome: Progressing     Problem: MUSCULOSKELETAL - ADULT  Goal: Maintain or return mobility to safest level of function  Description  INTERVENTIONS:  - Assess patient's ability to carry out ADLs; assess patient's baseline for ADL function and identify physical deficits which impact ability to perform ADLs (bathing, care of mouth/teeth, toileting, grooming, dressing, etc )  - Assess/evaluate cause of self-care deficits   - Assess range of motion  - Assess patient's mobility  - Assess patient's need for assistive devices and provide as appropriate  - Encourage maximum independence but intervene and supervise when necessary  - Involve family in performance of ADLs  - Assess for home care needs following discharge   - Consider OT consult to assist with ADL evaluation and planning for discharge  - Provide patient education as appropriate  Outcome: Progressing  Goal: Maintain proper alignment of affected body part  Description  INTERVENTIONS:  - Support, maintain and protect limb and body alignment  - Provide patient/ family with appropriate education  Outcome: Progressing     Problem: PAIN - ADULT  Goal: Verbalizes/displays adequate comfort level or baseline comfort level  Description  Interventions:  - Encourage patient to monitor pain and request assistance  - Assess pain using appropriate pain scale  - Administer analgesics based on type and severity of pain and evaluate response  - Implement non-pharmacological measures as appropriate and evaluate response  - Consider cultural and social influences on pain and pain management  - Notify physician/advanced practitioner if interventions unsuccessful or patient reports new pain  Outcome: Progressing     Problem: INFECTION - ADULT  Goal: Absence or prevention of progression during hospitalization  Description  INTERVENTIONS:  - Assess and monitor for signs and symptoms of infection  - Monitor lab/diagnostic results  - Monitor all insertion sites, i e  indwelling lines, tubes, and drains  - Monitor endotracheal if appropriate and nasal secretions for changes in amount and color  - [place] appropriate cooling/warming therapies per order  - Administer medications as ordered  - Instruct and encourage patient and family to use good hand hygiene technique  - Identify and instruct in appropriate isolation precautions for identified infection/condition  Outcome: Progressing  Goal: Absence of fever/infection during neutropenic period  Description  INTERVENTIONS:  - Monitor WBC    Outcome: Progressing     Problem: SAFETY ADULT  Goal: Maintain or return to baseline ADL function  Description  INTERVENTIONS:  -  Assess patient's ability to carry out ADLs; assess patient's baseline for ADL function and identify physical deficits which impact ability to perform ADLs (bathing, care of mouth/teeth, toileting, grooming, dressing, etc )  - Assess/evaluate cause of self-care deficits   - Assess range of motion  - Assess patient's mobility; develop plan if impaired  - Assess patient's need for assistive devices and provide as appropriate  - Encourage maximum independence but intervene and supervise when necessary  - Involve family in performance of ADLs  - Assess for home care needs following discharge   - Consider OT consult to assist with ADL evaluation and planning for discharge  - Provide patient education as appropriate  Outcome: Progressing  Goal: Maintain or return mobility status to optimal level  Description  INTERVENTIONS:  - Assess patient's baseline mobility status (ambulation, transfers, stairs, etc )    - Identify cognitive and physical deficits and behaviors that affect mobility  - Identify mobility aids required to assist with transfers and/or ambulation (gait belt, sit-to-stand, lift, walker, cane, etc )  - [place] fall precautions as indicated by assessment  - Record patient progress and toleration of activity level on Mobility SBAR; progress patient to next Phase/Stage  - Instruct patient to call for assistance with activity based on assessment  - Consider rehabilitation consult to assist with strengthening/weightbearing, etc   Outcome: Progressing     Problem: DISCHARGE PLANNING  Goal: Discharge to home or other facility with appropriate resources  Description  INTERVENTIONS:  - Identify barriers to discharge w/patient and caregiver  - Arrange for needed discharge resources and transportation as appropriate  - Identify discharge learning needs (meds, wound care, etc )  - Arrange for interpretive services to assist at discharge as needed  - Refer to Case Management Department for coordinating discharge planning if the patient needs post-hospital services based on physician/advanced practitioner order or complex needs related to functional status, cognitive ability, or social support system  Outcome: Progressing     Problem: Knowledge Deficit  Goal: Patient/family/caregiver demonstrates understanding of disease process, treatment plan, medications, and discharge instructions  Description  Complete learning assessment and assess knowledge base    Interventions:  - Provide teaching at level of understanding  - Provide teaching via preferred learning methods  Outcome: Progressing

## 2019-11-18 NOTE — SOCIAL WORK
Received a TC from [facility] from [place] [id number] stating the pt has been denied by the insurance company for ARU  TC to pt daughter Edin Sanchez to inform of same  Pt daughter states she is going to call [employer] and file a grievance  Daughter states she will call back

## 2019-11-18 NOTE — PLAN OF CARE
Problem: PHYSICAL THERAPY ADULT  Goal: Performs mobility at highest level of function for planned discharge setting  See evaluation for individualized goals  Description  Treatment/Interventions: Functional transfer training, LE strengthening/ROM, Elevations, Therapeutic exercise, Endurance training, Bed mobility, Gait training, OT  Equipment Recommended: Walker       See flowsheet documentation for full assessment, interventions and recommendations  Outcome: Progressing  Note:   Prognosis: Good  Problem List: Decreased strength, Decreased range of motion, Decreased endurance, Impaired balance, Decreased mobility, Decreased coordination, Pain, Orthopedic restrictions  Assessment: Pt seen for PT treatment session this date with interventions consisting of gait training w/ emphasis on improving pt's ability to ambulate level surfaces x 6 feet total with mod A provided by therapist with RW and therapeutic activity consisting of training: bed mobility, supine<>sit transfers, sit<>stand transfers, static sitting tolerance at EOB for 5 minutes w/ B UE support, static standing tolerance for 2 minutes w/ B UE support, vc and tactile cues for static sitting posture faciliation, vc and tactile cues for static standing posture faciliation, stand pivot transfers towards B direction and toileting w/assist for renetta care  Pt agreeable to PT treatment session upon arrival, pt found supine in bed w/ HOB elevated, in no apparent distress, A&O x 4 and no pain at rest, 9/10 L hip pain w/activity  In comparison to previous session, pt with improvements in ambulatory balance, stand<->pivot to/from bedside,commode  Post session: pt returned BTB, all needs in reach and RN notified of session findings/recommendations Continue to recommend STR at time of d/c in order to maximize pt's functional independence and safety w/ mobility   Pt continues to be functioning below baseline level, and remains limited 2* factors listed above and including weakness, impaired balance, decreased endurance, decreased activity tolerance, pain, gait dysfunction, decline from PLOF  PT will continue to see pt while here in order to address the deficits listed above and provide interventions consistent w/ POC in effort to achieve LTGs  Recommendation: Short-term skilled PT     PT - OK to Discharge: Yes(if medically stable to STR)    See flowsheet documentation for full assessment

## 2019-11-18 NOTE — OCCUPATIONAL THERAPY NOTE
11/18/19 0946   Restrictions/Precautions   Weight Bearing Precautions Per Order Yes   LLE Weight Bearing Per Order WBAT   Other Precautions WBS; Fall Risk;Pain   General   Family/Caregiver Present daughter   Lifestyle   Reciprocal Relationships daughter reports she will probably take mother/patient home with her when appropriate   Pain Assessment   Pain Assessment   (no complaints during session)   ADL   Where Assessed Other (Comment)  (sitting in bed)   Grooming Comments pt  reported having already brushed teeth this morning   UB Bathing Assistance 5  Supervision/Setup   UB Bathing Deficit Setup;Verbal cueing; Increased time to complete   LB Bathing Assistance 3  Moderate Assistance   LB Bathing Deficit Setup;Verbal cueing;Supervision/safety; Increased time to complete;Left lower leg including foot; Other (Comment)  (Right foot)   UB Dressing Assistance 4  Minimal Assistance   UB Dressing Deficit Setup;Verbal cueing;Supervision/safety; Fasteners   UB Dressing Comments *hospital gown    LB Dressing Assistance 1  Total Assistance   LB Dressing Deficit   (patient provided with instruction in THR precautions)   LB Dressing Comments patient declined Sitting at edge of bed for participation in session    Bed Mobility   Additional Comments see PT note   Transfers   Additional Comments see PT note   Coordination   Gross Motor [facility]   Dexterity WFL   Cognition   Overall Cognitive Status WFL   Arousal/Participation Responsive; Cooperative  (required re-direction; distractible )   Following Commands Follows one step commands with increased time or repetition   Activity Tolerance   Activity Tolerance Patient limited by fatigue;Patient limited by pain  (per all treatment team reports)   Assessment   Assessment Patient participated in Skilled OT session this date with interventions consisting of ADL re training with the use of correct body mechnaics and maintaining Total Hip precautions    Patient agreeable to OT treatment session, upon arrival patient was found seated in bed (pt  Chose to remain in same position)  Patient requiring frequent re direction and verbal cues for correct technique and occasional rest periods  Patient continues to be functioning below baseline level, occupational performance remains limited secondary to factors listed above and increased risk for falls and injury  From OT standpoint, recommendation at time of d/c would be Short Term Rehab  Patient to benefit from continued Occupational Therapy treatment while in the hospital to address deficits as defined above and maximize level of functional independence with ADLs and functional mobility      Plan   Treatment Interventions ADL retraining;Patient/family training   Goal Expiration Date 11/26/19   OT Treatment Day 2   ANDREW Fishman/REYNA

## 2019-11-18 NOTE — ASSESSMENT & PLAN NOTE
· This was the initial cause of the hip fracture This was a simple mechanical fall - patient lost her balance while climbing to get something off the top shelf at [facility]

## 2019-11-19 PROBLEM — T81.19XA POSTOPERATIVE HYPOVOLEMIC SHOCK: Status: ACTIVE | Noted: 2019-11-19

## 2019-11-19 PROBLEM — I10 BENIGN ESSENTIAL HTN: Chronic | Status: ACTIVE | Noted: 2019-11-14

## 2019-11-19 PROCEDURE — 97530 THERAPEUTIC ACTIVITIES: CPT

## 2019-11-19 PROCEDURE — 97535 SELF CARE MNGMENT TRAINING: CPT

## 2019-11-19 PROCEDURE — 99232 SBSQ HOSP IP/OBS MODERATE 35: CPT | Performed by: PHYSICIAN ASSISTANT

## 2019-11-19 RX ORDER — HYDRALAZINE HYDROCHLORIDE 20 MG/ML
10 INJECTION INTRAMUSCULAR; INTRAVENOUS EVERY 6 HOURS PRN
Status: DISCONTINUED | OUTPATIENT
Start: 2019-11-19 | End: 2019-11-20 | Stop reason: HOSPADM

## 2019-11-19 RX ORDER — OXYCODONE HYDROCHLORIDE 5 MG/1
5 TABLET ORAL EVERY 6 HOURS PRN
Status: DISCONTINUED | OUTPATIENT
Start: 2019-11-19 | End: 2019-11-20 | Stop reason: HOSPADM

## 2019-11-19 RX ADMIN — GABAPENTIN 100 MG: 100 CAPSULE ORAL at 09:25

## 2019-11-19 RX ADMIN — HYDRALAZINE HYDROCHLORIDE 10 MG: 20 INJECTION INTRAMUSCULAR; INTRAVENOUS at 02:33

## 2019-11-19 RX ADMIN — ACETAMINOPHEN 650 MG: 325 TABLET ORAL at 02:30

## 2019-11-19 RX ADMIN — SENNOSIDES AND DOCUSATE SODIUM 2 TABLET: 8.6; 5 TABLET ORAL at 21:35

## 2019-11-19 RX ADMIN — GABAPENTIN 100 MG: 100 CAPSULE ORAL at 21:35

## 2019-11-19 RX ADMIN — OXYCODONE HYDROCHLORIDE 5 MG: 5 TABLET ORAL at 13:34

## 2019-11-19 RX ADMIN — ENOXAPARIN SODIUM 40 MG: 40 INJECTION SUBCUTANEOUS at 09:25

## 2019-11-19 RX ADMIN — GABAPENTIN 100 MG: 100 CAPSULE ORAL at 17:03

## 2019-11-19 RX ADMIN — LISINOPRIL 10 MG: 10 TABLET ORAL at 09:25

## 2019-11-19 RX ADMIN — ACETAMINOPHEN 650 MG: 325 TABLET ORAL at 21:35

## 2019-11-19 RX ADMIN — ACETAMINOPHEN 650 MG: 325 TABLET ORAL at 11:45

## 2019-11-19 RX ADMIN — HYDROCODONE BITARTRATE AND ACETAMINOPHEN 1 TABLET: 5; 325 TABLET ORAL at 09:26

## 2019-11-19 RX ADMIN — POLYETHYLENE GLYCOL 3350 17 G: 17 POWDER, FOR SOLUTION ORAL at 09:25

## 2019-11-19 NOTE — SOCIAL WORK
Pt was accepted to [facility], I received their npi # and information needed for an authorization to be started I spoke with Koffi Rodriguez  And she made me aware the doctor accepting the pt is not a participating doctor, pt has a ppo plan she is able to use this doctor but will  Need to pay more, I will call the snf and ask if they can find an accepting doctor that is in network, the pending ref# is [id number] I was told that the nurse will call to get the needed clinical

## 2019-11-19 NOTE — ASSESSMENT & PLAN NOTE
· Patient is status post an ORIF of the right hip on 11/15/2019  · Postop day 4  · All management, including pain and DVT prophylaxis is per Orthopedics

## 2019-11-19 NOTE — ASSESSMENT & PLAN NOTE
· Resolved  · Status post Levophed therapy in the postop period  · Was downgraded from the ICU on 11/16/2019

## 2019-11-19 NOTE — SOCIAL WORK
I placed a call to Marysol Faulkner at 09:00 am to # [phone] and left a message for her to call me so I can help with the appeal process that ora engel started ,s he wants only aru for this pt and [employer] denied, I then called Alen Landaverde the daughter-shirley at 09:10 am to # [phone] she stated she is coming here this morning and she will call the daughter's cell # and when she comes today she will give the # to me, she was unable to get the # from the phone while she was speaking with me,

## 2019-11-19 NOTE — PLAN OF CARE
Problem: PHYSICAL THERAPY ADULT  Goal: Performs mobility at highest level of function for planned discharge setting  See evaluation for individualized goals  Description  Treatment/Interventions: Functional transfer training, LE strengthening/ROM, Elevations, Therapeutic exercise, Endurance training, Bed mobility, Gait training, OT  Equipment Recommended: Walker       See flowsheet documentation for full assessment, interventions and recommendations  Outcome: Progressing  Note:   Prognosis: Good  Problem List: Decreased strength, Decreased endurance, Impaired balance, Decreased mobility, Decreased coordination, Orthopedic restrictions, Pain  Assessment: Pt  found resting in bed with call bell lit  Pt answered call light and pt  stated that she felt as though she needed to use the bed side commode  Pt  performed bed mobility tasks including rolling to her R side and then to her L side with min Ax1 requiring assistance with her LE's and the use of BSR's to get over  Pt  then transfered from supine to sit with mod Ax1 for LLE management and use of L BSR to get to the EOB  Pt  required a brief rest period and then transfered from sit to stand with mod Ax1 and a RW  Pt  performed a SPS onto elevated bed side commode where she was cued for safety and direction  Pt  was unsuccessful with  BM after trying for a few mins  Pt  then transfered from sit to stand with mod Ax1 and RW and pt  was I with frontal pericare  Pt  then performed a SPS back into the bed per her request with mod Ax1 and RW   Pt  then required mod Ax1 to get from sit to supine and positioned properly for comfort  Pt  was then reconnected to the SCD's and left with all needs in reach  Bed side commode was cleaned and replaced  Pt  tolerated treatment well but did report pain level 7/10 in her L hip throughout treatment  Pt  would benefit from short term rehab to continue to improve her functional mobility    Continue current POC and progress as tolerated  Recommendation: Short-term skilled PT     PT - OK to Discharge: Yes    See flowsheet documentation for full assessment     Suleiman Thomas PTA

## 2019-11-19 NOTE — PROGRESS NOTES
Progress Note - Jessica Moore 1940, 70 y o  female MRN: [id number]    Unit/Bed#: -02 Encounter: [id number]    Primary Care Provider: Marie Richardson DO   Date and time admitted to hospital: 11/14/2019  1:19 PM        * Closed left hip fracture [facility])  Assessment & Plan  · Patient is status post an ORIF of the right hip on 11/15/2019  · Postop day 4  · All management, including pain and DVT prophylaxis is per Orthopedics    Acute blood loss anemia  Assessment & Plan  · Secondary to an acute blood loss anemia with a component of a dilutional effect from the IV fluids that the patient was receiving  · Initial hemoglobin was 12 0 - when the hemoglobin dropped to 8 she received 2 units of blood that brought her hemoglobin back up to 10 4, hemoglobin is back down to 7 6, will transfuse 2 units of PRBCs today   · Hemoglobin currently 9 7 on 11/18  · No active bleeding      Postoperative hypotension  Assessment & Plan  · Resolved  · Status post Levophed therapy in the postop period  · Was downgraded from the ICU on 11/16/2019    Postoperative hypovolemic shock  Assessment & Plan  · Resolved, due to fracture of the hip and blood losses evidenced by hypotension(86/39, 75/54), tachycardia (111-114), Hbg drop (12 4 to 8 0), treated with transfusion of PRBCs, Levophed, NSS bolus, continuous LR, ICU admission, hemodynamic monitoring, and lab monitoring  Low grade fever  Assessment & Plan  · Secondary to suspected atelectasis  · WBCs are normal    Benign essential HTN  Assessment & Plan  · Home medications resumed  · Continue p r n  Hydralazine    VTE Pharmacologic Prophylaxis: Pharmacologic: Enoxaparin (Lovenox)    Patient Centered Rounds: I have performed bedside rounds with nursing staff today      Discussions with Specialists or Other Care Team Provider:  Case management, care coordination team  Education and Discussions with Family / Patient:  Patient    Current Length of Stay: 5 day(s)    Current Patient Status: Inpatient   Certification Statement: The patient will continue to require additional inpatient hospital stay due to Discharge planning in process, family would like or [facility] skilled [facility] facility    Discharge Plan:  When discharge Plan  available with case management    Code Status: Level 1 - Full Code    Subjective:   Patient seen in bed with her daughter at bedside  She reports a bowel movement yesterday with a suppository  She has no complaints of chest pain or shortness breath  She does report pain in her left hip  Daughter's frustrated that the patient is not able to attend acute rehab because she is not able to handle 3 hours a day in her opinion  She would like her mom to go to [facility] and [facility] as a 2nd choice    Objective:     Vitals:   Temp (24hrs), Av 9 °F (37 7 °C), Min:99 1 °F (37 3 °C), Max:101 °F (38 3 °C)    Temp:  [99 1 °F (37 3 °C)-101 °F (38 3 °C)] 99 7 °F (37 6 °C)  HR:  [69-82] 75  Resp:  [17-18] 18  BP: (152-194)/(69-82) 164/75  SpO2:  [96 %] 96 %  Body mass index is 24 14 kg/m²  Input and Output Summary (last 24 hours): Intake/Output Summary (Last 24 hours) at 2019 1319  Last data filed at 2019 0030  Gross per 24 hour   Intake 260 ml   Output 400 ml   Net -140 ml       Physical Exam:   Physical Exam   Constitutional: She is oriented to person, place, and time  She appears well-developed and well-nourished  HENT:   Head: Normocephalic and atraumatic  Eyes: Conjunctivae and EOM are normal  Right eye exhibits no discharge  Left eye exhibits no discharge  Neck: Normal range of motion  No tracheal deviation present  Cardiovascular: Normal rate, regular rhythm and normal heart sounds  Exam reveals no gallop and no friction rub  No murmur heard  Pulmonary/Chest: Effort normal and breath sounds normal  No respiratory distress  She has no wheezes  She has no rales  Abdominal: Soft   Bowel sounds are normal  She exhibits no distension and no mass  There is no tenderness  There is no guarding  Musculoskeletal: Normal range of motion  She exhibits no edema, tenderness or deformity  Left hip with dressing in place positive serosanguineous drainage noted on the dressing  Lower extremities in vena dynes   Neurological: She is alert and oriented to person, place, and time  Skin: Skin is warm and dry  No rash noted  No erythema  No pallor  Psychiatric: She has a normal mood and affect  Her behavior is normal  Judgment and thought content normal    Nursing note and vitals reviewed  Additional Data:   Labs:    Results from last 7 days   Lab Units 11/18/19  0540   WBC Thousand/uL 8 40   HEMOGLOBIN g/dL 9 7*   HEMATOCRIT % 28 5*   PLATELETS Thousands/uL 118*   NEUTROS PCT % 67   LYMPHS PCT % 19*   MONOS PCT % 11   EOS PCT % 3     Results from last 7 days   Lab Units 11/18/19  0540   POTASSIUM mmol/L 3 9   CHLORIDE mmol/L 108*   CO2 mmol/L 25   BUN mg/dL 11   CREATININE mg/dL 0 73   CALCIUM mg/dL 7 8*     * I Have Reviewed All Lab Data Listed Above  * Additional Pertinent Lab Tests Reviewed:  Clarissa Travis Admission  Reviewed    Last 24 Hours Medication List:     Current Facility-Administered Medications:  acetaminophen 650 mg Oral Q6H PRN Nino Calvin MD   acetaminophen 650 mg Oral Q8H [address] WAGNER Ferris   Apoaequorin 1 capsule Oral Daily WAGNER Chapman   bisacodyl 10 mg Rectal Daily PRN Link Navarro PA-C   enoxaparin 40 mg Subcutaneous Q24H 2020 Robel Villarreal MD   gabapentin 100 mg Oral TID Nino Calvin MD   hydrALAZINE 10 mg Intravenous Q6H PRN WAGNER Ferris   lisinopril 10 mg Oral Daily Link Navarro PA-C   morphine injection 2 mg Intravenous Q4H PRN Nino Calvin MD   ondansetron 4 mg Intravenous Q6H PRN Nino Calvin MD   oxyCODONE 5 mg Oral Q6H PRN Link Navarro PA-C   polyethylene glycol 17 g Oral Daily Vale Muhammad PA-C   senna-docusate sodium 2 tablet Oral HS Beatris Cross PA-C   temazepam 15 mg Oral HS PRN Beatris Cross PA-C        Today, Patient Was Seen By: Vale Muhammad PA-C    ** Please Note: Dictation voice to text software may have been used in the creation of this document   **

## 2019-11-19 NOTE — SOCIAL WORK
I met with the pt and Julio Cai ( daughter in-law) they have both decides to stop the appeal for aru, she feels she may not be able to do 3hrs and she deos not want to delay her rehab, they stated they would like [address] [place] [address] for snf rehab, I called and spoke with Nicolle Wan, they do have female beds and they do accept [facility], referral was sent as requested , when a bed has been received then an Artice Rummer can be started, cm will continue to follow,

## 2019-11-19 NOTE — ASSESSMENT & PLAN NOTE
· Secondary to an acute blood loss anemia with a component of a dilutional effect from the IV fluids that the patient was receiving  · Initial hemoglobin was 12 0 - when the hemoglobin dropped to 8 she received 2 units of blood that brought her hemoglobin back up to 10 4, hemoglobin is back down to 7 6, will transfuse 2 units of PRBCs today   · Hemoglobin currently 9 7 on 11/18  · No active bleeding

## 2019-11-19 NOTE — PLAN OF CARE
Problem: OCCUPATIONAL THERAPY ADULT  Goal: Performs self-care activities at highest level of function for planned discharge setting  See evaluation for individualized goals  Description  Treatment Interventions: ADL retraining, Functional transfer training, Endurance training, Neuromuscular reeducation, Energy conservation, Equipment evaluation/education    See flowsheet documentation for full assessment, interventions and recommendations  Outcome: Progressing  Note:   Limitation: Decreased ADL status, Decreased Safe judgement during ADL, Decreased endurance, Decreased self-care trans, Decreased high-level ADLs  Prognosis: Good  Assessment: Patient participated in Skilled OT session this date with interventions consisting of ADL re training with the use of correct body mechnaics, safety awareness and fall prevention techniques and  therapeutic activities to: increase activity tolerance   Patient agreeable to OT treatment session, upon arrival patient was found seated OOB to Chair  In comparison to previous session, patient with improvements in tolerance for sitting OOB, and self-care accomplishment  Patient requiring verbal cues for safety, verbal cues for correct technique and frequent rest periods  Patient continues to be functioning below baseline level, occupational performance remains limited secondary to factors listed above and increased risk for falls and injury  From OT standpoint, recommendation at time of d/c would be Short Term Rehab  Patient to benefit from continued Occupational Therapy treatment while in the hospital to address deficits as defined above and maximize level of functional independence with ADLs and functional mobility        OT Discharge Recommendation: Short Term Rehab  OT - OK to Discharge: Yes(Once medically cleared)  GILDARDO Morgan

## 2019-11-19 NOTE — PHYSICAL THERAPY NOTE
11/19/19 0912   Pain Assessment   Pain Assessment 0-10   Pain Score 7   Pain Type Surgical pain   Pain Location Hip   Pain Orientation Left   Pain Descriptors Aching   Pain Frequency Constant/continuous   Pain Onset Ongoing   Clinical Progression Not changed   Patient's Stated Pain Goal No pain   Hospital Pain Intervention(s) Ambulation/increased activity;Repositioned   Restrictions/Precautions   Weight Bearing Precautions Per Order Yes   LLE Weight Bearing Per Order WBAT   Other Precautions WBS; Fall Risk;Pain   General   Chart Reviewed Yes   Response to Previous Treatment Patient with no complaints from previous session  Family/Caregiver Present No   Cognition   Overall Cognitive Status WFL   Arousal/Participation Alert; Cooperative   Attention Within functional limits   Orientation Level Oriented X4   Memory Within functional limits   Following Commands Follows one step commands without difficulty   Subjective   Subjective "I need to try to go to the toilet "   Bed Mobility   Rolling R 4  Minimal assistance   Additional items Assist x 1;HOB elevated; Bedrails;Leg ;LE management   Rolling L 4  Minimal assistance   Additional items Assist x 1;HOB elevated; Bedrails; Increased time required;LE management;Verbal cues   Supine to Sit 3  Moderate assistance   Additional items Assist x 1;HOB elevated; Bedrails;Verbal cues; Increased time required;LE management   Sit to Supine 3  Moderate assistance   Additional items Assist x 1;HOB elevated; Bedrails; Increased time required;Verbal cues;LE management   Transfers   Sit to Stand 3  Moderate assistance   Additional items Assist x 1;Bedrails; Increased time required;Verbal cues   Stand to Sit 3  Moderate assistance   Additional items Assist x 1; Armrests; Increased time required;Verbal cues   Stand pivot 3  Moderate assistance   Additional items Assist x 1; Armrests; Increased time required;Verbal cues   Toilet transfer 3  Moderate assistance   Additional items Assist x 1;Armrests; Increased time required;Verbal cues; Commode;Raised toilet seat   Balance   Static Sitting Good   Dynamic Sitting Fair +   Static Standing Fair   Dynamic Standing Fair -   Ambulatory Fair -   Endurance Deficit   Endurance Deficit Yes   Activity Tolerance   Activity Tolerance Patient limited by pain   Assessment   Prognosis Good   Problem List Decreased strength;Decreased endurance; Impaired balance;Decreased mobility; Decreased coordination;Orthopedic restrictions;Pain   Assessment Pt  found resting in bed with call bell lit  Pt answered call light and pt  stated that she felt as though she needed to use the bed side commode  Pt  performed bed mobility tasks including rolling to her R side and then to her L side with min Ax1 requiring assistance with her LE's and the use of BSR's to get over  Pt  then transfered from supine to sit with mod Ax1 for LLE management and use of L BSR to get to the EOB  Pt  required a brief rest period and then transfered from sit to stand with mod Ax1 and a RW  Pt  performed a SPS onto elevated bed side commode where she was cued for safety and direction  Pt  was unsuccessful with  BM after trying for a few mins  Pt  then transfered from sit to stand with mod Ax1 and RW and pt  was I with frontal pericare  Pt  then performed a SPS back into the bed per her request with mod Ax1 and RW   Pt  then required mod Ax1 to get from sit to supine and positioned properly for comfort  Pt  was then reconnected to [facility] and left with all needs in reach  Bed side commode was cleaned and replaced  Pt  tolerated treatment well but did report pain level 7/10 in her L hip throughout treatment  Pt  would benefit from short term rehab to continue to improve her functional mobility  Continue current POC and progress as tolerated  Goals   Patient Goals to get home   PT Treatment Day 4   Plan   Treatment/Interventions Functional transfer training;LE strengthening/ROM; Endurance training; Therapeutic exercise;Patient/family training;Bed mobility;Gait training   Progress Progressing toward goals   PT Frequency 7x/wk   Recommendation   Recommendation Short-term skilled PT   Equipment Recommended Walker   PT - OK to Discharge Yes   Additional Comments when medically stable   Caleb Negron, PTA

## 2019-11-19 NOTE — OCCUPATIONAL THERAPY NOTE
11/19/19 1300   Restrictions/Precautions   Weight Bearing Precautions Per Order Yes   LLE Weight Bearing Per Order WBAT   Other Precautions WBS; Fall Risk   General   Family/Caregiver Present one daughter left at beginning of session and another daughter arrived as session was ending    Pain Assessment   Pain Assessment   (had occasional pain with some motion and positioning )   ADL   Where Assessed Chair   Grooming Comments pt  managed dentures with set-up   UB Bathing Assistance 5  Supervision/Setup   UB Bathing Deficit Setup;Verbal cueing;Supervision/safety; Increased time to complete   LB Bathing Assistance 3  Moderate Assistance   LB Bathing Deficit Setup;Verbal cueing; Increased time to complete; Buttocks;Right lower leg including foot; Left lower leg including foot   UB Dressing Assistance 4  Minimal Assistance   UB Dressing Deficit Setup; Fasteners   UB Dressing Comments hospital gown   LB Dressing Comments dependent at this time for management of socks   Toileting Assistance  2  Maximal Assistance   Toileting Deficit Setup; Bedside commode   Toileting Comments RE: transfer   (indep  for bowel hygiene, while in sit)   Bed Mobility   Sit to Supine 2  Maximal assistance   Additional items Assist x 1;HOB elevated; Increased time required;Verbal cues;LE management   Transfers   Sit to Stand 3  Moderate assistance   Additional items Assist x 2;Armrests; Increased time required;Verbal cues   Stand to Sit 3  Moderate assistance   Additional items Assist x 2;Armrests; Increased time required;Verbal cues   Stand pivot 3  Moderate assistance   Additional items Assist x 2;Armrests; Increased time required;Verbal cues   Toilet Transfers   Toilet Transfer to Extra wide bedside commode   Toilet Transfer Technique Stand pivot   Toilet Transfers Maximal assistance   Toilet Transfers Comments patient complained of being unable to move even RLE while in stand    Coordination   Gross Motor [facility]   Dexterity [facility]   Cognition   Overall Cognitive Status WFL   Arousal/Participation Alert; Cooperative   Activity Tolerance   Activity Tolerance Patient limited by fatigue;Patient limited by pain   Assessment   Assessment Patient participated in Skilled OT session this date with interventions consisting of ADL re training with the use of correct body mechnaics, safety awareness and fall prevention techniques and  therapeutic activities to: increase activity tolerance   Patient agreeable to OT treatment session, upon arrival patient was found seated OOB to Chair  In comparison to previous session, patient with improvements in tolerance for sitting OOB, and self-care accomplishment  Patient requiring verbal cues for safety, verbal cues for correct technique and frequent rest periods  Patient continues to be functioning below baseline level, occupational performance remains limited secondary to factors listed above and increased risk for falls and injury  From OT standpoint, recommendation at time of d/c would be Short Term Rehab  Patient to benefit from continued Occupational Therapy treatment while in the hospital to address deficits as defined above and maximize level of functional independence with ADLs and functional mobility      Plan   Treatment Interventions ADL retraining;Functional transfer training   Goal Expiration Date 11/26/19   OT Treatment Day 81 [address], MORALES/L

## 2019-11-19 NOTE — ASSESSMENT & PLAN NOTE
· Resolved, due to fracture of the hip and blood losses evidenced by hypotension(86/39, 75/54), tachycardia (111-114), Hbg drop (12 4 to 8 0), treated with transfusion of PRBCs, Levophed, NSS bolus, continuous LR, ICU admission, hemodynamic monitoring, and lab monitoring

## 2019-11-20 VITALS
RESPIRATION RATE: 18 BRPM | DIASTOLIC BLOOD PRESSURE: 66 MMHG | OXYGEN SATURATION: 98 % | BODY MASS INDEX: 24.01 KG/M2 | TEMPERATURE: 99.2 F | HEIGHT: 64 IN | WEIGHT: 140.65 LBS | SYSTOLIC BLOOD PRESSURE: 145 MMHG | HEART RATE: 69 BPM

## 2019-11-20 LAB
ANION GAP SERPL CALCULATED.3IONS-SCNC: 6 MMOL/L (ref 4–13)
BUN SERPL-MCNC: 12 MG/DL (ref 7–25)
CALCIUM SERPL-MCNC: 8.4 MG/DL (ref 8.6–10.5)
CHLORIDE SERPL-SCNC: 104 MMOL/L (ref 98–107)
CO2 SERPL-SCNC: 28 MMOL/L (ref 21–31)
CREAT SERPL-MCNC: 0.7 MG/DL (ref 0.6–1.2)
ERYTHROCYTE [DISTWIDTH] IN BLOOD BY AUTOMATED COUNT: 16.8 % (ref 11.5–14.5)
GFR SERPL CREATININE-BSD FRML MDRD: 84 ML/MIN/1.73SQ M
GLUCOSE SERPL-MCNC: 129 MG/DL (ref 65–99)
HCT VFR BLD AUTO: 31.6 % (ref 42–47)
HGB BLD-MCNC: 10.6 G/DL (ref 12–16)
MCH RBC QN AUTO: 28.8 PG (ref 26–34)
MCHC RBC AUTO-ENTMCNC: 33.6 G/DL (ref 31–37)
MCV RBC AUTO: 86 FL (ref 81–99)
PLATELET # BLD AUTO: 220 THOUSANDS/UL (ref 149–390)
PMV BLD AUTO: 7.8 FL (ref 8.6–11.7)
POTASSIUM SERPL-SCNC: 3.7 MMOL/L (ref 3.5–5.5)
RBC # BLD AUTO: 3.69 MILLION/UL (ref 3.9–5.2)
SODIUM SERPL-SCNC: 138 MMOL/L (ref 134–143)
WBC # BLD AUTO: 8 THOUSAND/UL (ref 4.8–10.8)

## 2019-11-20 PROCEDURE — 97110 THERAPEUTIC EXERCISES: CPT

## 2019-11-20 PROCEDURE — 85027 COMPLETE CBC AUTOMATED: CPT | Performed by: PHYSICIAN ASSISTANT

## 2019-11-20 PROCEDURE — 80048 BASIC METABOLIC PNL TOTAL CA: CPT | Performed by: PHYSICIAN ASSISTANT

## 2019-11-20 PROCEDURE — 97530 THERAPEUTIC ACTIVITIES: CPT

## 2019-11-20 PROCEDURE — 99239 HOSP IP/OBS DSCHRG MGMT >30: CPT | Performed by: PHYSICIAN ASSISTANT

## 2019-11-20 RX ORDER — ACETAMINOPHEN 325 MG/1
650 TABLET ORAL EVERY 6 HOURS PRN
Qty: 30 TABLET | Refills: 0
Start: 2019-11-20 | End: 2021-11-07

## 2019-11-20 RX ORDER — POLYETHYLENE GLYCOL 3350 17 G/17G
17 POWDER, FOR SOLUTION ORAL DAILY
Qty: 14 EACH | Refills: 0
Start: 2019-11-20 | End: 2021-11-07

## 2019-11-20 RX ORDER — BENAZEPRIL HYDROCHLORIDE 10 MG/1
10 TABLET ORAL DAILY
Refills: 0
Start: 2019-11-20

## 2019-11-20 RX ORDER — ACETAMINOPHEN 325 MG/1
650 TABLET ORAL EVERY 8 HOURS SCHEDULED
Qty: 30 TABLET | Refills: 0
Start: 2019-11-20 | End: 2021-11-07

## 2019-11-20 RX ORDER — OXYCODONE HYDROCHLORIDE 5 MG/1
5 TABLET ORAL EVERY 6 HOURS PRN
Qty: 30 TABLET | Refills: 0
Start: 2019-11-20 | End: 2019-11-30

## 2019-11-20 RX ORDER — GABAPENTIN 100 MG/1
100 CAPSULE ORAL 3 TIMES DAILY
Refills: 0
Start: 2019-11-20 | End: 2021-11-07

## 2019-11-20 RX ORDER — TEMAZEPAM 30 MG/1
30 CAPSULE ORAL
Qty: 30 CAPSULE | Refills: 0
Start: 2019-11-20 | End: 2019-12-27

## 2019-11-20 RX ORDER — BISACODYL 10 MG
10 SUPPOSITORY, RECTAL RECTAL DAILY PRN
Qty: 12 SUPPOSITORY | Refills: 0
Start: 2019-11-20

## 2019-11-20 RX ORDER — AMOXICILLIN 250 MG
2 CAPSULE ORAL
Refills: 0
Start: 2019-11-20

## 2019-11-20 RX ADMIN — OXYCODONE HYDROCHLORIDE 5 MG: 5 TABLET ORAL at 00:00

## 2019-11-20 RX ADMIN — OXYCODONE HYDROCHLORIDE 5 MG: 5 TABLET ORAL at 12:03

## 2019-11-20 RX ADMIN — POLYETHYLENE GLYCOL 3350 17 G: 17 POWDER, FOR SOLUTION ORAL at 09:46

## 2019-11-20 RX ADMIN — LISINOPRIL 10 MG: 10 TABLET ORAL at 09:28

## 2019-11-20 RX ADMIN — ENOXAPARIN SODIUM 40 MG: 40 INJECTION SUBCUTANEOUS at 09:26

## 2019-11-20 RX ADMIN — OXYCODONE HYDROCHLORIDE 5 MG: 5 TABLET ORAL at 06:03

## 2019-11-20 RX ADMIN — GABAPENTIN 100 MG: 100 CAPSULE ORAL at 09:28

## 2019-11-20 NOTE — PLAN OF CARE
Problem: PHYSICAL THERAPY ADULT  Goal: Performs mobility at highest level of function for planned discharge setting  See evaluation for individualized goals  Description  Treatment/Interventions: Functional transfer training, LE strengthening/ROM, Elevations, Therapeutic exercise, Endurance training, Bed mobility, Gait training, OT  Equipment Recommended: Walker       See flowsheet documentation for full assessment, interventions and recommendations  Outcome: Progressing  Note:   Prognosis: Good  Problem List: Decreased strength, Decreased range of motion, Decreased endurance, Impaired balance, Decreased mobility, Decreased safety awareness, Orthopedic restrictions, Pain  Assessment: Pt seen for PT treatment session this date with interventions consisting of Therapeutic exercise consisting of: AROM and AAROM 10 reps B LE and L LE in supine position, therapeutic activity consisting of training: bed mobility, supine<>sit transfers, sit<>stand transfers and stand pivot transfers towards both direction and standing activity for assistance with pericare  Pt agreeable to PT treatment session upon arrival, pt found supine in bed w/ HOB elevated, in no apparent distress  In comparison to previous session, pt with no improvements as evidenced by requiring the same amount of assistance with transfers  Post session: pt returned BTB and all needs in reach Continue to recommend STR at time of d/c in order to maximize pt's functional independence and safety w/ mobility  Pt continues to be functioning below baseline level, and remains limited 2* factors listed above and including decreased functional mobility, and decreased activity tolerance  PT will continue to see pt while here in order to address the deficits listed above and provide interventions consistent w/ POC in effort to achieve STGs  Recommendation: Short-term skilled PT     PT - OK to Discharge: Yes    See flowsheet documentation for full assessment

## 2019-11-20 NOTE — DISCHARGE SUMMARY
Discharge- Jonatan Collier 1940, 70 y o  female MRN: [id number]    Unit/Bed#: -02 Encounter: [id number]    Primary Care Provider: Emre Spears DO   Date and time admitted to hospital: 11/14/2019  1:19 PM        * Closed left hip fracture [facility])  Assessment & Plan  · Patient is status post an ORIF of the right hip on 11/15/2019  · Postop day 5  · All management, including pain and DVT prophylaxis is per Orthopedics    Acute blood loss anemia  Assessment & Plan  · Secondary to an acute blood loss anemia with a component of a dilutional effect from the IV fluids that the patient was receiving  · Initial hemoglobin was 12 0 - when the hemoglobin dropped to 8 she received 2 units of blood that brought her hemoglobin back up to 10 4, hemoglobin is back down to 7 6, will transfuse 2 units of PRBCs today   · Hemoglobin currently 9 7 on 11/18 and 10 6 11/20  · No active bleeding      Postoperative hypotension  Assessment & Plan  · Resolved  · Status post Levophed therapy in the postop period  · Was downgraded from the ICU on 11/16/2019    Postoperative hypovolemic shock  Assessment & Plan  · Resolved, due to fracture of the hip and blood losses evidenced by hypotension(86/39, 75/54), tachycardia (111-114), Hbg drop (12 4 to 8 0), treated with transfusion of PRBCs, Levophed, NSS bolus, continuous LR, ICU admission, hemodynamic monitoring, and lab monitoring     · Resolved      Low grade fever  Assessment & Plan  · Secondary to suspected atelectasis  · WBCs are normal      Discharging Physician / Practitioner: Batsheva Arnold PA-C  PCP: Emre Spears DO  Admission Date:   Admission Orders (From admission, onward)     Ordered        11/14/19 1744  Inpatient Admission  Once         11/14/19 1646  Inpatient Admission  Once         11/14/19 1633  Inpatient Admission  Once,   Status:  Canceled                   Discharge Date: 11/20/19    Resolved Problems  Date Reviewed: 11/20/2019    None Consultations During Hospital Stay:  · Orthopedics regarding hip fracture and performed ORIF    Procedures Performed:   · X-ray left knee showing limited evaluation secondary to positioning no acute osseous abnormality  · X-ray of the foot showing no lytic or blastic lesions  Soft tissue unremarkable  · X-ray left hip showing comminuted impacted left intratrochanteric femoral neck fracture  · CT left hip showing comminuted impacted fracture of the intertrochanteric left femoral neck with apex anterior and lateral angulation    Significant Findings / Test Results:   · None    Incidental Findings:   · None     Test Results Pending at Discharge (will require follow up): · None     Outpatient Tests Requested:  · None    Complications:     Postoperative hypovolemia and hypotension requiring brief stay in the ICU with Levophed and blood transfusion    Reason for Admission:  Left hip pain post fall    Hospital Course:     Castillo Jackson is a 70 y o  female patient who originally presented to the hospital on 11/14/2019 due to left hip pain post fall  Patient with past medical history of hypertension presented to the emergency room after a fall while out at [employer]  She was found to have a left comminuted intertrochanteric fracture of the femoral neck  She was evaluated by Orthopedics and taken to the OR on 11/15/2019  Patient had postoperative hypotension and hypovolemia as she was requiring a short stay in the ICU was placed on Levophed drip and also required 2 units packed red blood cells  She was transferred out to [facility]  She was evaluated by Physical and Occupational therapy and at this time she will be going to skilled nursing facility for short-term rehab  She should continue DVT prophylaxis for 28 days or as directed by her orthopedic surgeon  Blood levels are stable at discharge  Please see above list of diagnoses and related plan for additional information       Condition at Discharge: stable     Discharge Day Visit / Exam:     Subjective:  Patient seen in bed she reports feels a little lightheaded earlier  May be related to pain medicine  Blood pressure when I checked it was 160/82  Hemoglobin is 10 6  She has not had a bowel movement since the suppository  She has no complaints of chest pain or shortness of breath  She does report pain in the hip  Vitals: Blood Pressure: 145/66 (11/20/19 0728)  Pulse: 69 (11/20/19 0728)  Temperature: 99 2 °F (37 3 °C) (11/20/19 0728)  Temp Source: Temporal (11/20/19 0728)  Respirations: 18 (11/20/19 0728)  Height: 5' 4" (162 6 cm)(Stated ) (11/14/19 1855)  Weight - Scale: 63 8 kg (140 lb 10 5 oz) (11/14/19 1855)  SpO2: 98 % (11/20/19 0728)  Exam:   Physical Exam   Constitutional: She is oriented to person, place, and time  She appears well-developed and well-nourished  HENT:   Head: Normocephalic and atraumatic  Eyes: Conjunctivae and EOM are normal  Right eye exhibits no discharge  Left eye exhibits no discharge  Neck: Normal range of motion  No tracheal deviation present  Cardiovascular: Normal rate, regular rhythm and normal heart sounds  Exam reveals no gallop and no friction rub  No murmur heard  Pulmonary/Chest: Effort normal and breath sounds normal  No respiratory distress  She has no wheezes  She has no rales  Abdominal: Soft  Bowel sounds are normal  She exhibits no distension and no mass  There is no tenderness  There is no guarding  Musculoskeletal: She exhibits tenderness  She exhibits no edema or deformity  Left hip dressing in place with some serosanguineous drainage  Positive ecchymosis of her left femur   Neurological: She is alert and oriented to person, place, and time  Skin: Skin is warm and dry  No rash noted  No erythema  No pallor  Psychiatric: She has a normal mood and affect  Her behavior is normal  Judgment and thought content normal    Nursing note and vitals reviewed        Discussion with patient, case management, nursing    Discharge instructions/Information to patient and family:   See after visit summary for information provided to patient and family  Provisions for Follow-Up Care:  See after visit summary for information related to follow-up care and any pertinent home health orders  Disposition:     Other [facility]    Planned Readmission:    None     Discharge Statement:  I spent 35 minutes discharging the patient  This time was spent on the day of discharge  I had direct contact with the patient on the day of discharge  Greater than 50% of the total time was spent examining patient, answering all patient questions, arranging and discussing plan of care with patient as well as directly providing post-discharge instructions  Additional time then spent on discharge activities  Discharge Medications:  See after visit summary for reconciled discharge medications provided to patient and family        ** Please Note: This note has been constructed using a voice recognition system **

## 2019-11-20 NOTE — ASSESSMENT & PLAN NOTE
· Secondary to an acute blood loss anemia with a component of a dilutional effect from the IV fluids that the patient was receiving  · Initial hemoglobin was 12 0 - when the hemoglobin dropped to 8 she received 2 units of blood that brought her hemoglobin back up to 10 4, hemoglobin is back down to 7 6, will transfuse 2 units of PRBCs today   · Hemoglobin currently 9 7 on 11/18 and 10 6 11/20  · No active bleeding

## 2019-11-20 NOTE — NURSING NOTE
ADMIITED FROM ED, AFTER FALLING IN [employer], AND FX LEFT HIP  PLACED INTO BED WITH SEVERAL NURSES ASSISTING  LEFT LEG IS EXTERNALLY ROTATED  POSITIVE PEDALS AND ADEQ CAP REFILL NOTED  IV IS MAINTAINED TO RT AC, AND IVF ABSORBING NSS 75/HR  SCDS APPLIED B/L  CALL STONE GIVEN  YESSY BLAIR HERE 
Assumed care at 1900  Pt finished up second unit of prbcs and repeat h and h was stable  Pt was weaned off levo gtt bp is stable at this time  Pt rates left hip pain a 3 she refuses ice pack and doesn't want to turn at this time  Will cont to monitor 
Dressing change performed  Cleansed wound with NSS  Staples intact, no redness or drainage noted  Applied xeroform, 4X4s, and abds, secured with tape  Pt tolerated well  Call bell within reach will continue to monitor progress 
New order for apple at pt request   Voiding on bedpan with no issues  norco given for pain 
Patient belongings returned, AVS printed, report called to facility, no further questions at this time, patient verbalizes intent to comply with follow-up care 
Patient taken to OR by OR staff, made aware that PreOp checklist still needs to be completed 
Patient transferred via bed to room 110-2; medical surgical status  Patient stable at time of transfer  Accepting RN  Will call for report 
Pt alert and oriented, pleasant and cooperative  Heart is regular, edema noted  Lungs are clear and decreased on room air  Dressing in place on left hip, clean dry and intact  States pain is 'not bad right now'  Aware she is getting one more unit of prbc, told me 'take your time, I feel like I'm getting bloated'  Hourly rounding reviewed, no needs at this time  Call bell within reach 
Pt received from or to icu #6, s/p TFN left hip  Pt awake and alert on arrival to icu  Temp 99 1  First unit prbc infusing from OR  Pt placed on icu monitors  Assessment as noted in computer charting  Surgical dressing assessed  Safety in place  Awaiting transfer orders 
Pt received from the icu to room 110 2, oriented to the room and the callbell, report from icu rn, neurovasc checks intact to the pts left leg, dsg cdi, pain controlled with the meds ordered, family at the pt bedside, callbell in reach of the pt
Pt requested temazepam, home med that was not ordered  Miki BLAIR notified, new order written, given per order  prbc infusing now, no issues  Call bell within reach 
Pt stated she only did one step with PT  Asking aides to 'move the blanket a pinch and fix pillow behind my head', states she cannot lean forward to reach her water on the overbed table  States her pain is a 3-4 at this time, admin'd scheduled tylenol, she thinks 'that will be enough'  Will continue with care plan 
Pt with decreased urine output  Notified hospitalist order received for 500 fluid bolus  pts vitals are stable and she denies pain  Will cont to monitor 
RESTING AFTER 1 DOSE DILAUDID ADMINISTERED  LEFT LEG IS EXTERNAL ROTATED  POSITIVE LEFT PEDAL PULSE  ADEQ CAP REFILL  IVF ABSORB PER ORDERS   CALL BELL GIVEN
Spoke with pt daughter via telephone  Daughter states that her mother called her and said that she wasn't given anything to pass her stools and that her mother is in pain  Explained to daughter that the pt  receives pain medication that is ordered around the clock as well as medication as needed  Explained that the pt needed to ask for PRN medications, and if the pt denies pain or denies the PRN medication I cannot force the pt to take them  Further explained that the pt was given a suppository as well as miralax this afternoon to help pass her stool  Daughter seemed displeased with my explanation 
prbc transfusion completed, pt currently sleeping, no s/s of pain or distress, no reactions noted  Call bell is within reach 
No

## 2019-11-20 NOTE — PHYSICAL THERAPY NOTE
11/20/19 0846   Pain Assessment   Pain Assessment 0-10   Pain Score 5   Pain Type Surgical pain   Pain Location Leg   Pain Orientation Left   Restrictions/Precautions   Weight Bearing Precautions Per Order Yes   LLE Weight Bearing Per Order WBAT   Other Precautions WBS; Fall Risk;Multiple lines;Pain   General   Chart Reviewed Yes   Response to Previous Treatment Patient with no complaints from previous session  Family/Caregiver Present No   Cognition   Overall Cognitive Status WFL   Arousal/Participation Alert; Cooperative   Attention Within functional limits   Orientation Level Oriented X4   Memory Within functional limits   Following Commands Follows all commands and directions without difficulty   Comments pt agreeable to participate    Subjective   Subjective "I have to go to the bathroom"   Bed Mobility   Rolling R 4  Minimal assistance   Additional items Assist x 1;Bedrails; Increased time required;Verbal cues;LE management   Rolling L 4  Minimal assistance   Additional items Assist x 1;Bedrails; Increased time required;Verbal cues;LE management   Supine to Sit 3  Moderate assistance   Additional items Assist x 1;HOB elevated; Increased time required;LE management;Verbal cues   Sit to Supine 3  Moderate assistance   Additional items Assist x 2;HOB elevated; Bedrails; Increased time required;Verbal cues;LE management   Transfers   Sit to Stand 3  Moderate assistance   Additional items Assist x 2; Increased time required;Verbal cues   Stand to Sit 3  Moderate assistance   Additional items Assist x 2;Verbal cues; Increased time required   Stand pivot 3  Moderate assistance   Additional items Assist x 2;Armrests; Increased time required;Verbal cues   Toilet transfer 3  Moderate assistance   Additional items Assist x 2;Armrests; Increased time required;Verbal cues; Commode   Balance   Static Sitting Good   Dynamic Sitting Fair +   Static Standing Fair   Dynamic Standing Fair -   Endurance Deficit   Endurance Deficit Yes   Activity Tolerance   Activity Tolerance Patient limited by pain   Exercises   Quad Sets Supine;10 reps;AROM; Left   Heelslides Supine;10 reps; Left;AAROM   Glute Sets Supine;10 reps;AROM   Hip Flexion Supine;10 reps;AAROM; Left   Hip Abduction Supine;10 reps;AAROM; Left   Hip Adduction Supine;10 reps;AAROM; Left   Knee AROM Short Arc Quad Supine;10 reps;AROM; Left   Ankle Pumps Supine;10 reps;AROM; Bilateral   Assessment   Prognosis Good   Problem List Decreased strength;Decreased range of motion;Decreased endurance; Impaired balance;Decreased mobility; Decreased safety awareness;Orthopedic restrictions;Pain   Assessment Pt seen for PT treatment session this date with interventions consisting of Therapeutic exercise consisting of: AROM and AAROM 10 reps B LE and L LE in supine position, therapeutic activity consisting of training: bed mobility, supine<>sit transfers, sit<>stand transfers and stand pivot transfers towards both direction and standing activity for assistance with pericare  Pt agreeable to PT treatment session upon arrival, pt found supine in bed w/ HOB elevated, in no apparent distress  In comparison to previous session, pt with no improvements as evidenced by requiring the same amount of assistance with transfers  Post session: pt returned BTB and all needs in reach Continue to recommend STR at time of d/c in order to maximize pt's functional independence and safety w/ mobility  Pt continues to be functioning below baseline level, and remains limited 2* factors listed above and including decreased functional mobility, and decreased activity tolerance  PT will continue to see pt while here in order to address the deficits listed above and provide interventions consistent w/ POC in effort to achieve STGs  Goals   Patient Goals to go home   PT Treatment Day 5   Plan   Treatment/Interventions Functional transfer training;LE strengthening/ROM; Therapeutic exercise; Endurance training;Bed mobility;Gait training   Progress Progressing toward goals   PT Frequency 7x/wk   Recommendation   Recommendation Short-term skilled PT   Equipment Recommended Walker   PT - OK to Discharge Yes   Additional Comments when medically stable   Suellen Alaniz, PTA

## 2019-11-20 NOTE — SOCIAL WORK
Chart reviewed d/c order written, Susie Nova was received -approved by Rachel Steele auth# [id number] for admission today nrd 11/25/19 to  [facility] phone # [phone] ext# [phone] fax# [phone] information was given to the snf, report # and fax# and d/c time was given to the center and rn,  Pt's family wanted to transport ,but when I discussed with the therapist they did not recommend that mode of transport and they did agree with w/c [facility], I did call [facility] @ 11:05 and they were unable to help today, but could help tomorrow, I then called Chrissie w/c [facility] and they were not able, apts, cetronia and medistat also unable , [facility] and they were able to transport the pt today they were to arrive at 11:25 am I made the pt ,family and rn aware, RN was isabella ovalles phone # and fax# tos given report and send d/c instructions, pt and family were in agreement with the d/c plan, I did review their benefits, pt has a ppo plan and has in and out of network benefits,pt has no copay for days 1-20 and days  $172 oo/day I did let the snf know thar Dr Lexy Hare is a doctor that is par for insurance, I spoke with Rachel Steele at [employer] and she stated it really did not matter what doctor she used,pt d/c to [facility] today

## 2019-11-20 NOTE — PLAN OF CARE
Problem: DISCHARGE PLANNING - CARE MANAGEMENT  Goal: Discharge to post-acute care or home with appropriate resources  Description  INTERVENTIONS:  - Conduct assessment to determine patient/family and health care team treatment goals, and need for post-acute services based on payer coverage, community resources, and patient preferences, and barriers to discharge  - Address psychosocial, clinical, and financial barriers to discharge as identified in assessment in conjunction with the patient/family and health care team  - Arrange appropriate level of post-acute services according to patient's   needs and preference and payer coverage in collaboration with the physician and health care team  - Communicate with and update the patient/family, physician, and health care team regarding progress on the discharge plan  - Arrange appropriate transportation to post-acute venues    Pt's goal is to return home after some rehab   Outcome: Completed

## 2019-11-20 NOTE — ASSESSMENT & PLAN NOTE
· Resolved, due to fracture of the hip and blood losses evidenced by hypotension(86/39, 75/54), tachycardia (111-114), Hbg drop (12 4 to 8 0), treated with transfusion of PRBCs, Levophed, NSS bolus, continuous LR, ICU admission, hemodynamic monitoring, and lab monitoring     · Resolved

## 2019-11-20 NOTE — ASSESSMENT & PLAN NOTE
· Patient is status post an ORIF of the right hip on 11/15/2019  · Postop day 5  · All management, including pain and DVT prophylaxis is per Orthopedics

## 2019-11-21 ENCOUNTER — TELEPHONE (OUTPATIENT)
Dept: OBGYN CLINIC | Facility: HOSPITAL | Age: 79
End: 2019-11-21

## 2019-11-21 NOTE — TELEPHONE ENCOUNTER
I spoke with Ariadna Agudelo who was calling for wound care orders  I advised according to discharge it will be a daily dry sterile dressing change and PRN, appt for PO made for 11/27  Patient is currently afebrile 98 9  Small amount of serosanguinous drainage to incisional dressing  Denies redness, increased swelling or openings to incision  I advised that they should monitor for these symptoms and call our office for a sooner appt should they arise  Sedz verbalized understanding

## 2019-11-21 NOTE — TELEPHONE ENCOUNTER
[facility]  Call back number: [phone]  Patient's doctor: Dr April Marley    Patient was admitted yesterday  Over night she was running a fever of 101  There is a small amount of drainage on the incision  She is asking for a call back

## 2019-11-21 NOTE — UTILIZATION REVIEW
Notification of Discharge  This is a Notification of Discharge from our facility [address]  Please be advised that this patient has been discharge from our facility  Below you will find the admission and discharge date and time including the patients disposition  PRESENTATION DATE: 11/14/2019  1:19 PM  OBS ADMISSION DATE:   IP ADMISSION DATE: 11/14/19 1629   DISCHARGE DATE: 11/20/2019 12:16 PM  DISPOSITION: Non [facility] Non [facility] SN[facility]/S[facility]   Admission Orders listed below:  Admission Orders (From admission, onward)     Ordered        11/14/19 1744  Inpatient Admission  Once         11/14/19 1646  Inpatient Admission  Once         11/14/19 1633  Inpatient Admission  Once,   Status:  Canceled                   Please contact the UR Department if additional information is required to close this patient's authorization/case  [address] Utilization Review Department  Main: [phone] x carefully listen to the prompts  All voicemails are confidential   [email or web address]  Send all requests for admission clinical reviews, approved or denied determinations and any other requests to dedicated fax number below belonging to the campus where the patient is receiving treatment    List of dedicated fax numbers:  [address] [facility] [address] DENIALS (Administrative/Medical Necessity) [phone]   [address] [place] [address] (Maternity/NICU/Pediatrics) [phone]   Clementina Owen [phone]   Arias Nickerson [phone]   Jonathan Aguirre [phone]   [address] [facility] [address] [place] [address] [phone]   Linh Leger [address] [phone]

## 2019-11-27 ENCOUNTER — OFFICE VISIT (OUTPATIENT)
Dept: OBGYN CLINIC | Facility: CLINIC | Age: 79
End: 2019-11-27

## 2019-11-27 VITALS
HEIGHT: 64 IN | DIASTOLIC BLOOD PRESSURE: 73 MMHG | HEART RATE: 83 BPM | BODY MASS INDEX: 23.9 KG/M2 | WEIGHT: 140 LBS | SYSTOLIC BLOOD PRESSURE: 135 MMHG

## 2019-11-27 DIAGNOSIS — S72.22XD CLOSED DISPLACED SUBTROCHANTERIC FRACTURE OF LEFT FEMUR WITH ROUTINE HEALING, SUBSEQUENT ENCOUNTER: Primary | ICD-10-CM

## 2019-11-27 DIAGNOSIS — S72.002D CLOSED FRACTURE OF LEFT HIP WITH ROUTINE HEALING, SUBSEQUENT ENCOUNTER: ICD-10-CM

## 2019-11-27 PROCEDURE — 99024 POSTOP FOLLOW-UP VISIT: CPT | Performed by: ORTHOPAEDIC SURGERY

## 2019-11-27 RX ORDER — HYDROCODONE BITARTRATE AND ACETAMINOPHEN 5; 300 MG/1; MG/1
1 TABLET ORAL EVERY 6 HOURS PRN
COMMUNITY
End: 2021-11-07

## 2019-11-27 NOTE — PROGRESS NOTES
Assessment:     1  Closed displaced subtrochanteric fracture of left femur with routine healing, subsequent encounter    2  Closed fracture of left hip with routine healing, subsequent encounter          Plan:     Problem List Items Addressed This Visit        Musculoskeletal and Integument    Closed left hip fracture [facility])     44-year-old female with a healing left subtrochanteric hip fracture  Incisions look good today  She was encouraged on continue progressive weight-bearing and strengthening  She appears to have a small pressure sore, stage I on her sacrum  She was counseled on the importance of regular position changes  Follow-up primary care doctor regarding dizziness  Follow up with me in one month x-rays of left femur  Other Visit Diagnoses     Closed displaced subtrochanteric fracture of left femur with routine healing, subsequent encounter    -  Primary           Patient ID: Constanza Santana is a 70 y o  female  Chief Complaint:  Post op left hip    Subjective:  44-year-old female status post intramedullary nail of the left femur subtrochanteric fracture on November 15th, 2019  Patient was weight-bearing as tolerated postoperatively  She is currently staying in a rehab facility  She is trying to get home to stay with her daughter in law  She has too much pain to put full weight on her left leg at this time  Allergy:  Allergies   Allergen Reactions    Penicillins        Medications:  all current active meds have been reviewed    ROS:  Review of Systems   Constitutional: Positive for fever  HENT: Positive for hearing loss  Eyes: Positive for visual disturbance  Gastrointestinal: Positive for abdominal pain  Neurological: Positive for dizziness, weakness and headaches  Psychiatric/Behavioral: The patient is nervous/anxious  All other systems reviewed and are negative        Objective:  BP Readings from Last 1 Encounters:   11/27/19 135/73      Wt Readings from Last 1 Encounters:   11/27/19 63 5 kg (140 lb)        Exam:   Physical Exam  Left Hip Exam     Comments:  Incisions healing well with no erythema or drainage  Discomfort with hip range of motion  Resolving ecchymosis  Radiographs:  I have personally reviewed pertinent films in PACS and my interpretation is No new radiographs today

## 2019-11-27 NOTE — ASSESSMENT & PLAN NOTE
28-year-old female with a healing left subtrochanteric hip fracture  Incisions look good today  She was encouraged on continue progressive weight-bearing and strengthening  She appears to have a small pressure sore, stage I on her sacrum  She was counseled on the importance of regular position changes  Follow-up primary care doctor regarding dizziness  Follow up with me in one month x-rays of left femur

## 2019-12-06 ENCOUNTER — TELEPHONE (OUTPATIENT)
Dept: OBGYN CLINIC | Facility: HOSPITAL | Age: 79
End: 2019-12-06

## 2019-12-06 NOTE — TELEPHONE ENCOUNTER
Patient sees Dr Tristin Pederson the nurse from [facility] left a voicemail stating patient is being discharged on 12/11  Patient is currently on Lovenox for 28 days which would end on 12/19  She would like to know if patient should continue the Lovenox after discharge? CB: [phone] and can ask to speak with the nurse for patient

## 2019-12-09 NOTE — TELEPHONE ENCOUNTER
I spoke with [facility] and gave them above information and they verbalized understanding

## 2019-12-27 ENCOUNTER — OFFICE VISIT (OUTPATIENT)
Dept: OBGYN CLINIC | Facility: CLINIC | Age: 79
End: 2019-12-27

## 2019-12-27 ENCOUNTER — APPOINTMENT (OUTPATIENT)
Dept: RADIOLOGY | Facility: MEDICAL CENTER | Age: 79
End: 2019-12-27
Payer: COMMERCIAL

## 2019-12-27 VITALS
HEART RATE: 76 BPM | WEIGHT: 138.6 LBS | BODY MASS INDEX: 23.66 KG/M2 | DIASTOLIC BLOOD PRESSURE: 78 MMHG | HEIGHT: 64 IN | SYSTOLIC BLOOD PRESSURE: 135 MMHG

## 2019-12-27 DIAGNOSIS — S72.002D CLOSED FRACTURE OF LEFT HIP WITH ROUTINE HEALING, SUBSEQUENT ENCOUNTER: ICD-10-CM

## 2019-12-27 DIAGNOSIS — S72.002D CLOSED FRACTURE OF LEFT HIP WITH ROUTINE HEALING, SUBSEQUENT ENCOUNTER: Primary | ICD-10-CM

## 2019-12-27 PROCEDURE — 99024 POSTOP FOLLOW-UP VISIT: CPT | Performed by: ORTHOPAEDIC SURGERY

## 2019-12-27 PROCEDURE — 73552 X-RAY EXAM OF FEMUR 2/>: CPT

## 2019-12-27 NOTE — PROGRESS NOTES
Assessment:     1  Closed fracture of left hip with routine healing, subsequent encounter          Plan:     Problem List Items Addressed This Visit        Musculoskeletal and Integument    Closed left hip fracture ([address] ) - Primary     79-year-old female with continued healing and progression following TFN for proximal femur fracture  She will continue working with therapy, weight-bearing as tolerated, no restrictions  Follow-up in two months with x-rays of the left femur  Relevant Orders    XR femur 2 vw left           Patient ID: Jax Titus is a 70 y o  female  Chief Complaint:  Post op left hip    Subjective:  79-year-old female status post intramedullary nail of the left femur subtrochanteric fracture on November 15th, 2019  Patient was weight-bearing as tolerated postoperatively  She's living with her daughter in law  Knee is painful  Hard to straighten knee, slightly worse than prior to fx  Working with home PT        Allergy:  Allergies   Allergen Reactions    Penicillins        Medications:  all current active meds have been reviewed    ROS:  Review of Systems   All other systems reviewed and are negative  Objective:  BP Readings from Last 1 Encounters:   12/27/19 135/78      Wt Readings from Last 1 Encounters:   12/27/19 62 9 kg (138 lb 9 6 oz)        Exam:   Physical Exam  Left Hip Exam     Comments:  Pain in knee with knee and hip ROM, limited IR and ER of hip  TTP over it band  Radiographs:  I have personally reviewed pertinent films in PACS and my interpretation is well maintained alignment of her fracture I with healing evident    The distal interlocking screw is slightly backing out

## 2019-12-27 NOTE — ASSESSMENT & PLAN NOTE
44-year-old female with continued healing and progression following TFN for proximal femur fracture  She will continue working with therapy, weight-bearing as tolerated, no restrictions  Follow-up in two months with x-rays of the left femur

## 2020-01-08 ENCOUNTER — TELEPHONE (OUTPATIENT)
Dept: OBGYN CLINIC | Facility: HOSPITAL | Age: 80
End: 2020-01-08

## 2020-01-08 NOTE — TELEPHONE ENCOUNTER
I called patient to talk to her in regards to her disability paperwork  The first page of her paperwork was missing   Jonna Ken (son) will email me this paperwork tomorrow morning

## 2020-01-16 NOTE — TELEPHONE ENCOUNTER
I called the patient again to confirm that I have not received the disability forms  Vicenta Nunez (daughter in law) advised me to disregard the disbaility paperwork  Mirlandenyaely Soto will not be needing it anymore

## 2020-01-28 ENCOUNTER — LAB REQUISITION (OUTPATIENT)
Dept: LAB | Facility: HOSPITAL | Age: 80
End: 2020-01-28
Payer: COMMERCIAL

## 2020-01-28 DIAGNOSIS — S72.142D DISPLACED INTERTROCHANTERIC FRACTURE OF LEFT FEMUR, SUBSEQUENT ENCOUNTER FOR CLOSED FRACTURE WITH ROUTINE HEALING: ICD-10-CM

## 2020-01-28 LAB
BACTERIA UR QL AUTO: ABNORMAL /HPF
BILIRUB UR QL STRIP: NEGATIVE
CLARITY UR: ABNORMAL
COLOR UR: YELLOW
GLUCOSE UR STRIP-MCNC: NEGATIVE MG/DL
HGB UR QL STRIP.AUTO: NEGATIVE
KETONES UR STRIP-MCNC: NEGATIVE MG/DL
LEUKOCYTE ESTERASE UR QL STRIP: ABNORMAL
NITRITE UR QL STRIP: NEGATIVE
NON-SQ EPI CELLS URNS QL MICRO: ABNORMAL /HPF
PH UR STRIP.AUTO: 5.5 [PH]
PROT UR STRIP-MCNC: NEGATIVE MG/DL
RBC #/AREA URNS AUTO: ABNORMAL /HPF
SP GR UR STRIP.AUTO: 1.02 (ref 1–1.03)
UROBILINOGEN UR QL STRIP.AUTO: 0.2 E.U./DL
WBC #/AREA URNS AUTO: ABNORMAL /HPF

## 2020-01-28 PROCEDURE — 87086 URINE CULTURE/COLONY COUNT: CPT | Performed by: FAMILY MEDICINE

## 2020-01-28 PROCEDURE — 81003 URINALYSIS AUTO W/O SCOPE: CPT | Performed by: FAMILY MEDICINE

## 2020-01-28 PROCEDURE — 81001 URINALYSIS AUTO W/SCOPE: CPT | Performed by: FAMILY MEDICINE

## 2020-01-28 PROCEDURE — 87186 SC STD MICRODIL/AGAR DIL: CPT | Performed by: FAMILY MEDICINE

## 2020-01-28 PROCEDURE — 87077 CULTURE AEROBIC IDENTIFY: CPT | Performed by: FAMILY MEDICINE

## 2020-01-30 LAB
BACTERIA UR CULT: ABNORMAL
BACTERIA UR CULT: ABNORMAL

## 2020-02-28 ENCOUNTER — APPOINTMENT (OUTPATIENT)
Dept: RADIOLOGY | Facility: MEDICAL CENTER | Age: 80
End: 2020-02-28
Payer: COMMERCIAL

## 2020-02-28 ENCOUNTER — OFFICE VISIT (OUTPATIENT)
Dept: OBGYN CLINIC | Facility: CLINIC | Age: 80
End: 2020-02-28
Payer: COMMERCIAL

## 2020-02-28 DIAGNOSIS — S72.002D CLOSED FRACTURE OF LEFT HIP WITH ROUTINE HEALING, SUBSEQUENT ENCOUNTER: Primary | ICD-10-CM

## 2020-02-28 DIAGNOSIS — S72.002D CLOSED FRACTURE OF LEFT HIP WITH ROUTINE HEALING, SUBSEQUENT ENCOUNTER: ICD-10-CM

## 2020-02-28 PROCEDURE — 99213 OFFICE O/P EST LOW 20 MIN: CPT | Performed by: ORTHOPAEDIC SURGERY

## 2020-02-28 PROCEDURE — 73552 X-RAY EXAM OF FEMUR 2/>: CPT

## 2020-02-28 RX ORDER — MEMANTINE HYDROCHLORIDE 5 MG/1
5 TABLET ORAL 2 TIMES DAILY
COMMUNITY
Start: 2020-01-21

## 2020-02-28 NOTE — PROGRESS NOTES
Assessment/Plan   Diagnoses and all orders for this visit:    Closed fracture of left hip with routine healing, subsequent encounter  -     XR femur 2 vw left; Future  -     Ambulatory referral to Physical Therapy; Future    Other orders  -     memantine (NAMENDA) 5 mg tablet; 5 mg 2 (two) times a day        70-year-old female with a healing left intertrochanteric hip fracture  She will be weight-bearing as tolerated and continue working with physical therapy  She is showing excellent progress at this time  She will follow up me on an as-needed basis  Subjective:   Patient ID: Castillo Jackson is a 70 y o  female  HPI    Patient presents for postop evaluation of femoral IM nail insertion for left femur fracture  Patient reports that she has been consistent with home based PT and use of walker  She reports mild soreness with prolonged weight bearing activity, or when lying on the effected side  She denies any bruising, swelling, numbness, tingling, or instability  She is still taking 1 Vicodin in the mornings, but is transitioning to Ibuprofen  She has been discharged from home based PT and will need a script to begin ambulatory therapy  The following portions of the patient's history were reviewed and updated as appropriate: allergies, current medications, past family history, past medical history, past social history, past surgical history and problem list     Past Medical History:   Diagnosis Date    Hypertension      Past Surgical History:   Procedure Laterality Date    OH OPEN RX FEMUR FX+INTRAMED LOWELL Left 11/15/2019    Procedure: INSERTION NAIL IM FEMUR ANTEGRADE (TROCHANTERIC); Surgeon: Bennie Emmanuel MD;  Location: [address] [place] [address];  Service: Orthopedics     History reviewed  No pertinent family history  Social History     Socioeconomic History    Marital status:       Spouse name: None    Number of children: None    Years of education: None    Highest education level: None   Occupational History    None   Social Needs    Financial resource strain: None    Food insecurity:     Worry: None     Inability: None    Transportation needs:     Medical: None     Non-medical: None   Tobacco Use    Smoking status: Never Smoker    Smokeless tobacco: Never Used   Substance and Sexual Activity    Alcohol use: Not Currently    Drug use: Never    Sexual activity: None   Lifestyle    Physical activity:     Days per week: None     Minutes per session: None    Stress: None   Relationships    Social connections:     Talks on phone: None     Gets together: None     Attends [employer] service: None     Active member of club or organization: None     Attends meetings of clubs or organizations: None     Relationship status: None    Intimate partner violence:     Fear of current or ex partner: None     Emotionally abused: None     Physically abused: None     Forced sexual activity: None   Other Topics Concern    None   Social History Narrative    None       Current Outpatient Medications:     benazepril (LOTENSIN) 10 mg tablet, Take 1 tablet (10 mg total) by mouth daily, Disp: , Rfl: 0    bisacodyl (DULCOLAX) 10 mg suppository, Insert 1 suppository (10 mg total) into the rectum daily as needed for constipation, Disp: 12 suppository, Rfl: 0    HYDROcodone-acetaminophen (XODOL) 5-300 MG per tablet, Take 1 tablet by mouth every 6 (six) hours as needed for moderate pain, Disp: , Rfl:     polyethylene glycol (MIRALAX) 17 g packet, Take 17 g by mouth daily, Disp: 14 each, Rfl: 0    senna-docusate sodium (SENOKOT S) 8 6-50 mg per tablet, Take 2 tablets by mouth daily at bedtime, Disp: , Rfl: 0    acetaminophen (TYLENOL) 325 mg tablet, Take 2 tablets (650 mg total) by mouth every 6 (six) hours as needed for mild pain or fever (Patient not taking: Reported on 2/28/2020), Disp: 30 tablet, Rfl: 0    acetaminophen (TYLENOL) 325 mg tablet, Take 2 tablets (650 mg total) by mouth every 8 (eight) hours (Patient not taking: Reported on 2/28/2020), Disp: 30 tablet, Rfl: 0    Apoaequorin 10 MG CAPS, Take 1 capsule (10 mg total) by mouth daily, Disp: , Rfl: 0    gabapentin (NEURONTIN) 100 mg capsule, Take 1 capsule (100 mg total) by mouth 3 (three) times a day (Patient not taking: Reported on 2/28/2020), Disp: , Rfl: 0    memantine (NAMENDA) 5 mg tablet, 5 mg 2 (two) times a day, Disp: , Rfl:     temazepam (RESTORIL) 30 mg capsule, Take 1 capsule (30 mg total) by mouth daily at bedtime, Disp: 30 capsule, Rfl: 0    Allergies   Allergen Reactions    Penicillins        Review of Systems   Constitutional: Negative for chills, fever and unexpected weight change  HENT: Positive for hearing loss  Negative for nosebleeds and sore throat  Eyes: Positive for pain  Negative for redness and visual disturbance  Respiratory: Negative for cough, shortness of breath and wheezing  Cardiovascular: Negative for chest pain, palpitations and leg swelling  Gastrointestinal: Negative for abdominal pain, nausea and vomiting  Endocrine: Negative for polydipsia and polyuria  Genitourinary: Negative for dysuria and hematuria  Musculoskeletal:        As noted in HPI   Skin: Negative for rash and wound  Neurological: Positive for dizziness  Negative for numbness and headaches  Psychiatric/Behavioral: Negative for decreased concentration and suicidal ideas  The patient is nervous/anxious  Objective: There were no vitals taken for this visit      Ortho Exam   Patient presents using walker as ambulatory device  There is no obvious deformity  Surgical incision is well-healed with no signs of erythema, ecchymosis, or infection  There is no soft tissue swelling or edema noted on exam  Patient is mildly tender to palpation, with palpable scar tissue proliferation  Demonstrates acceptable range of motion  2+ TP pulse  Sensation intact distally  No pain with range of motion of the hip  Patient able to stand up out of a chair easily on her own    Physical Exam    Attending Physician has personally reviewed pertinent imaging and/or reports in PACS, impression is as follows:    Review of radiographic series taken 2/28/2020 of the left hip shows still visible femoral fracture with callus formation, well-maintained alignment, and well-maintained fixation      Scribe Attestation    I,:   Kiel Steven am acting as a scribe while in the presence of the attending physician :        I,:   Sarah Griggs MD personally performed the services described in this documentation    as scribed in my presence :

## 2020-02-28 NOTE — PATIENT INSTRUCTIONS
Transition to cane under supervision of PT  Driving is ok as long as she can get in and out of care comfortably and drive safely

## 2020-05-04 ENCOUNTER — EVALUATION (OUTPATIENT)
Dept: PHYSICAL THERAPY | Facility: CLINIC | Age: 80
End: 2020-05-04
Payer: COMMERCIAL

## 2020-05-04 DIAGNOSIS — S72.002D CLOSED FRACTURE OF LEFT HIP WITH ROUTINE HEALING, SUBSEQUENT ENCOUNTER: ICD-10-CM

## 2020-05-04 PROCEDURE — 97110 THERAPEUTIC EXERCISES: CPT | Performed by: PHYSICAL THERAPIST

## 2020-05-04 PROCEDURE — 97162 PT EVAL MOD COMPLEX 30 MIN: CPT | Performed by: PHYSICAL THERAPIST

## 2020-05-12 ENCOUNTER — OFFICE VISIT (OUTPATIENT)
Dept: PHYSICAL THERAPY | Facility: CLINIC | Age: 80
End: 2020-05-12
Payer: COMMERCIAL

## 2020-05-12 DIAGNOSIS — S72.002D CLOSED FRACTURE OF LEFT HIP WITH ROUTINE HEALING, SUBSEQUENT ENCOUNTER: Primary | ICD-10-CM

## 2020-05-12 PROCEDURE — 97116 GAIT TRAINING THERAPY: CPT | Performed by: PHYSICAL THERAPIST

## 2020-05-12 PROCEDURE — 97110 THERAPEUTIC EXERCISES: CPT | Performed by: PHYSICAL THERAPIST

## 2020-05-13 ENCOUNTER — APPOINTMENT (OUTPATIENT)
Dept: PHYSICAL THERAPY | Facility: CLINIC | Age: 80
End: 2020-05-13
Payer: COMMERCIAL

## 2020-05-20 ENCOUNTER — OFFICE VISIT (OUTPATIENT)
Dept: PHYSICAL THERAPY | Facility: CLINIC | Age: 80
End: 2020-05-20
Payer: COMMERCIAL

## 2020-05-20 DIAGNOSIS — S72.002D CLOSED FRACTURE OF LEFT HIP WITH ROUTINE HEALING, SUBSEQUENT ENCOUNTER: Primary | ICD-10-CM

## 2020-05-20 PROCEDURE — 97110 THERAPEUTIC EXERCISES: CPT

## 2020-05-20 PROCEDURE — 97112 NEUROMUSCULAR REEDUCATION: CPT

## 2020-05-25 ENCOUNTER — APPOINTMENT (OUTPATIENT)
Dept: PHYSICAL THERAPY | Facility: CLINIC | Age: 80
End: 2020-05-25
Payer: COMMERCIAL

## 2020-05-27 ENCOUNTER — OFFICE VISIT (OUTPATIENT)
Dept: PHYSICAL THERAPY | Facility: CLINIC | Age: 80
End: 2020-05-27
Payer: COMMERCIAL

## 2020-05-27 DIAGNOSIS — S72.002D CLOSED FRACTURE OF LEFT HIP WITH ROUTINE HEALING, SUBSEQUENT ENCOUNTER: Primary | ICD-10-CM

## 2020-05-27 PROCEDURE — 97112 NEUROMUSCULAR REEDUCATION: CPT

## 2020-05-27 PROCEDURE — 97110 THERAPEUTIC EXERCISES: CPT

## 2020-05-29 ENCOUNTER — OFFICE VISIT (OUTPATIENT)
Dept: PHYSICAL THERAPY | Facility: CLINIC | Age: 80
End: 2020-05-29
Payer: COMMERCIAL

## 2020-05-29 DIAGNOSIS — S72.002D CLOSED FRACTURE OF LEFT HIP WITH ROUTINE HEALING, SUBSEQUENT ENCOUNTER: Primary | ICD-10-CM

## 2020-05-29 PROCEDURE — 97140 MANUAL THERAPY 1/> REGIONS: CPT

## 2020-05-29 PROCEDURE — 97110 THERAPEUTIC EXERCISES: CPT

## 2020-06-01 ENCOUNTER — OFFICE VISIT (OUTPATIENT)
Dept: PHYSICAL THERAPY | Facility: CLINIC | Age: 80
End: 2020-06-01
Payer: COMMERCIAL

## 2020-06-01 DIAGNOSIS — S72.002D CLOSED FRACTURE OF LEFT HIP WITH ROUTINE HEALING, SUBSEQUENT ENCOUNTER: Primary | ICD-10-CM

## 2020-06-01 PROCEDURE — 97140 MANUAL THERAPY 1/> REGIONS: CPT

## 2020-06-01 PROCEDURE — 97110 THERAPEUTIC EXERCISES: CPT

## 2020-06-03 ENCOUNTER — OFFICE VISIT (OUTPATIENT)
Dept: PHYSICAL THERAPY | Facility: CLINIC | Age: 80
End: 2020-06-03
Payer: COMMERCIAL

## 2020-06-03 DIAGNOSIS — S72.002D CLOSED FRACTURE OF LEFT HIP WITH ROUTINE HEALING, SUBSEQUENT ENCOUNTER: Primary | ICD-10-CM

## 2020-06-03 PROCEDURE — 97110 THERAPEUTIC EXERCISES: CPT

## 2020-06-03 PROCEDURE — 97140 MANUAL THERAPY 1/> REGIONS: CPT

## 2020-06-08 ENCOUNTER — EVALUATION (OUTPATIENT)
Dept: PHYSICAL THERAPY | Facility: CLINIC | Age: 80
End: 2020-06-08
Payer: COMMERCIAL

## 2020-06-08 DIAGNOSIS — S72.002D CLOSED FRACTURE OF LEFT HIP WITH ROUTINE HEALING, SUBSEQUENT ENCOUNTER: Primary | ICD-10-CM

## 2020-06-08 PROCEDURE — 97116 GAIT TRAINING THERAPY: CPT | Performed by: PHYSICAL THERAPIST

## 2020-06-08 PROCEDURE — 97110 THERAPEUTIC EXERCISES: CPT | Performed by: PHYSICAL THERAPIST

## 2020-06-10 ENCOUNTER — OFFICE VISIT (OUTPATIENT)
Dept: PHYSICAL THERAPY | Facility: CLINIC | Age: 80
End: 2020-06-10
Payer: COMMERCIAL

## 2020-06-10 DIAGNOSIS — S72.002D CLOSED FRACTURE OF LEFT HIP WITH ROUTINE HEALING, SUBSEQUENT ENCOUNTER: Primary | ICD-10-CM

## 2020-06-10 PROCEDURE — 97110 THERAPEUTIC EXERCISES: CPT | Performed by: PHYSICAL THERAPIST

## 2020-06-10 PROCEDURE — 97116 GAIT TRAINING THERAPY: CPT | Performed by: PHYSICAL THERAPIST

## 2020-06-16 ENCOUNTER — APPOINTMENT (OUTPATIENT)
Dept: PHYSICAL THERAPY | Facility: CLINIC | Age: 80
End: 2020-06-16
Payer: COMMERCIAL

## 2020-06-18 ENCOUNTER — OFFICE VISIT (OUTPATIENT)
Dept: PHYSICAL THERAPY | Facility: CLINIC | Age: 80
End: 2020-06-18
Payer: COMMERCIAL

## 2020-06-18 DIAGNOSIS — S72.002D CLOSED FRACTURE OF LEFT HIP WITH ROUTINE HEALING, SUBSEQUENT ENCOUNTER: Primary | ICD-10-CM

## 2020-06-18 PROCEDURE — 97112 NEUROMUSCULAR REEDUCATION: CPT | Performed by: PHYSICAL THERAPIST

## 2020-06-18 PROCEDURE — 97110 THERAPEUTIC EXERCISES: CPT | Performed by: PHYSICAL THERAPIST

## 2020-06-24 ENCOUNTER — OFFICE VISIT (OUTPATIENT)
Dept: PHYSICAL THERAPY | Facility: CLINIC | Age: 80
End: 2020-06-24
Payer: COMMERCIAL

## 2020-06-24 DIAGNOSIS — S72.002D CLOSED FRACTURE OF LEFT HIP WITH ROUTINE HEALING, SUBSEQUENT ENCOUNTER: Primary | ICD-10-CM

## 2020-06-24 PROCEDURE — 97530 THERAPEUTIC ACTIVITIES: CPT | Performed by: PHYSICAL THERAPIST

## 2020-06-24 PROCEDURE — 97116 GAIT TRAINING THERAPY: CPT | Performed by: PHYSICAL THERAPIST

## 2020-06-24 PROCEDURE — 97110 THERAPEUTIC EXERCISES: CPT | Performed by: PHYSICAL THERAPIST

## 2020-07-01 ENCOUNTER — OFFICE VISIT (OUTPATIENT)
Dept: PHYSICAL THERAPY | Facility: CLINIC | Age: 80
End: 2020-07-01
Payer: COMMERCIAL

## 2020-07-01 DIAGNOSIS — S72.002D CLOSED FRACTURE OF LEFT HIP WITH ROUTINE HEALING, SUBSEQUENT ENCOUNTER: Primary | ICD-10-CM

## 2020-07-01 PROCEDURE — 97110 THERAPEUTIC EXERCISES: CPT | Performed by: PHYSICAL MEDICINE & REHABILITATION

## 2020-07-01 PROCEDURE — 97112 NEUROMUSCULAR REEDUCATION: CPT | Performed by: PHYSICAL MEDICINE & REHABILITATION

## 2020-07-01 NOTE — PROGRESS NOTES
Daily Note     Today's date: 2020  Patient name: Shell Urbina  : 1940  MRN: [id number]  Referring provider: Yovani Enriquez MD  Dx:   Encounter Diagnosis     ICD-10-CM    1  Closed fracture of left hip with routine healing, subsequent encounter S72 002D                   Subjective: no new sx/complaints  Reported to have pain/soreness in her "good leg" for the past several days  Objective: See treatment diary below      Assessment: Tolerated treatment well  Required several tactile and verbal cues when performing SLR in // bars for proper posture and to reduce compensations  Reported pain in back of R knee when performing TR on foam, reduced when performed on floor  Declined use of leg curl machine and further use of leg press due to back pain that "lasted several days"  Moderate swaying in romberg on foam and modified tandem with L leg behind  Reported "feeling a little dizzy" when performing balance activities; stated that it "comes and goes" and is not a new issue  Reported that the dizziness stopped after ~2 min and continued exercises without complaints  Continues to demonstrate balance difficulties and reduced hip strength  Patient demonstrated fatigue post treatment and would benefit from continued PT  Plan: Continue per plan of care  Progress treatment as tolerated         Precautions: Falls, confusion  Re-eval Date: 2020    Date       Visit Count 11 12      FOTO        Pain In        Pain Out                                                                           Vitals        Manuals                        Neuro Re-Ed        Rhom  1x30" on floor  2x30" on foam      Tandem  Modified tandem on floor 3x30" ea      SLS        Static heel raises 60" x 2  2 HHA                               Ther Ex        Aerobic Nustep  L3 10 min   w/UE Nustep L3  10' w/ UE      SLR x 3  Stand  Foam  2x10 5"  Target  1 HHA Standing on foam   2x10 //bars       HR/TR Stand  Foam  2x10 5"  Target  1 HHA Stand  Foam  2x10 5"    No foam for TR      Mini Squats Leg Press  10#  1x10  15#  2x10 Leg Press 10#  1x10    minisquats 2x10       LAQ Machine  3x10  11# extn       Bridge        *seated March  Seated marches 2x10, 5"       Stairs                        Ther Activity        ADL suite training Floor transfers - 8 mins to/from floor with VCs only               Gait Training        SPC Amb with SPC outdoors inclines/declins, grass, curb with cl S to min A       Stairs 4 steps, SPC and HR with supervision, 3 trials       Modalities                          Pt was instructed to verbalize any pain/discomfort to PT during/following tx   6/1/20

## 2020-07-07 ENCOUNTER — OFFICE VISIT (OUTPATIENT)
Dept: PHYSICAL THERAPY | Facility: CLINIC | Age: 80
End: 2020-07-07
Payer: COMMERCIAL

## 2020-07-07 DIAGNOSIS — S72.002D CLOSED FRACTURE OF LEFT HIP WITH ROUTINE HEALING, SUBSEQUENT ENCOUNTER: Primary | ICD-10-CM

## 2020-07-07 PROCEDURE — 97116 GAIT TRAINING THERAPY: CPT

## 2020-07-07 PROCEDURE — 97112 NEUROMUSCULAR REEDUCATION: CPT

## 2020-07-07 PROCEDURE — 97110 THERAPEUTIC EXERCISES: CPT

## 2020-07-07 NOTE — PROGRESS NOTES
Daily Note     Today's date: 2020  Patient name: Abbey Solorzano  : 1940  MRN: [id number]  Referring provider: Divya Clements MD  Dx:   Encounter Diagnosis     ICD-10-CM    1  Closed fracture of left hip with routine healing, subsequent encounter S72 002D        Start Time: 1005          Subjective: Patient states her pain is a 5/10 in the right knee  She is laughing because it is the opposite of the fractured side  Objective: See treatment diary below    Patient educated on how posture effects balance throughout transfers and steps for safe sit to stand transfers  Assessment: Tolerated treatment fair+  Patient would benefit from continued PT for balance and strengthening  Verbal cues needed for posture correction and proper performance of exercises throughout session  Patient seemed to understand all education given during today's session  Patient was able to decrease hand hold at times during standing balance activities  She declined leg press today stating it make her achy for several days after in her back  Patient rated her pain a 1/10 in the right knee by the end of the session  She liked today's session  Plan: Continue per plan of care  Progress treatment as tolerated         Precautions: Falls, confusion  Re-eval Date: 2020    Date      Visit Count 11 12 13     FOTO        Pain In        Pain Out                                                                           Vitals        Manuals                        Neuro Re-Ed        Rhom  1x30" on floor  2x30" on foam 2x30" on floor EO 0H  EC 1H  2x30" on foam 1H     Tandem  Modified tandem on floor 3x30" ea On floor 1H :30x2     SLS        Static heel raises 60" x 2  2 HHA                               Ther Ex        Aerobic Nustep  L3 10 min   w/UE Nustep L3  10' w/ UE Nustep L3  10' w/ UE     SLR x 3  Stand  Foam  2x10 5"  Target  1 HHA Standing on foam   2x10 //bars  Standing on foam   2x10 //bars  B/L HR/TR Stand  Foam  2x10 5"  Target  1 HHA Stand  Foam  2x10 5"    No foam for TR Stand  Foam  2x10 5" HR    No foam for TR 2x10     Mini Squats Leg Press  10#  1x10  15#  2x10 Leg Press 10#  1x10    minisquats 2x10  declined      minisquats 2x10      LAQ Machine  3x10  11# extn       Bridge        *seated March  Seated marches 2x10, 5"  Seated marches 2x10, 5"      Stairs                        Ther Activity        ADL suite training Floor transfers - 8 mins to/from floor with VCs only               Gait Training        SPC Amb with SPC outdoors inclines/declins, grass, curb with cl S to min A  amb with SPC  80 ft x1 close supervision     Stairs 4 steps, SPC and HR with supervision, 3 trials       Modalities                          Pt was instructed to verbalize any pain/discomfort to PT during/following tx   6/1/20

## 2020-07-09 ENCOUNTER — EVALUATION (OUTPATIENT)
Dept: PHYSICAL THERAPY | Facility: CLINIC | Age: 80
End: 2020-07-09
Payer: COMMERCIAL

## 2020-07-09 DIAGNOSIS — S72.002D CLOSED FRACTURE OF LEFT HIP WITH ROUTINE HEALING, SUBSEQUENT ENCOUNTER: Primary | ICD-10-CM

## 2020-07-09 PROCEDURE — 97116 GAIT TRAINING THERAPY: CPT | Performed by: PHYSICAL THERAPIST

## 2020-07-09 PROCEDURE — 97112 NEUROMUSCULAR REEDUCATION: CPT | Performed by: PHYSICAL THERAPIST

## 2020-07-09 PROCEDURE — 97110 THERAPEUTIC EXERCISES: CPT | Performed by: PHYSICAL THERAPIST

## 2020-07-09 NOTE — PROGRESS NOTES
PT Re-Evaluation     Today's date: 2020  Patient name: Cha Nieto  : 1940  MRN: [id number]  Referring provider: Ann-Marie Fernández MD  Dx:   Encounter Diagnosis     ICD-10-CM    1  Closed fracture of left hip with routine healing, subsequent encounter S72 002D                   Assessment  Assessment details: Cha Nieto is a 70 y o  female presenting to outpatient physical therapy with diagnosis of a closed fracture of left hip s/p left hip IM nail  Patient has made little progress this assessment period  Plan to continue x 1 week for family training in progression of HEP and to review SPC on stairs and uneven surfaces  Encouraged ongoing ambulation using SPC  Impairments: abnormal coordination, abnormal gait, abnormal or restricted ROM, abnormal movement, activity intolerance, impaired balance, impaired physical strength and lacks appropriate home exercise program  Barriers to therapy: Patient does not drive  Patient lacks confidence with SPC  Understanding of Dx/Px/POC: good   Prognosis: good    Goals  STGs to be achieved in 4 weeks:  1  Pt to demonstrate reduced subjective pain rating "at worst" by at least 2-3 points from Initial Eval in order to allow for reduced pain with ADLs and improved functional activity tolerance - MET  2  Pt to demonstrate increased AROM of left HS by at least 5-10 degrees in order to allow for greater ease and independence with ADLs and functional mobility - MET  3  Pt to demonstrate full PROM of left hip in order to maximize joint mobility and function and allow for progression of exercise program and achievement of goals - MET  4  Pt to demonstrate increased MMT of left LE by at least 1/2-1 grade in order to improve safety and stability with ADLs and functional mobility - MET    LTGs to be achieved in 6-8 weeks:  1   Pt will be I with HEP in order to continue to improve quality of life and independence and reduce risk for re-injury - NOT MET 2/2 decreased motivation   2  Pt to demonstrate return to community ambulation using least restrictive AD without limitations or restrictions - NOT MET 2/2 confidence  3  Pt to demonstrate improved function as noted by achieving or exceeding predicted score on FOTO outcomes assessment tool- MET      Plan  Patient would benefit from: skilled physical therapy  Other planned modality interventions: Modalities prn for symptom management  Planned therapy interventions: manual therapy, neuromuscular re-education, therapeutic exercise and home exercise program  Frequency: 2x week  Duration in visits: 8  Duration in weeks: 4  Plan of Care beginning date: 7/9/2020  Plan of Care expiration date: 8/8/2020  Treatment plan discussed with: patient        Subjective Evaluation    History of Present Illness  Date of onset: 11/14/2019  Date of surgery: 11/15/2019  Mechanism of injury: surgery and trauma  Mechanism of injury: UPDATE:  Patient continues to walk with [facility] only when made to by family  She demonstrates decreased motivation to return to OF  Daughter reports HEP at times, not consistently  reports continued use of RW as primary ambulation device  Discussed HEP compliance  Daughter requests another week to have better understanding of what her mother will be working on at home  Does note increased discomfort in left hip/back  Reports more sedentary as well  Patient reports improved ambulation  Able to walk with [facility], but doesn't feel confident  She notes no falls, no near misses  Reports doing her HEP "sometimes"  Daughter reports she does her HEP, but not as regularly as she should  Notes that she does encourage her mother, but mom doesn't like exercises  Overall, slowly returning to ADLs and housekeeping  Still limited  S/p fall on 11/14/2019, status post intramedullary nail of the left femur subtrochanteric fracture on November 15th, 2019  S/p hospital stay and then transfer to SNF for 20 days of rehab    She was then discharged home with her family and had home PT  She had a follow up with Dr Malgorzata Varela and was referred to [facility]  Ongoing deficits:  Intermittent dizziness  Intermittent pain at 4-5/10  Has not returned to driving  Standing to do dishes  Has not returned to cooking or baking  Has not returned to cleaning  Has not returned to standing for her entire shower  Has not returned to grocery shopping  Has not returned to negotiation of stairs  Does not feel confident with using cane  Pain  Current pain ratin  At best pain ratin  At worst pain ratin  Location: Left buttocks  Quality: knife-like, pressure and dull ache    Social Support  Steps to enter house: no  Stairs in house: no   Lives in: trailer  Lives with: adult children (Daughter and CITLALLI)    Employment status: not working  Hand dominance: right    Treatments  Previous treatment: physical therapy and medication  Current treatment: physical therapy  Patient Goals  Patient goals for therapy: increased strength, independence with ADLs/IADLs, return to sport/leisure activities, improved balance and decreased pain          Objective     Observations   Left Hip  Positive for deformity       Additional Observation Details  Left knee deformity lacking knee extension (+) left knee valgus    Lumbar Screen  Lumbar range of motion within normal limits with the following exceptions:Denies h/o LBP    Neurological Testing     Sensation     Hip   Left Hip   Intact: light touch and proprioception    Active Range of Motion   Left Hip   Flexion: 105 degrees   Extension: 30 degrees   Abduction: 45 degrees     Passive Range of Motion     Additional Passive Range of Motion Details  ROM left and right hips equal at this time    Strength/Myotome Testing     Left Hip   Planes of Motion   Flexion: 3+  Extension: 3+  Abduction: 3+  Adduction: 3+    Additional Strength Details  Left   Knee extension - 4/5   Knee flexion - 4/5  (+) crepitus  (+) tenderness per patient report from prior accident    Right   Knee extension: 4/5  Knee flexion: 4/5    **Questionable effort with MMT this date  Patient states "I just don't want to, I'm just old now"  General Comments:      Hip Comments   Gait:  RW with steady gait  SPC, hesitates on uneven surfaces to include inclines and grass surfaces  She requires encouragement during times she uses SPC      TUG RW: 19 sec  TUG SPC: 30 sec    EO Rhom: 30 sec foam  EC Rhom: 30 sec foam    TUG RW: 17 sec  TUG SPC: 24 sec  Max encouragement in both trials    EO and EC Rhom 30 sec foam  Declines to attempt tandem or SLS

## 2020-07-09 NOTE — PROGRESS NOTES
Daily Note     Today's date: 2020  Patient name: Paulette Stratton  : 1940  MRN: [id number]  Referring provider: Tony Celaya MD  Dx:   Encounter Diagnosis     ICD-10-CM    1  Closed fracture of left hip with routine healing, subsequent encounter S72 002D                   Subjective: See RE      Objective: See treatment diary below      Assessment: See RE      Plan: See RE     Precautions: Falls, confusion  Discharge Date    Date     Visit Count 11 12 13 14    FOTO        Pain In        Pain Out                                                                           Vitals        Manuals                        Neuro Re-Ed        Rhom  1x30" on floor  2x30" on foam 2x30" on floor EO 0H  EC 1H  2x30" on foam 1H     Tandem  Modified tandem on floor 3x30" ea On floor 1H :30x2     SLS        Static heel raises 60" x 2  2 HHA                               Ther Ex        Aerobic Nustep  L3 10 min   w/UE Nustep L3  10' w/ UE Nustep L3  10' w/ UE Nustep L3  10' w/ UE    SLR x 3  Stand  Foam  2x10 5"  Target  1 HHA Standing on foam   2x10 //bars  Standing on foam   2x10 //bars  B/L Standing on foam   2x10 //bars  B/L    HR/TR Stand  Foam  2x10 5"  Target  1 HHA Stand  Foam  2x10 5"    No foam for TR Stand  Foam  2x10 5" HR    No foam for TR 2x10 Standing on foam   2x10 //bars  B/L    Mini Squats Leg Press  10#  1x10  15#  2x10 Leg Press 10#  1x10    minisquats 2x10  declined      minisquats 2x10  Standing on foam   2x10 //bars  B/L    LAQ Machine  3x10  11# extn       Bridge        *seated March  Seated marches 2x10, 5"  Seated marches 2x10, 5"      Stairs    4x3 with single HR and SPC  supervision                    Ther Activity        ADL suite training Floor transfers - 8 mins to/from floor with VCs only               Gait Training        SPC Amb with SPC outdoors inclines/declins, grass, curb with cl S to min A  amb with SPC  80 ft x1 close supervision Paced walking with RW and SPC, level surfaces, supervision with pivots using SPC  Obstacle negotiation using SPC  15 mins    Stairs 4 steps, SPC and HR with supervision, 3 trials       Modalities                          Pt was instructed to verbalize any pain/discomfort to PT during/following tx   6/1/20

## 2020-07-14 ENCOUNTER — OFFICE VISIT (OUTPATIENT)
Dept: PHYSICAL THERAPY | Facility: CLINIC | Age: 80
End: 2020-07-14
Payer: COMMERCIAL

## 2020-07-14 DIAGNOSIS — S72.002D CLOSED FRACTURE OF LEFT HIP WITH ROUTINE HEALING, SUBSEQUENT ENCOUNTER: Primary | ICD-10-CM

## 2020-07-14 PROCEDURE — 97116 GAIT TRAINING THERAPY: CPT | Performed by: PHYSICAL THERAPIST

## 2020-07-14 PROCEDURE — 97110 THERAPEUTIC EXERCISES: CPT | Performed by: PHYSICAL THERAPIST

## 2020-07-16 ENCOUNTER — OFFICE VISIT (OUTPATIENT)
Dept: PHYSICAL THERAPY | Facility: CLINIC | Age: 80
End: 2020-07-16
Payer: COMMERCIAL

## 2020-07-16 DIAGNOSIS — S72.002D CLOSED FRACTURE OF LEFT HIP WITH ROUTINE HEALING, SUBSEQUENT ENCOUNTER: Primary | ICD-10-CM

## 2020-07-16 PROCEDURE — 97140 MANUAL THERAPY 1/> REGIONS: CPT | Performed by: PHYSICAL THERAPIST

## 2020-07-16 PROCEDURE — 97116 GAIT TRAINING THERAPY: CPT | Performed by: PHYSICAL THERAPIST

## 2020-07-16 PROCEDURE — 97112 NEUROMUSCULAR REEDUCATION: CPT | Performed by: PHYSICAL THERAPIST

## 2020-07-16 PROCEDURE — 97110 THERAPEUTIC EXERCISES: CPT | Performed by: PHYSICAL THERAPIST

## 2020-07-16 NOTE — PROGRESS NOTES
Daily Note     Today's date: 2020  Patient name: Sonia Condon  : 1940  MRN: [id number]  Referring provider: Taylor Murrieta MD  Dx:   Encounter Diagnosis     ICD-10-CM    1  Closed fracture of left hip with routine healing, subsequent encounter S72 002D                   Subjective: Patient reports she is very stiff and tight today  Not doing much at home yet, but daughter reports she is making her do more  Objective: See treatment diary below      Assessment: Tolerated treatment well  Patient demonstrated fatigue post treatment and would benefit from continued PT  Patient with increased deficits noted with hip discomfort and flexibility  Patient responded well to MT this date  Improved step length and improved foot clearance after MT  Does require max VCs and intermittent min A for curb negotiation  Plan: Continue per plan of care  Precautions: Falls, confusion  Discharge Date: 2020    Date        Visit Count 16       FOTO        Pain In 3       Pain Out 0                                                                          Vitals        Manuals HS, heel cord, ITB, piriformis stretch B/L Hip flexor stretch to tolerance at EOB  10 mins                       Neuro Re-Ed        Rhom        Tandem        SLS        Static heel raises                                Ther Ex        Aerobic Nustep L3  10' w/ UE       SLR x 3  Standing on foam   2x10 //bars  B/L       HR/TR Standing on foam   2x10 //bars  B/L       Mini Squats Standing on foam   2x10 //bars  B/L       LAQ        Bridge        *seated March        Stairs                        Ther Activity        ADL suite training                Gait Training        SPC Amb outdoors on incles/declines, curbs, uneven surfaces, grass using SPC with CGA to mod A   Total time 15 min       Stairs        Modalities                          Pt was instructed to verbalize any pain/discomfort to PT during/following tx   20

## 2020-07-21 ENCOUNTER — OFFICE VISIT (OUTPATIENT)
Dept: PHYSICAL THERAPY | Facility: CLINIC | Age: 80
End: 2020-07-21
Payer: COMMERCIAL

## 2020-07-21 DIAGNOSIS — S72.002D CLOSED FRACTURE OF LEFT HIP WITH ROUTINE HEALING, SUBSEQUENT ENCOUNTER: Primary | ICD-10-CM

## 2020-07-21 PROCEDURE — 97110 THERAPEUTIC EXERCISES: CPT

## 2020-07-21 PROCEDURE — 97112 NEUROMUSCULAR REEDUCATION: CPT

## 2020-07-21 PROCEDURE — 97116 GAIT TRAINING THERAPY: CPT

## 2020-07-21 PROCEDURE — 97140 MANUAL THERAPY 1/> REGIONS: CPT

## 2020-07-21 NOTE — PROGRESS NOTES
Daily Note     Today's date: 2020  Patient name: Linsey Brizuela  : 1940  MRN: [id number]  Referring provider: Js Montemayor MD  Dx:   Encounter Diagnosis     ICD-10-CM    1  Closed fracture of left hip with routine healing, subsequent encounter S72 002D                   Subjective: I have been walking more with a SPC  No falls but I do feel unsteady  Rknee has been bothering me more lately         Objective: See treatment diary below      Assessment: Tolerated treatment well  Pt encourage to verbalize increase pain sx's L hip/R knee  Pt easily distracted frequent cues for staying on task/counting reps, proper form / techique  Pt ambulates with decrease ground clearance, stride length, chacho  1* focus with gait training > step stride and decrease steps with turns  Patient would benefit from continued PT      Plan: Continue per plan of care        Precautions: Falls, confusion  Discharge Date: 2020    Date       Visit Count 16 17      FOTO        Pain In 3 L hip 0  R knee 5      Pain Out 0                                                                          Vitals        Manuals HS, heel cord, ITB, piriformis stretch B/L Hip flexor stretch to tolerance at EOB  10 mins HS, heel cord, ITB, piriformis stretch B/L Hip flexor stretch to tolerance at EOB  10 mins                      Neuro Re-Ed        Rhom  Foam  EO/EC  30" ea  CS/CGA      Tandem  Firm  EO  2x 30" ea dir  CGA      SLS  3 attempts pt co's LBP      Static heel raises                                Ther Ex        Aerobic Nustep L3  10' w/ UE Nustep L3  10' w/ UE      SLR x 3  Standing on foam   2x10 //bars  B/L Standing on foam   2x10 //bars  B/L      HR/TR Standing on foam   2x10 //bars  B/L Standing on foam   2x10 //bars  B/L      Mini Squats Standing on foam   2x10 //bars  B/L Standing on foam   2x10 //bars  B/L  cues      LAQ        Bridge        *seated March        Stairs   *                     Ther Activity ADL suite training                Gait Training        SPC Amb outdoors on incles/declines, curbs, uneven surfaces, grass using SPC with CGA to mod A   Total time 15 min Amb outdoors on incles/declines, curbs, uneven surfaces, grass, functional/dynamic reach ground to waist on even/uneven surface  1* focus step stride/turns    SPC  Min Cues  CS/CGA  Total time 15 min w/o rest      Stairs        Modalities        MH                  Pt was instructed to verbalize any pain/discomfort to PT during/following tx  7/21/20

## 2020-07-23 ENCOUNTER — OFFICE VISIT (OUTPATIENT)
Dept: PHYSICAL THERAPY | Facility: CLINIC | Age: 80
End: 2020-07-23
Payer: COMMERCIAL

## 2020-07-23 DIAGNOSIS — S72.002D CLOSED FRACTURE OF LEFT HIP WITH ROUTINE HEALING, SUBSEQUENT ENCOUNTER: Primary | ICD-10-CM

## 2020-07-23 PROCEDURE — 97140 MANUAL THERAPY 1/> REGIONS: CPT

## 2020-07-23 PROCEDURE — 97110 THERAPEUTIC EXERCISES: CPT

## 2020-07-23 PROCEDURE — 97112 NEUROMUSCULAR REEDUCATION: CPT

## 2020-07-23 PROCEDURE — 97116 GAIT TRAINING THERAPY: CPT

## 2020-07-23 NOTE — PROGRESS NOTES
Daily Note     Today's date: 2020  Patient name: Alondra Pugh  : 1940  MRN: [id number]  Referring provider: Marialuisa Reeves MD  Dx:   Encounter Diagnosis     ICD-10-CM    1  Closed fracture of left hip with routine healing, subsequent encounter S72 002D                   Subjective: I am feeling dizzy today  L hip feeling good  Feeling safer walking with SPC      Objective: See treatment diary below      Assessment: Tolerated treatment well  Denied any increase left hip / right knee pain with progression of TE  Pt demonstrated increase SOB with BP /78, SPO2 92-98%  Pt provided cues for step stride , ground clearance with gait  AD placement with curb negotiation  Decrease chacho note with head turns/divided attention    Patient would benefit from continued PT      Plan: Continue per plan of care        Precautions: Falls, confusion  Discharge Date: 2020    Date      Visit Count 16 17      FOTO        Pain In 3 L hip 0  R knee 5 L hip 0  R knee 1-2     Pain Out 0                                                                          Vitals        Manuals HS, heel cord, ITB, piriformis stretch B/L Hip flexor stretch to tolerance at EOB  10 mins HS, heel cord, ITB, piriformis stretch B/L Hip flexor stretch to tolerance at EOB  10 mins HS, heel cord, ITB, piriformis stretch B/L Hip flexor stretch to tolerance at EOB  10 mins                     Neuro Re-Ed        Rhom  Foam  EO/EC  30" ea  CS/CGA resume     Tandem  Firm  EO  2x 30" ea dir  CGA resume     SLS  3 attempts pt co's LBP resume     Static heel raises                                Ther Ex        Aerobic Nustep L3  10' w/ UE Nustep L3  10' w/ UE Nustep L3  10' w/ UE     SLR x 3  Standing on foam   2x10 //bars  B/L Standing on foam   2x10 //bars  B/L Standing on foam   2x10 //bars  B/L     HR/TR Standing on foam   2x10 //bars  B/L Standing on foam   2x10 //bars  B/L Standing on foam   3x10 //bars  B/L     Mini Squats Standing on foam   2x10 //bars  B/L Standing on foam   2x10 //bars  B/L  cues Standing on foam   2x10 //bars  B/L  cues     LAQ        Bridge        *seated March        Stairs   3x mod cues  2 HR                     Ther Activity        ADL suite training                Gait Training        SPC Amb outdoors on incles/declines, curbs, uneven surfaces, grass using SPC with CGA to mod A   Total time 15 min Amb outdoors on incles/declines, curbs, uneven surfaces, grass, functional/dynamic reach ground to waist on even/uneven surface  1* focus step stride/turns    SPC  Min Cues  CS/CGA  Total time 15 min w/o rest Amb outdoors on incles/declines, curbs, uneven surfaces, grass, functional/dynamic reach ground to waist on even/uneven surface, head turns, scan environment  1* focus step stride/turns  15 min     Stairs        Modalities                          Pt was instructed to verbalize any pain/discomfort to PT during/following tx  7/21/20

## 2020-07-28 ENCOUNTER — OFFICE VISIT (OUTPATIENT)
Dept: PHYSICAL THERAPY | Facility: CLINIC | Age: 80
End: 2020-07-28
Payer: COMMERCIAL

## 2020-07-28 DIAGNOSIS — S72.002D CLOSED FRACTURE OF LEFT HIP WITH ROUTINE HEALING, SUBSEQUENT ENCOUNTER: Primary | ICD-10-CM

## 2020-07-28 PROCEDURE — 97116 GAIT TRAINING THERAPY: CPT

## 2020-07-28 PROCEDURE — 97112 NEUROMUSCULAR REEDUCATION: CPT

## 2020-07-28 PROCEDURE — 97110 THERAPEUTIC EXERCISES: CPT

## 2020-07-28 NOTE — PROGRESS NOTES
Daily Note     Today's date: 2020  Patient name: Reed Merino  : 1940  MRN: [id number]  Referring provider: Juan Miller MD  Dx:   Encounter Diagnosis     ICD-10-CM    1  Closed fracture of left hip with routine healing, subsequent encounter S72 002D                   Subjective: I am doing ok  I think I would like to be done with PT end of week      Objective: See treatment diary below      Assessment: Tolerated treatment well  Denied any increase pain L hip  Pt reports > pain/discomfort R knee  Pt requires mod cues with stair/curb negotiation  Pt lacks TKE BL LE  Pt indicates conts to be non-compliant with carryover with HEP  Pt encourage compliancy with potential DC HEP NV       Plan: Continue per plan of care        Precautions: Falls, confusion  Discharge Date: 2020    Date     Visit Count 16 17      FOTO        Pain In 3 L hip 0  R knee 5 L hip 0  R knee 1-2 L Hip 0  R knee 3-4    Pain Out 0                                                                          Vitals        Manuals HS, heel cord, ITB, piriformis stretch B/L Hip flexor stretch to tolerance at EOB  10 mins HS, heel cord, ITB, piriformis stretch B/L Hip flexor stretch to tolerance at EOB  10 mins HS, heel cord, ITB, piriformis stretch B/L Hip flexor stretch to tolerance at EOB  10 mins Held                    Neuro Re-Ed        Rhom  Foam  EO/EC  30" ea  CS/CGA resume Foam  EO/EC  1x 1 min    Tandem  Firm  EO  2x 30" ea dir  CGA resume Firm  EO  2x 30" ea dir  CGA    SLS  3 attempts pt co's LBP resume     Static heel raises                                Ther Ex        Aerobic Nustep L3  10' w/ UE Nustep L3  10' w/ UE Nustep L3  10' w/ UE Nustep L3  10' w/ UE    SLR x 3  Standing on foam   2x10 //bars  B/L Standing on foam   2x10 //bars  B/L Standing on foam   2x10 //bars  B/L Standing on foam   3x10 //bars  B/L    HR/TR Standing on foam   2x10 //bars  B/L Standing on foam   2x10 //bars  B/L Standing on foam   3x10 //bars  B/L Standing on foam   3x10 //bars  B/L    Mini Squats Standing on foam   2x10 //bars  B/L Standing on foam   2x10 //bars  B/L  cues Standing on foam   2x10 //bars  B/L  cues Standing on foam   2x10 //bars  B/L  cues    LAQ        Bridge        *seated March        Stairs   3x mod cues  2 HR 3x mod cues  2 HR  Step over step    Sit/Stand    3x 5 reps  >15 sec              Ther Activity        ADL suite training                Gait Training        SPC Amb outdoors on incles/declines, curbs, uneven surfaces, grass using SPC with CGA to mod A   Total time 15 min Amb outdoors on incles/declines, curbs, uneven surfaces, grass, functional/dynamic reach ground to waist on even/uneven surface  1* focus step stride/turns    SPC  Min Cues  CS/CGA  Total time 15 min w/o rest Amb outdoors on incles/declines, curbs, uneven surfaces, grass, functional/dynamic reach ground to waist on even/uneven surface, head turns, scan environment  1* focus step stride/turns  15 min Amb outdoors on incles/declines, curbs, uneven surfaces, grass, functional/dynamic reach ground to waist on even/uneven surface, head turns, scan environment  1* focus step stride/turns  15 min    Stairs        Modalities                          Pt was instructed to verbalize any pain/discomfort to PT during/following tx  7/21/20

## 2020-07-30 ENCOUNTER — EVALUATION (OUTPATIENT)
Dept: PHYSICAL THERAPY | Facility: CLINIC | Age: 80
End: 2020-07-30
Payer: COMMERCIAL

## 2020-07-30 ENCOUNTER — APPOINTMENT (OUTPATIENT)
Dept: PHYSICAL THERAPY | Facility: CLINIC | Age: 80
End: 2020-07-30
Payer: COMMERCIAL

## 2020-07-30 DIAGNOSIS — S72.002D CLOSED FRACTURE OF LEFT HIP WITH ROUTINE HEALING, SUBSEQUENT ENCOUNTER: Primary | ICD-10-CM

## 2020-07-30 PROCEDURE — 97110 THERAPEUTIC EXERCISES: CPT

## 2020-07-30 PROCEDURE — 97140 MANUAL THERAPY 1/> REGIONS: CPT

## 2020-07-30 NOTE — PROGRESS NOTES
PT Discharge    Today's date: 2020  Patient name: Shay Ribeiro  : 1940  MRN: [id number]  Referring provider: Tahira Maravilla MD  Dx:   Encounter Diagnosis     ICD-10-CM    1  Closed fracture of left hip with routine healing, subsequent encounter S72 002D                   Assessment  Assessment details: Shay Ribeiro is a 70 y o  female presenting to outpatient physical therapy with diagnosis of a closed fracture of left hip s/p left hip IM nail  Patient has made little progress since last assessment period  She continues to rely on her daughter to help complete majority of household activities at this time  Close supervision for washing and dressing  Continued use of SPC but with instability and fear of falling noted  Pt to be D/C from OPPT due to plateau in progression  She was advised to follow up with physician with any further concerns at this time  Thank you for this referral    Impairments: abnormal coordination, abnormal gait, abnormal or restricted ROM, abnormal movement, activity intolerance, impaired balance, impaired physical strength and lacks appropriate home exercise program  Barriers to therapy: Patient does not drive  Patient lacks confidence with SPC  Understanding of Dx/Px/POC: good   Prognosis: good    Goals  STGs to be achieved in 4 weeks:  1  Pt to demonstrate reduced subjective pain rating "at worst" by at least 2-3 points from Initial Eval in order to allow for reduced pain with ADLs and improved functional activity tolerance - MET  2  Pt to demonstrate increased AROM of left HS by at least 5-10 degrees in order to allow for greater ease and independence with ADLs and functional mobility - MET  3  Pt to demonstrate full PROM of left hip in order to maximize joint mobility and function and allow for progression of exercise program and achievement of goals - MET  4   Pt to demonstrate increased MMT of left LE by at least 1/2-1 grade in order to improve safety and stability with ADLs and functional mobility - MET    LTGs to be achieved in 6-8 weeks:  1  Pt will be I with HEP in order to continue to improve quality of life and independence and reduce risk for re-injury - NOT MET 2ed motivation   2  Pt to demonstrate return to community ambulation using least restrictive AD without limitations or restrictions - NOT MET 2/2 confidence  3  Pt to demonstrate improved function as noted by achieving or exceeding predicted score on FOTO outcomes assessment tool- MET/2 decreas    Plan  Plan details: Pt to be D/C from OPPT   Treatment plan discussed with: patient and family        Subjective Evaluation    History of Present Illness  Date of onset: 11/14/2019  Date of surgery: 11/15/2019  Mechanism of injury: surgery and trauma  Mechanism of injury: UPDATE:  Patient continues to walk with [facility] only when made to by family  She demonstrates decreased motivation to return to PLOF  Daughter reports HEP at times, not consistently  reports continued use of RW as primary ambulation device  Discussed HEP compliance  Daughter requests another week to have better understanding of what her mother will be working on at home  Does note increased discomfort in left hip/back  Reports more sedentary as well  Patient reports improved ambulation  Able to walk with [facility], but doesn't feel confident  She notes no falls, no near misses  Reports doing her HEP "sometimes"  Daughter reports she does her HEP, but not as regularly as she should  Notes that she does encourage her mother, but mom doesn't like exercises  Overall, slowly returning to ADLs and housekeeping  Still limited  S/p fall on 11/14/2019, status post intramedullary nail of the left femur subtrochanteric fracture on November 15th, 2019  S/p hospital stay and then transfer to SNF for 20 days of rehab  She was then discharged home with her family and had home PT  She had a follow up with Dr Tarik Mitchell and was referred to [facility]      Ongoing deficits:  Intermittent dizziness  Intermittent pain at 4-10  Has not returned to driving  Standing to do dishes  Has returned to cooking or baking (minimally)  Has returned to cleaning (minimally)   Has not returned to standing for her entire shower  Has not returned to grocery shopping  Has not returned to negotiation of stairs  Does not feel confident with using cane  Pain  Current pain ratin  At best pain ratin  At worst pain ratin  Location: Left buttocks  Quality: knife-like, pressure and dull ache    Social Support  Steps to enter house: no  Stairs in house: no   Lives in: trailer  Lives with: adult children (Daughter and CITLALLI)    Employment status: not working  Hand dominance: right    Treatments  Previous treatment: physical therapy and medication  Current treatment: physical therapy  Patient Goals  Patient goals for therapy: increased strength, independence with ADLs/IADLs, return to sport/leisure activities, improved balance and decreased pain          Objective     Observations   Left Hip  Positive for deformity  Additional Observation Details  Left knee deformity lacking knee extension (+) left knee valgus    Lumbar Screen  Lumbar range of motion within normal limits with the following exceptions:Denies h/o LBP    Neurological Testing     Sensation     Hip   Left Hip   Intact: light touch and proprioception    Active Range of Motion   Left Hip   Flexion: 105 degrees   Extension: 30 degrees   Abduction: 45 degrees     Passive Range of Motion     Additional Passive Range of Motion Details  ROM left and right hips equal at this time    Strength/Myotome Testing     Left Hip   Planes of Motion   Flexion: 3+  Extension: 3+  Abduction: 3+  Adduction: 3+    Additional Strength Details  Left   Knee extension - 4/5   Knee flexion - 4/5  (+) crepitus  (+) tenderness per patient report from prior accident    Right   Knee extension: 4/5  Knee flexion: 4/5    **Questionable effort with MMT this date  Patient states "I just don't want to, I'm just old now"  General Comments:      Hip Comments   Gait:  RW with steady gait  SPC, hesitates on uneven surfaces to include inclines and grass surfaces  She requires encouragement during times she uses SPC      TUG RW: 19 sec  TUG SPC: 30 sec    EO Rhom: 30 sec foam  EC Rhom: 30 sec foam    TUG RW: 17 sec  TUG SPC: 24 sec  Max encouragement in both trials    EO and EC Rhom 30 sec foam  Declines to attempt tandem or SLS

## 2020-07-30 NOTE — PROGRESS NOTES
Daily Note     Today's date: 2020  Patient name: Nitza Rubio  : 1940  MRN: [id number]  Referring provider: Hazel Hernandez MD  Dx:   Encounter Diagnosis     ICD-10-CM    1  Closed fracture of left hip with routine healing, subsequent encounter S72 002D                   Subjective: See DC Summary      Objective: See treatment diary below      Assessment: Tolerated treatment well   Denied any increase L Hip pain  Continues to report 1* co's of pain in R knee with pt encourage to fu with MD  Patient demonstrated fatigue post treatment  Reviewed HEP with all pt's questions addressed prior to DC    See DC summary for additional findings    Plan: DC to HEP     Precautions: Falls, confusion  Discharge Date: 2020    Date    Visit Count 16 17   20   FOTO        Pain In 3 L hip 0  R knee 5 L hip 0  R knee 1-2 L Hip 0  R knee 3-4 L hip 0  R 7-8   Pain Out 0    same                                                                      Vitals        Manuals HS, heel cord, ITB, piriformis stretch B/L Hip flexor stretch to tolerance at EOB  10 mins HS, heel cord, ITB, piriformis stretch B/L Hip flexor stretch to tolerance at EOB  10 mins HS, heel cord, ITB, piriformis stretch B/L Hip flexor stretch to tolerance at EOB  10 mins Held HS, heel cord, ITB, piriformis stretch B/L Hip flexor stretch to tolerance at EOB  15 mins                   Neuro Re-Ed        Rhom  Foam  EO/EC  30" ea  CS/CGA resume Foam  EO/EC  1x 1 min    Tandem  Firm  EO  2x 30" ea dir  CGA resume Firm  EO  2x 30" ea dir  CGA    SLS  3 attempts pt co's LBP resume     Static heel raises                                Ther Ex        Aerobic Nustep L3  10' w/ UE Nustep L3  10' w/ UE Nustep L3  10' w/ UE Nustep L3  10' w/ UE Nustep L3  10' w/ UE   SLR x 3  Standing on foam   2x10 //bars  B/L Standing on foam   2x10 //bars  B/L Standing on foam   2x10 //bars  B/L Standing on foam   3x10 //bars  B/L Standing on foam   3x10 //bars  B/L   HR/TR Standing on foam   2x10 //bars  B/L Standing on foam   2x10 //bars  B/L Standing on foam   3x10 //bars  B/L Standing on foam   3x10 //bars  B/L Standing on foam   3x10 //bars  B/L   Mini Squats Standing on foam   2x10 //bars  B/L Standing on foam   2x10 //bars  B/L  cues Standing on foam   2x10 //bars  B/L  cues Standing on foam   2x10 //bars  B/L  cues Standing on foam   2x10 //bars  B/L  cues   LAQ     Review  10x 5" BL   Bridge        *seated March        Stairs   3x mod cues  2 HR 3x mod cues  2 HR  Step over step Not avail   Sit/Stand    3x 5 reps  >15 sec   5x 12 sec           Ther Activity        ADL suite training                Gait Training        SPC Amb outdoors on incles/declines, curbs, uneven surfaces, grass using SPC with CGA to mod A   Total time 15 min Amb outdoors on incles/declines, curbs, uneven surfaces, grass, functional/dynamic reach ground to waist on even/uneven surface  1* focus step stride/turns    SPC  Min Cues  CS/CGA  Total time 15 min w/o rest Amb outdoors on incles/declines, curbs, uneven surfaces, grass, functional/dynamic reach ground to waist on even/uneven surface, head turns, scan environment  1* focus step stride/turns  15 min Amb outdoors on incles/declines, curbs, uneven surfaces, grass, functional/dynamic reach ground to waist on even/uneven surface, head turns, scan environment  1* focus step stride/turns  15 min    Stairs        Modalities                          Pt was instructed to verbalize any pain/discomfort to PT during/following tx  7/21/20

## 2020-08-21 ENCOUNTER — TRANSCRIBE ORDERS (OUTPATIENT)
Dept: ADMINISTRATIVE | Facility: HOSPITAL | Age: 80
End: 2020-08-21

## 2020-08-21 DIAGNOSIS — M81.0 AGE-RELATED OSTEOPOROSIS WITHOUT CURRENT PATHOLOGICAL FRACTURE: Primary | ICD-10-CM

## 2020-09-02 ENCOUNTER — HOSPITAL ENCOUNTER (OUTPATIENT)
Dept: BONE DENSITY | Facility: HOSPITAL | Age: 80
Discharge: HOME | End: 2020-09-02
Payer: COMMERCIAL

## 2020-09-02 DIAGNOSIS — M81.0 AGE-RELATED OSTEOPOROSIS WITHOUT CURRENT PATHOLOGICAL FRACTURE: ICD-10-CM

## 2020-09-02 PROCEDURE — 77080 DXA BONE DENSITY AXIAL: CPT

## 2021-06-15 ENCOUNTER — TRANSCRIBE ORDERS (OUTPATIENT)
Dept: PHYSICAL THERAPY | Facility: CLINIC | Age: 81
End: 2021-06-15

## 2021-06-15 ENCOUNTER — EVALUATION (OUTPATIENT)
Dept: PHYSICAL THERAPY | Facility: CLINIC | Age: 81
End: 2021-06-15
Payer: COMMERCIAL

## 2021-06-15 DIAGNOSIS — M54.50 LOIN PAIN-HEMATURIA SYNDROME: Primary | ICD-10-CM

## 2021-06-15 DIAGNOSIS — M25.552 LEFT HIP PAIN: ICD-10-CM

## 2021-06-15 DIAGNOSIS — R31.9 LOIN PAIN-HEMATURIA SYNDROME: Primary | ICD-10-CM

## 2021-06-15 DIAGNOSIS — M54.50 CHRONIC LEFT-SIDED LOW BACK PAIN WITHOUT SCIATICA: Primary | ICD-10-CM

## 2021-06-15 DIAGNOSIS — G89.29 CHRONIC LEFT-SIDED LOW BACK PAIN WITHOUT SCIATICA: Primary | ICD-10-CM

## 2021-06-15 PROCEDURE — 97162 PT EVAL MOD COMPLEX 30 MIN: CPT

## 2021-06-15 PROCEDURE — 97112 NEUROMUSCULAR REEDUCATION: CPT

## 2021-06-15 NOTE — PROGRESS NOTES
PT Evaluation     Today's date: 6/15/21  Patient name: Sonia Condon  : 1940  MRN: [id number]  Referring provider: Lidya Kincaid DO  Dx:   Encounter Diagnosis     ICD-10-CM    1  Chronic left-sided low back pain without sciatica  M54 5     G89 29    2  Left hip pain  M25 552                   Assessment  Assessment details: Sonia Condon is a 68 y o  female presenting to outpatient physical therapy with diagnosis of Chronic left-sided low back pain without sciatica   Left hip pain  Patient's current impairments include L hip and low back pain, impaired soft tissue mobility, reduced L hip range of motion, reduced LLE trength, reduced postural awareness, and reduced activity tolerance  Patient's present functional limitations include difficulty with ADLs with increased need for assistance, reliance on medication and/or modalities for pain relief, poor tolerance for functional mobility and activity, and difficulty completing [place]  responsibilities  Patient to benefit from skilled outpatient physical therapy 1 /week for 8  weeks in order to reduce pain, maximize pain free range of motion, increase strength and stability, and improve functional mobility/functional activity in order to maximize return to prior level of function with reduced limitations  Thank you for your referral     Impairments: abnormal gait, abnormal or restricted ROM, activity intolerance, impaired physical strength, lacks appropriate home exercise program and pain with function  Understanding of Dx/Px/POC: good   Prognosis: good    Goals  STGs to be achieved in 4 weeks:  1  Pt to demonstrate reduced subjective pain rating "at worst" by at least 2-3 points from Initial Eval in order to allow for reduced pain with ADLs and improved functional activity tolerance  2  Pt to demonstrate increased AROM of L hip by at least 5-10 degrees in order to allow for greater ease and independence with ADLs and functional mobility      3  Pt to demonstrate increased MMT of L hip and knee  by at least 1/2-1 grade in order to improve safety and stability with ADLs and functional mobility  LTGs to be achieved in 6-8 weeks:  1  Pt will be I with HEP in order to continue to improve quality of life and independence and reduce risk for re-injury  2  Pt to demonstrate return to 20 min of standing activity before requiring a seated rest    3  Pt to demonstrate improved function as noted by achieving or exceeding predicted score on FOTO outcomes assessment tool  Plan  Plan details: Pt states she can only attend PT 1x/week due to transportation issues  Patient would benefit from: skilled physical therapy  Planned therapy interventions: manual therapy, neuromuscular re-education, therapeutic exercise, therapeutic activities, stretching, strengthening and home exercise program  Frequency: 1x week  Duration in weeks: 8  Plan of Care beginning date: 6/15/2021  Plan of Care expiration date: 8/10/2021  Treatment plan discussed with: patient        Subjective Evaluation    History of Present Illness  Mechanism of injury: Pt arrived to PT amb with [facility] stating she is coming to PT because of her L hip pain and back pain  Pt notes L hip pain is intermittent with certain mvmts  Pt notes she is mostly sedentary, not driving much  Watches TV most of the day  Pt notes occ LBP , usually on the L side occurring with prolonged standing  Pt has diff donning clothes, takes a long time to do things  Pt has diff standing at sink to wash dishes  Pt mentioned she has had memory issues ever since her L hip surgery 2 years ago            Recurrent probem    Quality of life: good    Pain  Current pain ratin  At best pain ratin  At worst pain rating: 10  Quality: knife-like and sharp  Relieving factors: change in position and medications (salon pas)  Aggravating factors: standing and walking  Progression: no change    Social Support  Steps to enter house: no (ramp)  Stairs in house: no   Lives in: trailer  Lives with: adult children    Employment status: not working  Treatments  Previous treatment: physical therapy  Current treatment: physical therapy  Patient Goals  Patient goals for therapy: increased motion, improved balance and increased strength  Patient goal: improve mobility        Objective     Observations   Left Hip  Positive for deformity  Additional Observation Details  Genu valgus L    Tenderness     Left Hip   Tenderness in the greater trochanter  Lumbar Screen  Lumbar range of motion within normal limits with the following exceptions:WFLs    Neurological Testing     Additional Neurological Details  occ episodes of feet feeling like they are asleep    Active Range of Motion   Left Hip   Flexion: 90 degrees   Extension: 10 degrees   Abduction: 30 degrees     Strength/Myotome Testing     Left Hip   Planes of Motion   Flexion: 4-  Extension: 3+  Abduction: 3+  Adduction: 4    Right Hip   Planes of Motion   Flexion: 4-    Additional Strength Details  L knee ext 4/5  L knee flex 4-/5    Tests     Left Hip   Positive VILMA       Additional Tests Details  5TSTS test with UEs = 17 1 sec  TUG test with SPC = 18 7 sec  TUG with divided attention with SPC =22 sec              Re-eval Date: 7/15/21    Date 6/15       Visit Count 1       FOTO Not completed       Pain In See IE       Pain Out See IE               Manuals        Ham stretch        pirif stretch        ITB                Neuro Re-Ed                                                                Ther Ex        Nustep        Leg press        Leg ext mach        Leg flex mach        Step ups  Fwd/lat        Heel slides        Hip abd w/TB        Hip add squeezes        SKTC         LTRs        bridges        seted fwd trunk stretch        Gait Training        treadmill                Modalities

## 2021-06-15 NOTE — LETTER
2021    [facility]    Patient: Hi Jesus   YOB: 1940   Date of Visit: 6/15/2021     Encounter Diagnosis     ICD-10-CM    1  Chronic left-sided low back pain without sciatica  M54 5     G89 29    2  Left hip pain  M25 552        Dear Dr Trell Barksdale:    Thank you for your recent referral of Hi Jesus  Please review the attached evaluation summary from Urszula's recent visit  Please verify that you agree with the plan of care by signing the attached order  If you have any questions or concerns, please do not hesitate to call  I sincerely appreciate the opportunity to share in the care of one of your patients and hope to have another opportunity to work with you in the near future  Sincerely,    Suzanne Buck, PT      Referring Provider:      I certify that I have read the below Plan of Care and certify the need for these services furnished under this plan of treatment while under my care  Monet Franco DO  [address] [place] [address] [place] [address] [address]  Via Fax: [phone]          PT Evaluation     Today's date: 6/15/21  Patient name: Hi Jesus  : 1940  MRN: [id number]  Referring provider: Johnnie Zamudio DO  Dx:   Encounter Diagnosis     ICD-10-CM    1  Chronic left-sided low back pain without sciatica  M54 5     G89 29    2  Left hip pain  M25 552                   Assessment  Assessment details: Hi Jesus is a 68 y o  female presenting to outpatient physical therapy with diagnosis of Chronic left-sided low back pain without sciatica   Left hip pain  Patient's current impairments include L hip and low back pain, impaired soft tissue mobility, reduced L hip range of motion, reduced LLE trength, reduced postural awareness, and reduced activity tolerance   Patient's present functional limitations include difficulty with ADLs with increased need for assistance, reliance on medication and/or modalities for pain relief, poor tolerance for functional mobility and activity, and difficulty completing [place]  responsibilities  Patient to benefit from skilled outpatient physical therapy 1 /week for 8  weeks in order to reduce pain, maximize pain free range of motion, increase strength and stability, and improve functional mobility/functional activity in order to maximize return to prior level of function with reduced limitations  Thank you for your referral     Impairments: abnormal gait, abnormal or restricted ROM, activity intolerance, impaired physical strength, lacks appropriate home exercise program and pain with function  Understanding of Dx/Px/POC: good   Prognosis: good    Goals  STGs to be achieved in 4 weeks:  1  Pt to demonstrate reduced subjective pain rating "at worst" by at least 2-3 points from Initial Eval in order to allow for reduced pain with ADLs and improved functional activity tolerance  2  Pt to demonstrate increased AROM of L hip by at least 5-10 degrees in order to allow for greater ease and independence with ADLs and functional mobility  3  Pt to demonstrate increased MMT of L hip and knee  by at least 1/2-1 grade in order to improve safety and stability with ADLs and functional mobility  LTGs to be achieved in 6-8 weeks:  1  Pt will be I with HEP in order to continue to improve quality of life and independence and reduce risk for re-injury  2  Pt to demonstrate return to 20 min of standing activity before requiring a seated rest    3  Pt to demonstrate improved function as noted by achieving or exceeding predicted score on FOTO outcomes assessment tool  Plan  Plan details: Pt states she can only attend PT 1x/week due to transportation issues    Patient would benefit from: skilled physical therapy  Planned therapy interventions: manual therapy, neuromuscular re-education, therapeutic exercise, therapeutic activities, stretching, strengthening and home exercise program  Frequency: 1x week  Duration in weeks: 8  Plan of Care beginning date: 6/15/2021  Plan of Care expiration date: 8/10/2021  Treatment plan discussed with: patient        Subjective Evaluation    History of Present Illness  Mechanism of injury: Pt arrived to PT amb with [facility] stating she is coming to PT because of her L hip pain and back pain  Pt notes L hip pain is intermittent with certain mvmts  Pt notes she is mostly sedentary, not driving much  Watches TV most of the day  Pt notes occ LBP , usually on the L side occurring with prolonged standing  Pt has diff donning clothes, takes a long time to do things  Pt has diff standing at sink to wash dishes  Pt mentioned she has had memory issues ever since her L hip surgery 2 years ago  Recurrent probem    Quality of life: good    Pain  Current pain ratin  At best pain ratin  At worst pain rating: 10  Quality: knife-like and sharp  Relieving factors: change in position and medications (salon pas)  Aggravating factors: standing and walking  Progression: no change    Social Support  Steps to enter house: no (ramp)  Stairs in house: no   Lives in: trailer  Lives with: adult children    Employment status: not working  Treatments  Previous treatment: physical therapy  Current treatment: physical therapy  Patient Goals  Patient goals for therapy: increased motion, improved balance and increased strength  Patient goal: improve mobility        Objective     Observations   Left Hip  Positive for deformity  Additional Observation Details  Genu valgus L    Tenderness     Left Hip   Tenderness in the greater trochanter       Lumbar Screen  Lumbar range of motion within normal limits with the following exceptions:WFLs    Neurological Testing     Additional Neurological Details  occ episodes of feet feeling like they are asleep    Active Range of Motion   Left Hip   Flexion: 90 degrees   Extension: 10 degrees   Abduction: 30 degrees Strength/Myotome Testing     Left Hip   Planes of Motion   Flexion: 4-  Extension: 3+  Abduction: 3+  Adduction: 4    Right Hip   Planes of Motion   Flexion: 4-    Additional Strength Details  L knee ext 4/5  L knee flex 4-/5    Tests     Left Hip   Positive VILMA       Additional Tests Details  5TSTS test with UEs = 17 1 sec  TUG test with SPC = 18 7 sec  TUG with divided attention with SPC =22 sec              Re-eval Date: 7/15/21    Date 6/15       Visit Count 1       FOTO Not completed       Pain In See IE       Pain Out See IE               Manuals        Ham stretch        pirif stretch        ITB                Neuro Re-Ed                                                                Ther Ex        Nustep        Leg press        Leg ext mach        Leg flex mach        Step ups  Fwd/lat        Heel slides        Hip abd w/TB        Hip add squeezes        SKTC         LTRs        bridges        seted fwd trunk stretch        Gait Training        treadmill                Modalities

## 2021-06-22 ENCOUNTER — APPOINTMENT (OUTPATIENT)
Dept: PHYSICAL THERAPY | Facility: CLINIC | Age: 81
End: 2021-06-22
Payer: COMMERCIAL

## 2021-06-29 ENCOUNTER — OFFICE VISIT (OUTPATIENT)
Dept: PHYSICAL THERAPY | Facility: CLINIC | Age: 81
End: 2021-06-29
Payer: COMMERCIAL

## 2021-06-29 DIAGNOSIS — M25.552 LEFT HIP PAIN: ICD-10-CM

## 2021-06-29 DIAGNOSIS — G89.29 CHRONIC LEFT-SIDED LOW BACK PAIN WITHOUT SCIATICA: Primary | ICD-10-CM

## 2021-06-29 DIAGNOSIS — M54.50 CHRONIC LEFT-SIDED LOW BACK PAIN WITHOUT SCIATICA: Primary | ICD-10-CM

## 2021-06-29 PROCEDURE — 97110 THERAPEUTIC EXERCISES: CPT

## 2021-06-29 NOTE — PROGRESS NOTES
Daily Note     Today's date: 2021  Patient name: Ernesto Vazquez  : 1940  MRN: [id number]  Referring provider: Rose Mary Araiza DO  Dx:   Encounter Diagnosis     ICD-10-CM    1  Chronic left-sided low back pain without sciatica  M54 5     G89 29    2  Left hip pain  M25 552        Start Time: 1200  Stop Time: 1245  Total time in clinic (min): 45 minutes    Subjective: "I did a lot of walking and stairs over the weekend  It was the most I did since I hurt myself and now my L hip is really hurting "      Objective: See treatment diary below      Assessment: Tolerated treatment fair  Increased L hip pain with all exercises  Pt needing consist monitoring for counting  Unable to lay in supine for extended period of time  Challenged with step up 2* L LE strength deficits; needing to lower to 4" to complete to avoid compensation of UE usage  Will continue to monitor and progress as able  Patient demonstrated fatigue post treatment and would benefit from continued PT      Plan: Continue per plan of care  Progress treatment as tolerated         Re-eval Date: 7/15/21    Date 6/15 6/29      Visit Count 1 2/4      FOTO Not completed       Pain In See IE 8/10      Pain Out See IE 8/10              Manuals        Ham stretch        pirif stretch        ITB                Neuro Re-Ed                                                                Ther Ex        Nustep  L3 10'      Leg press        Leg ext mach        Leg flex mach        Step ups  Fwd/lat  4" 2x10 fwd      Heel slides  2x10      Hip abd w/TB  grn   2x10/3-5"      Hip add squeezes  20x/3-5"      SKTC         LTRs  10x10"      bridges        seted fwd trunk stretch  3x30"      Gait Training        treadmill                Modalities

## 2021-07-06 ENCOUNTER — OFFICE VISIT (OUTPATIENT)
Dept: PHYSICAL THERAPY | Facility: CLINIC | Age: 81
End: 2021-07-06
Payer: COMMERCIAL

## 2021-07-06 DIAGNOSIS — M54.50 CHRONIC LEFT-SIDED LOW BACK PAIN WITHOUT SCIATICA: Primary | ICD-10-CM

## 2021-07-06 DIAGNOSIS — G89.29 CHRONIC LEFT-SIDED LOW BACK PAIN WITHOUT SCIATICA: Primary | ICD-10-CM

## 2021-07-06 DIAGNOSIS — M25.552 LEFT HIP PAIN: ICD-10-CM

## 2021-07-06 PROCEDURE — 97110 THERAPEUTIC EXERCISES: CPT

## 2021-07-06 NOTE — PROGRESS NOTES
Daily Note     Today's date: 2021  Patient name: Vickie Worley  : 1940  MRN: [id number]  Referring provider: Ana M Lozano MD  Dx:   Encounter Diagnosis     ICD-10-CM    1  Chronic left-sided low back pain without sciatica  M54 5     G89 29    2  Left hip pain  M25 552        Start Time: 1015  Stop Time: 1105  Total time in clinic (min): 50 minutes    Subjective: Patient states, "I don't have much pain today because I haven't done anything yet  I also did not sleep well last night"  Objective: See treatment diary below    Assessment: Patient lacks L hip strength and has difficulty completing tasks on that side  She quickly fatigued with assigned TE  Slight dizziness after transferring supine to sit  Cleared up within a minute  Progress as able  Plan: Continue per plan of care  Progress treatment as tolerated         Re-eval Date: 7/15/21    Date 6/15 6/29 7/6     Visit Count 1 2/4      FOTO Not completed       Pain In See IE 8/10 4-5/10     Pain Out See IE 8/10 4-510         Manuals        Ham stretch        pirif stretch        ITB                Neuro Re-Ed                                                                Ther Ex        Nustep  L3 10' L3 10'     Leg press        Leg ext mach        Leg flex mach        Step ups  Fwd/lat  4" 2x10 fwd 4" 2x10 fwd     Heel slides  2x10 2x10     Hip abd w/TB  grn   2x10/3-5" GTB  5"x20     Hip add squeezes  20x/3-5" 5"x20     SKTC      5"x10     LTRs  10x10" 10"x10     bridges   x10     seted fwd trunk stretch  3x30"   3x30"     Gait Training        treadmill                Modalities

## 2021-08-16 NOTE — PROGRESS NOTES
PT Discharge    Today's date: 8/15/21  Patient name: Marco Harris  : 1940  MRN: [id number]  Referring provider: April Obrien MD  Dx:   Encounter Diagnosis     ICD-10-CM    1  Chronic left-sided low back pain without sciatica  M54 5     G89 29    2  Left hip pain  M25 552        Start Time: 1015  Stop Time: 1105  Total time in clinic (min): 50 minutes    Assessment  Assessment details: Maggie Valencia will be discharged from outpatient physical therapy care due to expiration of plan of care  No objective measures updated, Maggie Valencia is not present at time of discharge  Impairments: abnormal gait, abnormal or restricted ROM, activity intolerance, impaired physical strength, lacks appropriate home exercise program and pain with function  Understanding of Dx/Px/POC: good   Prognosis: good    Goals  STGs to be achieved in 4 weeks:  1  Pt to demonstrate reduced subjective pain rating "at worst" by at least 2-3 points from Initial Eval in order to allow for reduced pain with ADLs and improved functional activity tolerance  2  Pt to demonstrate increased AROM of L hip by at least 5-10 degrees in order to allow for greater ease and independence with ADLs and functional mobility  3  Pt to demonstrate increased MMT of L hip and knee  by at least 1/2-1 grade in order to improve safety and stability with ADLs and functional mobility  LTGs to be achieved in 6-8 weeks:  1  Pt will be I with HEP in order to continue to improve quality of life and independence and reduce risk for re-injury  2  Pt to demonstrate return to 20 min of standing activity before requiring a seated rest    3  Pt to demonstrate improved function as noted by achieving or exceeding predicted score on FOTO outcomes assessment tool  Pt goals not formally addressed due to self Dc from PT  Please see last progress note for status  Plan  Plan details: Pt stated she can only attend PT 1x/week due to transportation issues    DC PT due to noncompliance with apptmts        Subjective Evaluation    History of Present Illness  Mechanism of injury: Pt arrived to PT amb with [facility] stating she is coming to PT because of her L hip pain and back pain  Pt notes L hip pain is intermittent with certain mvmts  Pt notes she is mostly sedentary, not driving much  Watches TV most of the day  Pt notes occ LBP , usually on the L side occurring with prolonged standing  Pt has diff donning clothes, takes a long time to do things  Pt has diff standing at sink to wash dishes  Pt mentioned she has had memory issues ever since her L hip surgery 2 years ago  Recurrent probem    Quality of life: good    Pain  Current pain ratin  At best pain ratin  At worst pain rating: 10  Quality: knife-like and sharp  Relieving factors: change in position and medications (salon pas)  Aggravating factors: standing and walking  Progression: no change    Social Support  Steps to enter house: no (ramp)  Stairs in house: no   Lives in: trailer  Lives with: adult children    Employment status: not working  Treatments  Previous treatment: physical therapy  Current treatment: physical therapy  Patient Goals  Patient goals for therapy: increased motion, improved balance and increased strength  Patient goal: improve mobility        Objective     Observations   Left Hip  Positive for deformity  Additional Observation Details  Genu valgus L    Tenderness     Left Hip   Tenderness in the greater trochanter       Lumbar Screen  Lumbar range of motion within normal limits with the following exceptions:WFLs    Neurological Testing     Additional Neurological Details  occ episodes of feet feeling like they are asleep    Active Range of Motion   Left Hip   Flexion: 90 degrees   Extension: 10 degrees   Abduction: 30 degrees     Strength/Myotome Testing     Left Hip   Planes of Motion   Flexion: 4-  Extension: 3+  Abduction: 3+  Adduction: 4    Right Hip   Planes of Motion   Flexion: 4-    Additional Strength Details  L knee ext 4/5  L knee flex 4-/5    Tests     Left Hip   Positive VILMA       Additional Tests Details  5TSTS test with UEs = 17 1 sec  TUG test with SPC = 18 7 sec  TUG with divided attention with SPC =22 sec

## 2021-10-14 NOTE — ASSESSMENT & PLAN NOTE
Spoke with Dad, and d/w him the refills were sent in August to [facility]. Dad stated that their insurance has changed. Resent refill request to new pharmacy.    · This is simple mechanical falls patient lost her balance while climbing to to something off the top shelf at [employer]  · No loss of conscious   · Fall precautions   · PT/OT

## 2021-10-27 ENCOUNTER — HOSPITAL ENCOUNTER (OUTPATIENT)
Dept: RADIOLOGY | Facility: HOSPITAL | Age: 81
Discharge: HOME | End: 2021-10-27
Attending: FAMILY MEDICINE
Payer: COMMERCIAL

## 2021-10-27 DIAGNOSIS — M79.674 PAIN OF TOE OF RIGHT FOOT: ICD-10-CM

## 2021-10-27 PROCEDURE — 73660 X-RAY EXAM OF TOE(S): CPT

## 2021-10-29 ENCOUNTER — APPOINTMENT (OUTPATIENT)
Dept: LAB | Facility: HOSPITAL | Age: 81
End: 2021-10-29
Attending: FAMILY MEDICINE
Payer: COMMERCIAL

## 2021-10-29 DIAGNOSIS — M79.674 PAIN OF TOE OF RIGHT FOOT: ICD-10-CM

## 2021-10-29 DIAGNOSIS — G63 GOUTY NEURITIS (HCC): ICD-10-CM

## 2021-10-29 DIAGNOSIS — M10.00 GOUTY NEURITIS (HCC): ICD-10-CM

## 2021-10-29 LAB — URATE SERPL-MCNC: 6 MG/DL (ref 2.3–7.6)

## 2021-10-29 PROCEDURE — 84550 ASSAY OF BLOOD/URIC ACID: CPT

## 2021-11-07 ENCOUNTER — APPOINTMENT (EMERGENCY)
Dept: CT IMAGING | Facility: HOSPITAL | Age: 81
DRG: 379 | End: 2021-11-07
Payer: COMMERCIAL

## 2021-11-07 ENCOUNTER — HOSPITAL ENCOUNTER (INPATIENT)
Facility: HOSPITAL | Age: 81
LOS: 1 days | Discharge: HOME | DRG: 379 | End: 2021-11-08
Attending: EMERGENCY MEDICINE | Admitting: HOSPITALIST
Payer: COMMERCIAL

## 2021-11-07 DIAGNOSIS — K62.5 BRBPR (BRIGHT RED BLOOD PER RECTUM): Primary | ICD-10-CM

## 2021-11-07 DIAGNOSIS — I10 BENIGN ESSENTIAL HTN: Chronic | ICD-10-CM

## 2021-11-07 DIAGNOSIS — K62.5 RECTAL BLEEDING: ICD-10-CM

## 2021-11-07 PROBLEM — S72.002A CLOSED LEFT HIP FRACTURE (HCC): Status: RESOLVED | Noted: 2019-11-14 | Resolved: 2021-11-07

## 2021-11-07 PROBLEM — I95.81 POSTOPERATIVE HYPOTENSION: Status: RESOLVED | Noted: 2019-11-16 | Resolved: 2021-11-07

## 2021-11-07 PROBLEM — R50.9 LOW GRADE FEVER: Status: RESOLVED | Noted: 2019-11-17 | Resolved: 2021-11-07

## 2021-11-07 PROBLEM — T81.19XA POSTOPERATIVE HYPOVOLEMIC SHOCK: Status: RESOLVED | Noted: 2019-11-19 | Resolved: 2021-11-07

## 2021-11-07 PROBLEM — D62 ACUTE BLOOD LOSS ANEMIA: Status: RESOLVED | Noted: 2019-11-15 | Resolved: 2021-11-07
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
## 2021-11-07 LAB
ABO GROUP BLD: NORMAL
ALBUMIN SERPL BCP-MCNC: 4.3 G/DL (ref 3.5–5.7)
ALP SERPL-CCNC: 68 U/L (ref 55–165)
ALT SERPL W P-5'-P-CCNC: 13 U/L (ref 7–52)
ANION GAP SERPL CALCULATED.3IONS-SCNC: 11 MMOL/L (ref 4–13)
APTT PPP: 35 SECONDS (ref 23–37)
AST SERPL W P-5'-P-CCNC: 34 U/L (ref 13–39)
BASOPHILS # BLD AUTO: 0.1 THOUSANDS/ΜL (ref 0–0.1)
BASOPHILS NFR BLD AUTO: 1 % (ref 0–2)
BILIRUB SERPL-MCNC: 0.5 MG/DL (ref 0.2–1)
BLD GP AB SCN SERPL QL: NEGATIVE
BUN SERPL-MCNC: 12 MG/DL (ref 7–25)
CALCIUM SERPL-MCNC: 10.1 MG/DL (ref 8.6–10.5)
CHLORIDE SERPL-SCNC: 102 MMOL/L (ref 98–107)
CO2 SERPL-SCNC: 24 MMOL/L (ref 21–31)
CREAT SERPL-MCNC: 1.21 MG/DL (ref 0.6–1.2)
EOSINOPHIL # BLD AUTO: 0.5 THOUSAND/ΜL (ref 0–0.61)
EOSINOPHIL NFR BLD AUTO: 7 % (ref 0–5)
ERYTHROCYTE [DISTWIDTH] IN BLOOD BY AUTOMATED COUNT: 14.2 % (ref 11.5–14.5)
GFR SERPL CREATININE-BSD FRML MDRD: 43 ML/MIN/1.73SQ M
GLUCOSE SERPL-MCNC: 213 MG/DL (ref 65–99)
HCT VFR BLD AUTO: 42.7 % (ref 42–47)
HGB BLD-MCNC: 14.4 G/DL (ref 12–16)
INR PPP: 1.07 (ref 0.84–1.19)
LIPASE SERPL-CCNC: 37 U/L (ref 11–82)
LYMPHOCYTES # BLD AUTO: 2.1 THOUSANDS/ΜL (ref 0.6–4.47)
LYMPHOCYTES NFR BLD AUTO: 28 % (ref 21–51)
MCH RBC QN AUTO: 29.9 PG (ref 26–34)
MCHC RBC AUTO-ENTMCNC: 33.6 G/DL (ref 31–37)
MCV RBC AUTO: 89 FL (ref 81–99)
MONOCYTES # BLD AUTO: 0.8 THOUSAND/ΜL (ref 0.17–1.22)
MONOCYTES NFR BLD AUTO: 10 % (ref 2–12)
NEUTROPHILS # BLD AUTO: 4.1 THOUSANDS/ΜL (ref 1.4–6.5)
NEUTS SEG NFR BLD AUTO: 54 % (ref 42–75)
PLATELET # BLD AUTO: 321 THOUSANDS/UL (ref 149–390)
PMV BLD AUTO: 8.7 FL (ref 8.6–11.7)
POTASSIUM SERPL-SCNC: 4 MMOL/L (ref 3.5–5.5)
PROT SERPL-MCNC: 7 G/DL (ref 6.4–8.9)
PROTHROMBIN TIME: 14 SECONDS (ref 11.6–14.5)
RBC # BLD AUTO: 4.81 MILLION/UL (ref 3.9–5.2)
RH BLD: POSITIVE
SODIUM SERPL-SCNC: 137 MMOL/L (ref 134–143)
SPECIMEN EXPIRATION DATE: NORMAL
TROPONIN I SERPL-MCNC: <0.03 NG/ML
WBC # BLD AUTO: 7.5 THOUSAND/UL (ref 4.8–10.8)

## 2021-11-07 PROCEDURE — 96365 THER/PROPH/DIAG IV INF INIT: CPT

## 2021-11-07 PROCEDURE — 86900 BLOOD TYPING SEROLOGIC ABO: CPT | Performed by: EMERGENCY MEDICINE

## 2021-11-07 PROCEDURE — 83690 ASSAY OF LIPASE: CPT | Performed by: EMERGENCY MEDICINE

## 2021-11-07 PROCEDURE — 82272 OCCULT BLD FECES 1-3 TESTS: CPT

## 2021-11-07 PROCEDURE — 96366 THER/PROPH/DIAG IV INF ADDON: CPT

## 2021-11-07 PROCEDURE — 85730 THROMBOPLASTIN TIME PARTIAL: CPT | Performed by: EMERGENCY MEDICINE

## 2021-11-07 PROCEDURE — 99285 EMERGENCY DEPT VISIT HI MDM: CPT | Performed by: EMERGENCY MEDICINE

## 2021-11-07 PROCEDURE — 85610 PROTHROMBIN TIME: CPT | Performed by: EMERGENCY MEDICINE

## 2021-11-07 PROCEDURE — 36415 COLL VENOUS BLD VENIPUNCTURE: CPT | Performed by: EMERGENCY MEDICINE

## 2021-11-07 PROCEDURE — 84484 ASSAY OF TROPONIN QUANT: CPT | Performed by: EMERGENCY MEDICINE

## 2021-11-07 PROCEDURE — 99223 1ST HOSP IP/OBS HIGH 75: CPT | Performed by: PHYSICIAN ASSISTANT

## 2021-11-07 PROCEDURE — G1004 CDSM NDSC: HCPCS

## 2021-11-07 PROCEDURE — 93005 ELECTROCARDIOGRAM TRACING: CPT

## 2021-11-07 PROCEDURE — 74177 CT ABD & PELVIS W/CONTRAST: CPT

## 2021-11-07 PROCEDURE — 80053 COMPREHEN METABOLIC PANEL: CPT | Performed by: EMERGENCY MEDICINE

## 2021-11-07 PROCEDURE — 85025 COMPLETE CBC W/AUTO DIFF WBC: CPT | Performed by: EMERGENCY MEDICINE

## 2021-11-07 PROCEDURE — 86850 RBC ANTIBODY SCREEN: CPT | Performed by: EMERGENCY MEDICINE

## 2021-11-07 PROCEDURE — C9113 INJ PANTOPRAZOLE SODIUM, VIA: HCPCS | Performed by: EMERGENCY MEDICINE

## 2021-11-07 PROCEDURE — 99285 EMERGENCY DEPT VISIT HI MDM: CPT

## 2021-11-07 PROCEDURE — 86901 BLOOD TYPING SEROLOGIC RH(D): CPT | Performed by: EMERGENCY MEDICINE

## 2021-11-07 RX ORDER — PANTOPRAZOLE SODIUM 40 MG/1
40 INJECTION, POWDER, FOR SOLUTION INTRAVENOUS EVERY 12 HOURS SCHEDULED
Status: DISCONTINUED | OUTPATIENT
Start: 2021-11-08 | End: 2021-11-08 | Stop reason: HOSPADM

## 2021-11-07 RX ORDER — LISINOPRIL 10 MG/1
10 TABLET ORAL
Status: DISCONTINUED | OUTPATIENT
Start: 2021-11-07 | End: 2021-11-08

## 2021-11-07 RX ORDER — BENAZEPRIL HYDROCHLORIDE 10 MG/1
10 TABLET ORAL
Status: DISCONTINUED | OUTPATIENT
Start: 2021-11-07 | End: 2021-11-07 | Stop reason: RX

## 2021-11-07 RX ORDER — ACETAMINOPHEN 325 MG/1
650 TABLET ORAL EVERY 4 HOURS PRN
Status: DISCONTINUED | OUTPATIENT
Start: 2021-11-07 | End: 2021-11-08 | Stop reason: HOSPADM

## 2021-11-07 RX ORDER — ONDANSETRON 2 MG/ML
4 INJECTION INTRAMUSCULAR; INTRAVENOUS EVERY 6 HOURS PRN
Status: DISCONTINUED | OUTPATIENT
Start: 2021-11-07 | End: 2021-11-08 | Stop reason: HOSPADM

## 2021-11-07 RX ADMIN — IOHEXOL 100 ML: 350 INJECTION, SOLUTION INTRAVENOUS at 21:46

## 2021-11-07 RX ADMIN — SODIUM CHLORIDE 8 MG/HR: 9 INJECTION, SOLUTION INTRAVENOUS at 21:13

## 2021-11-07 RX ADMIN — LISINOPRIL 10 MG: 10 TABLET ORAL at 21:57

## 2021-11-07 RX ADMIN — SODIUM CHLORIDE 80 MG: 9 INJECTION, SOLUTION INTRAVENOUS at 21:13

## 2021-11-08 VITALS
SYSTOLIC BLOOD PRESSURE: 174 MMHG | WEIGHT: 157 LBS | TEMPERATURE: 98.3 F | DIASTOLIC BLOOD PRESSURE: 84 MMHG | HEART RATE: 77 BPM | BODY MASS INDEX: 26.8 KG/M2 | HEIGHT: 64 IN | RESPIRATION RATE: 22 BRPM | OXYGEN SATURATION: 97 %

## 2021-11-08 PROBLEM — K92.2 ACUTE LOWER GI BLEEDING: Status: ACTIVE | Noted: 2021-11-07

## 2021-11-08 PROBLEM — R41.89 COGNITIVE DECLINE: Chronic | Status: ACTIVE | Noted: 2021-11-08

## 2021-11-08 LAB
ANION GAP SERPL CALCULATED.3IONS-SCNC: 8 MMOL/L (ref 4–13)
BUN SERPL-MCNC: 18 MG/DL (ref 7–25)
CALCIUM SERPL-MCNC: 9.2 MG/DL (ref 8.6–10.5)
CHLORIDE SERPL-SCNC: 107 MMOL/L (ref 98–107)
CO2 SERPL-SCNC: 23 MMOL/L (ref 21–31)
CREAT SERPL-MCNC: 1.08 MG/DL (ref 0.6–1.2)
ERYTHROCYTE [DISTWIDTH] IN BLOOD BY AUTOMATED COUNT: 14.3 % (ref 11.5–14.5)
GFR SERPL CREATININE-BSD FRML MDRD: 49 ML/MIN/1.73SQ M
GLUCOSE SERPL-MCNC: 129 MG/DL (ref 65–99)
HCT VFR BLD AUTO: 36 % (ref 42–47)
HCT VFR BLD AUTO: 36.4 % (ref 42–47)
HGB BLD-MCNC: 12.2 G/DL (ref 12–16)
HGB BLD-MCNC: 12.3 G/DL (ref 12–16)
MCH RBC QN AUTO: 29.8 PG (ref 26–34)
MCHC RBC AUTO-ENTMCNC: 33.7 G/DL (ref 31–37)
MCV RBC AUTO: 88 FL (ref 81–99)
PLATELET # BLD AUTO: 268 THOUSANDS/UL (ref 149–390)
PMV BLD AUTO: 8.5 FL (ref 8.6–11.7)
POTASSIUM SERPL-SCNC: 5 MMOL/L (ref 3.5–5.5)
RBC # BLD AUTO: 4.12 MILLION/UL (ref 3.9–5.2)
SODIUM SERPL-SCNC: 138 MMOL/L (ref 134–143)
WBC # BLD AUTO: 8.1 THOUSAND/UL (ref 4.8–10.8)

## 2021-11-08 PROCEDURE — 36415 COLL VENOUS BLD VENIPUNCTURE: CPT | Performed by: PHYSICIAN ASSISTANT

## 2021-11-08 PROCEDURE — NC001 PR NO CHARGE: Performed by: INTERNAL MEDICINE

## 2021-11-08 PROCEDURE — 85027 COMPLETE CBC AUTOMATED: CPT | Performed by: PHYSICIAN ASSISTANT

## 2021-11-08 PROCEDURE — 85014 HEMATOCRIT: CPT | Performed by: INTERNAL MEDICINE

## 2021-11-08 PROCEDURE — 99239 HOSP IP/OBS DSCHRG MGMT >30: CPT | Performed by: INTERNAL MEDICINE

## 2021-11-08 PROCEDURE — C9113 INJ PANTOPRAZOLE SODIUM, VIA: HCPCS | Performed by: EMERGENCY MEDICINE

## 2021-11-08 PROCEDURE — 97166 OT EVAL MOD COMPLEX 45 MIN: CPT

## 2021-11-08 PROCEDURE — 85018 HEMOGLOBIN: CPT | Performed by: INTERNAL MEDICINE

## 2021-11-08 PROCEDURE — 80048 BASIC METABOLIC PNL TOTAL CA: CPT | Performed by: PHYSICIAN ASSISTANT

## 2021-11-08 RX ORDER — AMOXICILLIN 250 MG
2 CAPSULE ORAL
Status: DISCONTINUED | OUTPATIENT
Start: 2021-11-08 | End: 2021-11-08 | Stop reason: HOSPADM

## 2021-11-08 RX ORDER — LISINOPRIL 20 MG/1
40 TABLET ORAL
Status: DISCONTINUED | OUTPATIENT
Start: 2021-11-08 | End: 2021-11-08

## 2021-11-08 RX ORDER — AMLODIPINE BESYLATE 5 MG/1
5 TABLET ORAL DAILY
Qty: 30 TABLET | Refills: 0 | Status: SHIPPED | OUTPATIENT
Start: 2021-11-08

## 2021-11-08 RX ORDER — TEMAZEPAM 15 MG/1
30 CAPSULE ORAL
Status: DISCONTINUED | OUTPATIENT
Start: 2021-11-08 | End: 2021-11-08 | Stop reason: HOSPADM

## 2021-11-08 RX ORDER — LISINOPRIL 20 MG/1
40 TABLET ORAL DAILY
Status: DISCONTINUED | OUTPATIENT
Start: 2021-11-08 | End: 2021-11-08 | Stop reason: HOSPADM

## 2021-11-08 RX ORDER — AMLODIPINE BESYLATE 5 MG/1
5 TABLET ORAL DAILY
Status: DISCONTINUED | OUTPATIENT
Start: 2021-11-08 | End: 2021-11-08 | Stop reason: HOSPADM

## 2021-11-08 RX ORDER — MEMANTINE HYDROCHLORIDE 5 MG/1
5 TABLET ORAL 2 TIMES DAILY
Status: DISCONTINUED | OUTPATIENT
Start: 2021-11-08 | End: 2021-11-08 | Stop reason: HOSPADM

## 2021-11-08 RX ADMIN — PANTOPRAZOLE SODIUM 40 MG: 40 INJECTION, POWDER, FOR SOLUTION INTRAVENOUS at 08:18

## 2021-11-08 RX ADMIN — MEMANTINE 5 MG: 5 TABLET ORAL at 08:18

## 2021-11-08 RX ADMIN — AMLODIPINE BESYLATE 5 MG: 5 TABLET ORAL at 15:42

## 2021-11-08 RX ADMIN — LISINOPRIL 40 MG: 20 TABLET ORAL at 09:38

## 2021-11-13 LAB
ATRIAL RATE: 87 BPM
P AXIS: 54 DEGREES
PR INTERVAL: 150 MS
QRS AXIS: -13 DEGREES
QRSD INTERVAL: 80 MS
QT INTERVAL: 374 MS
QTC INTERVAL: 450 MS
T WAVE AXIS: 93 DEGREES
VENTRICULAR RATE: 87 BPM

## 2021-11-13 PROCEDURE — 93010 ELECTROCARDIOGRAM REPORT: CPT | Performed by: INTERNAL MEDICINE

## 2022-03-28 ENCOUNTER — APPOINTMENT (OUTPATIENT)
Dept: LAB | Facility: HOSPITAL | Age: 82
End: 2022-03-28
Attending: FAMILY MEDICINE
Payer: COMMERCIAL

## 2022-03-28 DIAGNOSIS — Z13.220 SCREENING FOR LIPOID DISORDERS: ICD-10-CM

## 2022-03-28 DIAGNOSIS — I51.9 MYXEDEMA HEART DISEASE: ICD-10-CM

## 2022-03-28 DIAGNOSIS — I10 HYPERTENSION, UNSPECIFIED TYPE: ICD-10-CM

## 2022-03-28 DIAGNOSIS — E03.9 MYXEDEMA HEART DISEASE: ICD-10-CM

## 2022-03-28 LAB
ALBUMIN SERPL BCP-MCNC: 3.9 G/DL (ref 3.5–5)
ALP SERPL-CCNC: 67 U/L (ref 46–116)
ALT SERPL W P-5'-P-CCNC: 27 U/L (ref 12–78)
ANION GAP SERPL CALCULATED.3IONS-SCNC: 7 MMOL/L (ref 4–13)
AST SERPL W P-5'-P-CCNC: 39 U/L (ref 5–45)
BILIRUB SERPL-MCNC: 0.61 MG/DL (ref 0.2–1)
BUN SERPL-MCNC: 17 MG/DL (ref 5–25)
CALCIUM SERPL-MCNC: 9.4 MG/DL (ref 8.3–10.1)
CHLORIDE SERPL-SCNC: 107 MMOL/L (ref 100–108)
CHOLEST SERPL-MCNC: 175 MG/DL
CO2 SERPL-SCNC: 24 MMOL/L (ref 21–32)
CREAT SERPL-MCNC: 1.16 MG/DL (ref 0.6–1.3)
ERYTHROCYTE [DISTWIDTH] IN BLOOD BY AUTOMATED COUNT: 13.8 % (ref 11.6–15.1)
GFR SERPL CREATININE-BSD FRML MDRD: 44 ML/MIN/1.73SQ M
GLUCOSE P FAST SERPL-MCNC: 116 MG/DL (ref 65–99)
HCT VFR BLD AUTO: 44.6 % (ref 34.8–46.1)
HDLC SERPL-MCNC: 38 MG/DL
HGB BLD-MCNC: 14.5 G/DL (ref 11.5–15.4)
LDLC SERPL CALC-MCNC: 95 MG/DL (ref 0–100)
MCH RBC QN AUTO: 30.1 PG (ref 26.8–34.3)
MCHC RBC AUTO-ENTMCNC: 32.5 G/DL (ref 31.4–37.4)
MCV RBC AUTO: 93 FL (ref 82–98)
NONHDLC SERPL-MCNC: 137 MG/DL
PLATELET # BLD AUTO: 273 THOUSANDS/UL (ref 149–390)
PMV BLD AUTO: 11 FL (ref 8.9–12.7)
POTASSIUM SERPL-SCNC: 4.2 MMOL/L (ref 3.5–5.3)
PROT SERPL-MCNC: 7.2 G/DL (ref 6.4–8.2)
RBC # BLD AUTO: 4.82 MILLION/UL (ref 3.81–5.12)
SODIUM SERPL-SCNC: 138 MMOL/L (ref 136–145)
TRIGL SERPL-MCNC: 211 MG/DL
TSH SERPL DL<=0.05 MIU/L-ACNC: 1.94 UIU/ML (ref 0.36–3.74)
WBC # BLD AUTO: 6.62 THOUSAND/UL (ref 4.31–10.16)

## 2022-03-28 PROCEDURE — 84443 ASSAY THYROID STIM HORMONE: CPT

## 2022-03-28 PROCEDURE — 85027 COMPLETE CBC AUTOMATED: CPT

## 2022-03-28 PROCEDURE — 36415 COLL VENOUS BLD VENIPUNCTURE: CPT

## 2022-03-28 PROCEDURE — 80053 COMPREHEN METABOLIC PANEL: CPT

## 2022-03-28 PROCEDURE — 80061 LIPID PANEL: CPT

## 2022-05-04 ENCOUNTER — APPOINTMENT (OUTPATIENT)
Dept: LAB | Facility: HOSPITAL | Age: 82
End: 2022-05-04
Attending: FAMILY MEDICINE
Payer: COMMERCIAL

## 2022-05-04 DIAGNOSIS — R79.9 ABNORMAL BLOOD CHEMISTRY: ICD-10-CM

## 2022-05-04 LAB
EST. AVERAGE GLUCOSE BLD GHB EST-MCNC: 114 MG/DL
HBA1C MFR BLD: 5.6 %

## 2022-05-04 PROCEDURE — 83036 HEMOGLOBIN GLYCOSYLATED A1C: CPT

## 2022-05-04 PROCEDURE — 36415 COLL VENOUS BLD VENIPUNCTURE: CPT

## 2022-07-29 ENCOUNTER — HOSPITAL ENCOUNTER (OUTPATIENT)
Dept: RADIOLOGY | Facility: HOSPITAL | Age: 82
Discharge: HOME | End: 2022-07-29
Payer: COMMERCIAL

## 2022-07-29 DIAGNOSIS — M19.071 ARTHRITIS OF RIGHT ANKLE: ICD-10-CM

## 2022-07-29 DIAGNOSIS — M79.671 RIGHT FOOT PAIN: ICD-10-CM

## 2022-07-29 PROCEDURE — 73630 X-RAY EXAM OF FOOT: CPT

## 2023-10-25 ENCOUNTER — HOSPITAL ENCOUNTER (OUTPATIENT)
Facility: HOSPITAL | Age: 83
Setting detail: OBSERVATION
Discharge: HOME HEALTH | End: 2023-10-26
Attending: SURGERY | Admitting: SURGERY
Payer: COMMERCIAL

## 2023-10-25 ENCOUNTER — HOSPITAL ENCOUNTER (EMERGENCY)
Facility: HOSPITAL | Age: 83
End: 2023-10-25
Attending: STUDENT IN AN ORGANIZED HEALTH CARE EDUCATION/TRAINING PROGRAM
Payer: COMMERCIAL

## 2023-10-25 ENCOUNTER — APPOINTMENT (EMERGENCY)
Dept: RADIOLOGY | Facility: HOSPITAL | Age: 83
End: 2023-10-25
Payer: COMMERCIAL

## 2023-10-25 ENCOUNTER — APPOINTMENT (EMERGENCY)
Dept: CT IMAGING | Facility: HOSPITAL | Age: 83
End: 2023-10-25
Payer: COMMERCIAL

## 2023-10-25 ENCOUNTER — APPOINTMENT (OUTPATIENT)
Dept: RADIOLOGY | Facility: HOSPITAL | Age: 83
End: 2023-10-25
Payer: COMMERCIAL

## 2023-10-25 VITALS
TEMPERATURE: 98.1 F | SYSTOLIC BLOOD PRESSURE: 99 MMHG | HEART RATE: 69 BPM | WEIGHT: 132.06 LBS | OXYGEN SATURATION: 97 % | DIASTOLIC BLOOD PRESSURE: 51 MMHG | RESPIRATION RATE: 18 BRPM | BODY MASS INDEX: 22.67 KG/M2

## 2023-10-25 DIAGNOSIS — S70.02XA HEMATOMA OF LEFT HIP, INITIAL ENCOUNTER: Primary | ICD-10-CM

## 2023-10-25 DIAGNOSIS — S70.12XA HEMATOMA OF LEFT THIGH, INITIAL ENCOUNTER: ICD-10-CM

## 2023-10-25 DIAGNOSIS — R58 ACTIVE INTERNAL BLEEDING: ICD-10-CM
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
## 2023-10-25 LAB
ABO GROUP BLD: NORMAL
ALBUMIN SERPL BCP-MCNC: 3.3 G/DL (ref 3.5–5)
ALP SERPL-CCNC: 59 U/L (ref 34–104)
ALT SERPL W P-5'-P-CCNC: 6 U/L (ref 7–52)
ANION GAP SERPL CALCULATED.3IONS-SCNC: 9 MMOL/L
AST SERPL W P-5'-P-CCNC: 17 U/L (ref 13–39)
BASOPHILS # BLD AUTO: 0.07 THOUSANDS/ÂΜL (ref 0–0.1)
BASOPHILS NFR BLD AUTO: 1 % (ref 0–1)
BILIRUB SERPL-MCNC: 0.56 MG/DL (ref 0.2–1)
BLD GP AB SCN SERPL QL: NEGATIVE
BUN SERPL-MCNC: 12 MG/DL (ref 5–25)
CALCIUM ALBUM COR SERPL-MCNC: 9.3 MG/DL (ref 8.3–10.1)
CALCIUM SERPL-MCNC: 8.7 MG/DL (ref 8.4–10.2)
CHLORIDE SERPL-SCNC: 106 MMOL/L (ref 96–108)
CO2 SERPL-SCNC: 21 MMOL/L (ref 21–32)
CREAT SERPL-MCNC: 0.94 MG/DL (ref 0.6–1.3)
EOSINOPHIL # BLD AUTO: 0.38 THOUSAND/ÂΜL (ref 0–0.61)
EOSINOPHIL NFR BLD AUTO: 4 % (ref 0–6)
ERYTHROCYTE [DISTWIDTH] IN BLOOD BY AUTOMATED COUNT: 13.2 % (ref 11.6–15.1)
GFR SERPL CREATININE-BSD FRML MDRD: 57 ML/MIN/1.73SQ M
GLUCOSE SERPL-MCNC: 112 MG/DL (ref 65–140)
HCT VFR BLD AUTO: 32.9 % (ref 34.8–46.1)
HCT VFR BLD AUTO: 35.8 % (ref 34.8–46.1)
HGB BLD-MCNC: 11.1 G/DL (ref 11.5–15.4)
HGB BLD-MCNC: 11.6 G/DL (ref 11.5–15.4)
IMM GRANULOCYTES # BLD AUTO: 0.04 THOUSAND/UL (ref 0–0.2)
IMM GRANULOCYTES NFR BLD AUTO: 1 % (ref 0–2)
LYMPHOCYTES # BLD AUTO: 1.82 THOUSANDS/ÂΜL (ref 0.6–4.47)
LYMPHOCYTES NFR BLD AUTO: 21 % (ref 14–44)
MCH RBC QN AUTO: 30.7 PG (ref 26.8–34.3)
MCHC RBC AUTO-ENTMCNC: 32.4 G/DL (ref 31.4–37.4)
MCV RBC AUTO: 95 FL (ref 82–98)
MONOCYTES # BLD AUTO: 0.66 THOUSAND/ÂΜL (ref 0.17–1.22)
MONOCYTES NFR BLD AUTO: 8 % (ref 4–12)
NEUTROPHILS # BLD AUTO: 5.59 THOUSANDS/ÂΜL (ref 1.85–7.62)
NEUTS SEG NFR BLD AUTO: 65 % (ref 43–75)
NRBC BLD AUTO-RTO: 0 /100 WBCS
PLATELET # BLD AUTO: 235 THOUSANDS/UL (ref 149–390)
PMV BLD AUTO: 10.3 FL (ref 8.9–12.7)
POTASSIUM SERPL-SCNC: 3.8 MMOL/L (ref 3.5–5.3)
PROT SERPL-MCNC: 5.5 G/DL (ref 6.4–8.4)
RBC # BLD AUTO: 3.78 MILLION/UL (ref 3.81–5.12)
RH BLD: POSITIVE
SODIUM SERPL-SCNC: 136 MMOL/L (ref 135–147)
SPECIMEN EXPIRATION DATE: NORMAL
WBC # BLD AUTO: 8.56 THOUSAND/UL (ref 4.31–10.16)

## 2023-10-25 PROCEDURE — 85014 HEMATOCRIT: CPT

## 2023-10-25 PROCEDURE — G1004 CDSM NDSC: HCPCS

## 2023-10-25 PROCEDURE — 99284 EMERGENCY DEPT VISIT MOD MDM: CPT

## 2023-10-25 PROCEDURE — 96375 TX/PRO/DX INJ NEW DRUG ADDON: CPT

## 2023-10-25 PROCEDURE — 99285 EMERGENCY DEPT VISIT HI MDM: CPT

## 2023-10-25 PROCEDURE — 72170 X-RAY EXAM OF PELVIS: CPT

## 2023-10-25 PROCEDURE — 99223 1ST HOSP IP/OBS HIGH 75: CPT | Performed by: SURGERY

## 2023-10-25 PROCEDURE — 76705 ECHO EXAM OF ABDOMEN: CPT | Performed by: STUDENT IN AN ORGANIZED HEALTH CARE EDUCATION/TRAINING PROGRAM

## 2023-10-25 PROCEDURE — 80053 COMPREHEN METABOLIC PANEL: CPT | Performed by: STUDENT IN AN ORGANIZED HEALTH CARE EDUCATION/TRAINING PROGRAM

## 2023-10-25 PROCEDURE — 86901 BLOOD TYPING SEROLOGIC RH(D): CPT | Performed by: STUDENT IN AN ORGANIZED HEALTH CARE EDUCATION/TRAINING PROGRAM

## 2023-10-25 PROCEDURE — 99291 CRITICAL CARE FIRST HOUR: CPT | Performed by: STUDENT IN AN ORGANIZED HEALTH CARE EDUCATION/TRAINING PROGRAM

## 2023-10-25 PROCEDURE — 86900 BLOOD TYPING SEROLOGIC ABO: CPT | Performed by: STUDENT IN AN ORGANIZED HEALTH CARE EDUCATION/TRAINING PROGRAM

## 2023-10-25 PROCEDURE — 96365 THER/PROPH/DIAG IV INF INIT: CPT

## 2023-10-25 PROCEDURE — 70450 CT HEAD/BRAIN W/O DYE: CPT

## 2023-10-25 PROCEDURE — 86850 RBC ANTIBODY SCREEN: CPT | Performed by: STUDENT IN AN ORGANIZED HEALTH CARE EDUCATION/TRAINING PROGRAM

## 2023-10-25 PROCEDURE — 36415 COLL VENOUS BLD VENIPUNCTURE: CPT | Performed by: STUDENT IN AN ORGANIZED HEALTH CARE EDUCATION/TRAINING PROGRAM

## 2023-10-25 PROCEDURE — 72125 CT NECK SPINE W/O DYE: CPT

## 2023-10-25 PROCEDURE — 85018 HEMOGLOBIN: CPT

## 2023-10-25 PROCEDURE — 74177 CT ABD & PELVIS W/CONTRAST: CPT

## 2023-10-25 PROCEDURE — 85025 COMPLETE CBC W/AUTO DIFF WBC: CPT | Performed by: STUDENT IN AN ORGANIZED HEALTH CARE EDUCATION/TRAINING PROGRAM

## 2023-10-25 PROCEDURE — 73552 X-RAY EXAM OF FEMUR 2/>: CPT

## 2023-10-25 PROCEDURE — 71045 X-RAY EXAM CHEST 1 VIEW: CPT

## 2023-10-25 RX ORDER — DESMOPRESSIN ACETATE 4 UG/ML
3 INJECTION, SOLUTION INTRAVENOUS; SUBCUTANEOUS ONCE
Status: DISCONTINUED | OUTPATIENT
Start: 2023-10-25 | End: 2023-10-25

## 2023-10-25 RX ORDER — FENTANYL CITRATE 50 UG/ML
50 INJECTION, SOLUTION INTRAMUSCULAR; INTRAVENOUS ONCE
Status: COMPLETED | OUTPATIENT
Start: 2023-10-25 | End: 2023-10-25

## 2023-10-25 RX ORDER — SENNOSIDES 8.6 MG
2 TABLET ORAL DAILY
Status: DISCONTINUED | OUTPATIENT
Start: 2023-10-25 | End: 2023-10-27 | Stop reason: HOSPADM

## 2023-10-25 RX ORDER — MEMANTINE HYDROCHLORIDE 5 MG/1
5 TABLET ORAL 2 TIMES DAILY
Status: DISCONTINUED | OUTPATIENT
Start: 2023-10-25 | End: 2023-10-27 | Stop reason: HOSPADM

## 2023-10-25 RX ORDER — AMLODIPINE BESYLATE 5 MG/1
5 TABLET ORAL DAILY
Status: DISCONTINUED | OUTPATIENT
Start: 2023-10-26 | End: 2023-10-27 | Stop reason: HOSPADM

## 2023-10-25 RX ORDER — ACETAMINOPHEN 325 MG/1
975 TABLET ORAL EVERY 8 HOURS SCHEDULED
Status: DISCONTINUED | OUTPATIENT
Start: 2023-10-25 | End: 2023-10-27 | Stop reason: HOSPADM

## 2023-10-25 RX ORDER — SODIUM CHLORIDE 9 MG/ML
1000 INJECTION, SOLUTION INTRAVENOUS ONCE
Status: COMPLETED | OUTPATIENT
Start: 2023-10-25 | End: 2023-10-25

## 2023-10-25 RX ORDER — POLYETHYLENE GLYCOL 3350 17 G/17G
17 POWDER, FOR SOLUTION ORAL DAILY
Status: DISCONTINUED | OUTPATIENT
Start: 2023-10-25 | End: 2023-10-27 | Stop reason: HOSPADM

## 2023-10-25 RX ORDER — ONDANSETRON 2 MG/ML
4 INJECTION INTRAMUSCULAR; INTRAVENOUS EVERY 6 HOURS PRN
Status: DISCONTINUED | OUTPATIENT
Start: 2023-10-25 | End: 2023-10-27 | Stop reason: HOSPADM

## 2023-10-25 RX ORDER — AMOXICILLIN 250 MG
2 CAPSULE ORAL
Status: DISCONTINUED | OUTPATIENT
Start: 2023-10-25 | End: 2023-10-27 | Stop reason: HOSPADM

## 2023-10-25 RX ORDER — BISACODYL 10 MG
10 SUPPOSITORY, RECTAL RECTAL DAILY PRN
Status: DISCONTINUED | OUTPATIENT
Start: 2023-10-25 | End: 2023-10-27 | Stop reason: HOSPADM

## 2023-10-25 RX ORDER — OXYCODONE HYDROCHLORIDE 5 MG/1
5 TABLET ORAL EVERY 4 HOURS PRN
Status: DISCONTINUED | OUTPATIENT
Start: 2023-10-25 | End: 2023-10-27 | Stop reason: HOSPADM

## 2023-10-25 RX ORDER — HYDROMORPHONE HCL IN WATER/PF 6 MG/30 ML
0.2 PATIENT CONTROLLED ANALGESIA SYRINGE INTRAVENOUS EVERY 4 HOURS PRN
Status: DISCONTINUED | OUTPATIENT
Start: 2023-10-25 | End: 2023-10-27 | Stop reason: HOSPADM

## 2023-10-25 RX ADMIN — IOHEXOL 100 ML: 350 INJECTION, SOLUTION INTRAVENOUS at 11:36

## 2023-10-25 RX ADMIN — DESMOPRESSIN ACETATE 18 MCG: 4 INJECTION, SOLUTION INTRAVENOUS; SUBCUTANEOUS at 12:44

## 2023-10-25 RX ADMIN — FENTANYL CITRATE 50 MCG: 50 INJECTION INTRAMUSCULAR; INTRAVENOUS at 11:50

## 2023-10-25 RX ADMIN — ACETAMINOPHEN 975 MG: 325 TABLET, FILM COATED ORAL at 21:34

## 2023-10-25 RX ADMIN — MEMANTINE 5 MG: 5 TABLET ORAL at 21:34

## 2023-10-25 RX ADMIN — SENNOSIDES, DOCUSATE SODIUM 2 TABLET: 8.6; 5 TABLET ORAL at 21:34

## 2023-10-25 RX ADMIN — HYDROMORPHONE HYDROCHLORIDE 0.2 MG: 0.2 INJECTION, SOLUTION INTRAMUSCULAR; INTRAVENOUS; SUBCUTANEOUS at 15:05

## 2023-10-25 RX ADMIN — SODIUM CHLORIDE 1000 ML/HR: 0.9 INJECTION, SOLUTION INTRAVENOUS at 13:00

## 2023-10-25 NOTE — EMTALA/ACUTE CARE TRANSFER
[address] [address]  Dept: [phone]      EMTALA TRANSFER CONSENT    NAME Melida Lott                                         1940                              MRN [id number]    I have been informed of my rights regarding examination, treatment, and transfer   by Dr. Anastasiya Ansari MD    Benefits: Specialized equipment and/or services available at the receiving facility (Include comment)________________________, Continuity of care    Risks: Potential for delay in receiving treatment, Potential deterioration of medical condition, Loss of IV, Possible worsening of condition or death during transfer, Increased discomfort during transfer      Consent for Transfer:  I acknowledge that my medical condition has been evaluated and explained to me by the emergency department physician or other qualified medical person and/or my attending physician, who has recommended that I be transferred to the service of  Accepting Physician: [facility]  . The above potential benefits of such transfer, the potential risks associated with such transfer, and the probable risks of not being transferred have been explained to me, and I fully understand them. The doctor has explained that, in my case, the benefits of transfer outweigh the risks. I agree to be transferred. I authorize the performance of emergency medical procedures and treatments upon me in both transit and upon arrival at the receiving facility. Additionally, I authorize the release of any and all medical records to the receiving facility and request they be transported with me, if possible. I understand that the safest mode of transportation during a medical emergency is an ambulance and that the Hospital advocates the use of this mode of transport.  Risks of traveling to the receiving facility by car, including absence of medical control, life sustaining equipment, such as oxygen, and medical personnel has been explained to me and I fully understand them. (RAYA CORRECT BOX BELOW)  [  ]  I consent to the stated transfer and to be transported by ambulance/helicopter. [  ]  I consent to the stated transfer, but refuse transportation by ambulance and accept full responsibility for my transportation by car. I understand the risks of non-ambulance transfers and I exonerate the Hospital and its staff from any deterioration in my condition that results from this refusal.    X___________________________________________    DATE  10/25/23  TIME________  Signature of patient or legally responsible individual signing on patient behalf           RELATIONSHIP TO PATIENT_________________________          Provider Certification    NAME Dale Osborne                                         1940                              MRN [id number]    A medical screening exam was performed on the above named patient. Based on the examination:    Condition Necessitating Transfer The primary encounter diagnosis was Fall, initial encounter. Diagnoses of Active internal bleeding and Hematoma of left thigh, initial encounter were also pertinent to this visit.     Patient Condition: The patient has been stabilized such that within reasonable medical probability, no material deterioration of the patient condition or the condition of the unborn child(micaela) is likely to result from the transfer    Reason for Transfer: Level of Care needed not available at this facility    Transfer Requirements: [address] available and qualified personnel available for treatment as acknowledged by    Agreed to accept transfer and to provide appropriate medical treatment as acknowledged by       tiffani  Appropriate medical records of the examination and treatment of the patient are provided at the time of transfer   [address] [facility] [address] _______  Transfer will be performed by qualified personnel from    and appropriate transfer equipment as required, including the use of necessary and appropriate life support measures. Provider Certification: I have examined the patient and explained the following risks and benefits of being transferred/refusing transfer to the patient/family:         Based on these reasonable risks and benefits to the patient and/or the unborn child(micaela), and based upon the information available at the time of the patient’s examination, I certify that the medical benefits reasonably to be expected from the provision of appropriate medical treatments at another medical facility outweigh the increasing risks, if any, to the individual’s medical condition, and in the case of labor to the unborn child, from effecting the transfer.     X____________________________________________ DATE 10/25/23        TIME_______      ORIGINAL - SEND TO MEDICAL RECORDS   COPY - SEND WITH PATIENT DURING TRANSFER

## 2023-10-25 NOTE — H&P
[address]  H&P  Name: Lakhwinder Schumacher 82 y.o. female I MRN: [id number]  [facility] Date of Admission: 10/25/2023   Date of Service: 10/25/2023 I Hospital Day: 0      Assessment/Plan   Hip hematoma, left  Assessment & Plan  Patient s/p fall 10/25  CT abdomen/pelvis on 10/25 with subcutaneous hematoma in the subcutaneous tissues of the left lateral hip with contrast extravasation / active hemorrhage  Compression wrapping in place  Distal pulses intact  Will continue to monitor  Q12 H&H  Q4 neurovascular checks    Benign essential HTN  Assessment & Plan  Patient on amlodipine and benazepril at home  Amlodipine re-ordered  Continue to monitor blood pressure             Trauma Alert: Other transfer from outside hospital    Model of Arrival: Ambulance and Transfer       Trauma Team: Attending Stacy Seals and Residents Daryl Garcia  Consultants:     None     History of Present Illness     Chief Complaint: Fall, left thigh hematoma  Mechanism:Fall     HPI:    Lakhwinder Schumacher is a 82 y.o. female who presents with left thigh hematoma after fall. Per chart review from [address] [place] [address] ED patient is on ASA and presented after trip and fall into a table this morning. Patient denied head strike at that time. Review of Systems   Unable to perform ROS: Mental status change     12-point, complete review of systems was reviewed and negative except as stated above. Historical Information     Past Medical History:   Diagnosis Date    Closed left hip fracture ([address]) 11/14/2019    Hypertension     Postoperative hypotension 11/16/2019    Postoperative hypovolemic shock 11/19/2019     Past Surgical History:   Procedure Laterality Date    ID OPTX FEM SHFT FX W/INSJ IMED IMPLT W/WO SCREW Left 11/15/2019    Procedure: INSERTION NAIL IM FEMUR ANTEGRADE (TROCHANTERIC);   Surgeon: Heather Trammell MD;  Location: [facility] MAIN OR;  Service: Orthopedics        Social History     Tobacco Use    Smoking status: Never    Smokeless tobacco: Never   Substance Use Topics    Alcohol use: Not Currently    Drug use: Never       There is no immunization history on file for this patient. Last Tetanus: unknown  Family History: Non-contributory    1. Before the illness or injury that brought you to the Emergency, did you need someone to help you on a regular basis? 1=Yes   2. Since the illness or injury that brought you to the Emergency, have you needed more help than usual to take care of yourself? 0=No   3. Have you been hospitalized for one or more nights during the past 6 months (excluding a stay in the Emergency Department)? 0=No   4. In general, do you see well? 0=Yes   5. In general, do you have serious problems with your memory? 1=Yes   6. Do you take more than three different medications everyday? 1=Yes   TOTAL   3     Did you order a geriatric consult if the score was 2 or greater?: yes     Meds/Allergies   all current active meds have been reviewed     Allergies   Allergen Reactions    Penicillins        Objective   Initial Vitals:   Temperature: 98.1 °F (36.7 °C) (10/25/23 1410)  Pulse: 66 (10/25/23 1409)  Respirations: 18 (10/25/23 1409)  Blood Pressure: 143/63 (10/25/23 1409)    Primary Survey:   Airway:        Status: patent;                  Breathing:               Effort: normal       Right breath sounds: normal       Left breath sounds: normal  Circulation:        Rhythm: regular       Rate: regular   Right Pulses Left Pulses    R radial: 2+  R femoral: 2+  R pedal: 2+                Disability:        GCS: Eye: 4; Verbal: 4 Motor: 6 Total: 14       Right Pupil:       Left Pupil:     R Motor Strength L Motor Strength               Exposure:           Secondary Survey:  Physical Exam  Vitals and nursing note reviewed. Constitutional:       Appearance: Normal appearance. HENT:      Head: Normocephalic and atraumatic.       Mouth/Throat:      Mouth: Mucous membranes are moist.   Eyes:      Pupils: Pupils are equal, round, and reactive to light. Cardiovascular:      Rate and Rhythm: Normal rate and regular rhythm. Pulses: Normal pulses. Pulmonary:      Effort: Pulmonary effort is normal.      Breath sounds: Normal breath sounds. Abdominal:      General: Abdomen is flat. Palpations: Abdomen is soft. Musculoskeletal:      Cervical back: Normal range of motion. Comments: Swelling and TTP left thigh, ACE bandage in place. Intact distal pulses   Skin:     General: Skin is warm and dry. Neurological:      General: No focal deficit present. Mental Status: She is alert. She is disoriented. Invasive Devices       Peripheral Intravenous Line  Duration             Peripheral IV 10/25/23 Distal;Left;Upper;Ventral (anterior) Arm <1 day    Peripheral IV 10/25/23 Right Antecubital <1 day                  Lab Results: I have personally reviewed all pertinent laboratory/test results from 10/25/23, including the preceding 24 hours. Recent Labs     10/25/23  1149 10/25/23  1510   WBC 8.56  --    HGB 11.6 11.1*   HCT 35.8 32.9*     --    SODIUM 136  --    K 3.8  --      --    CO2 21  --    BUN 12  --    CREATININE 0.94  --    GLUC 112  --    AST 17  --    ALT 6*  --    ALB 3.3*  --    TBILI 0.56  --    ALKPHOS 59  --        Imaging Results: I have personally reviewed pertinent images saved in PACS. CT scan findings (and other pertinent positive findings on images) were discussed with radiology. My interpretation of the images/reports are as follows:  Chest Xray(s): negative for acute findings   FAST exam(s): N/A   CT Scan(s): positive for acute findings: left lateral hip hematoma with active hemorrhage.  CT head and cervical spine pending   Additional Xray(s): N/A     Other Studies: Imaging from prior hospital reviewed    Code Status: Level 1 - Full Code  Advance Directive and Living Will:    unknown  Power of :  unknown  POLST:  unknown  I have spent 45 minutes with Patient  today in which greater than 50% of this time was spent in counseling/coordination of care regarding Diagnostic results, Patient and family education, Impressions, and Counseling / Coordination of care.

## 2023-10-25 NOTE — ASSESSMENT & PLAN NOTE
Patient s/p fall 10/25  CT abdomen/pelvis on 10/25 with subcutaneous hematoma in the subcutaneous tissues of the left lateral hip with contrast extravasation / active hemorrhage  Compression wrapping in place  Distal pulses intact  Will continue to monitor  Q12 H&H  Q4 neurovascular checks

## 2023-10-25 NOTE — ASSESSMENT & PLAN NOTE
Patient on amlodipine and benazepril at home  Amlodipine re-ordered  Continue to monitor blood pressure

## 2023-10-25 NOTE — ED PROVIDER NOTES
Emergency Department Trauma Note  Gabbie Alvarez 82 y.o. female MRN: [id number]  Unit/Bed#: ED 19/ED 19 Encounter: [id number]      Trauma Alert: Trauma Acuity: Trauma Evaluation  Model of Arrival:   via    Trauma Team: Current Providers  Attending Provider: María Elena Handy MD  Registered Nurse: Madie Claire RN  Consultants:     None      History of Present Illness     Chief Complaint:   Chief Complaint   Patient presents with    Trauma    Fall     Patient tripped and fell this morning onto left side; patient takes ASA; denies headstrike     HPI:  Gabbie Alvarez is a 82 y.o. female who presents with  Mechanism:Details of Incident: Patient tripped and fell into table; patient c/o left hip and abdominal pain. Patient takes ASA daily. Patient denies headstrike Injury Date: 10/25/23 Injury Time: 0930 Injury Occurence Location - [address] [place] [address]: [facility]  Review of Systems   Constitutional:  Positive for fatigue. Negative for activity change, appetite change, chills and fever. HENT:  Negative for congestion, dental problem, drooling, ear discharge, ear pain, facial swelling, postnasal drip, rhinorrhea and sinus pain. Eyes:  Negative for photophobia, pain, discharge and itching. Respiratory:  Negative for apnea, cough, chest tightness and shortness of breath. Cardiovascular:  Negative for chest pain and leg swelling. Gastrointestinal:  Negative for abdominal distention, abdominal pain, anal bleeding, constipation, diarrhea and nausea. Endocrine: Negative for cold intolerance, heat intolerance and polydipsia. Genitourinary:  Negative for difficulty urinating. Musculoskeletal:  Positive for gait problem, joint swelling and myalgias. Negative for arthralgias. Skin:  Negative for color change and pallor. Allergic/Immunologic: Negative for immunocompromised state. Neurological:  Negative for dizziness, seizures, facial asymmetry, weakness, light-headedness, numbness and headaches. Psychiatric/Behavioral:  Negative for agitation, behavioral problems, confusion, decreased concentration and dysphoric mood. All other systems reviewed and are negative. Historical Information     Immunizations: There is no immunization history on file for this patient. Past Medical History:   Diagnosis Date    Closed left hip fracture ([address]) 2019    Hypertension     Postoperative hypotension 2019    Postoperative hypovolemic shock 2019     No family history on file. Past Surgical History:   Procedure Laterality Date    OH OPEN RX FEMUR FX+INTRAMED LOWELL Left 11/15/2019    Procedure: INSERTION NAIL IM FEMUR ANTEGRADE (TROCHANTERIC); Surgeon: Emeka Paulino MD;  Location: [facility] MAIN OR;  Service: Orthopedics     Social History     Tobacco Use    Smoking status: Never    Smokeless tobacco: Never   Substance Use Topics    Alcohol use: Not Currently    Drug use: Never     E-Cigarette/Vaping     E-Cigarette/Vaping Substances       Family History: non-contributory    Meds/Allergies   Prior to Admission Medications   Prescriptions Last Dose Informant Patient Reported? Taking?    Apoaequorin 10 MG CAPS   No No   Sig: Take 1 capsule (10 mg total) by mouth daily   amLODIPine (NORVASC) 5 mg tablet   No No   Sig: Take 1 tablet (5 mg total) by mouth daily   aspirin (ECOTRIN LOW STRENGTH) 81 mg EC tablet 10/25/2023  Yes Yes   Sig: Take 162 mg by mouth daily   benazepril (LOTENSIN) 10 mg tablet   No No   Sig: Take 1 tablet (10 mg total) by mouth daily   bisacodyl (DULCOLAX) 10 mg suppository   No No   Sig: Insert 1 suppository (10 mg total) into the rectum daily as needed for constipation   memantine (NAMENDA) 5 mg tablet   Yes No   Si mg 2 (two) times a day   senna-docusate sodium (SENOKOT S) 8.6-50 mg per tablet   No No   Sig: Take 2 tablets by mouth daily at bedtime   temazepam (RESTORIL) 30 mg capsule   No No   Sig: Take 1 capsule (30 mg total) by mouth daily at bedtime      Facility-Administered Medications: None       Allergies   Allergen Reactions    Penicillins        PHYSICAL EXAM    PE limited by: None    Objective   Vitals:   First set: Temperature: 98.1 °F (36.7 °C) (10/25/23 1115)  Pulse: 82 (10/25/23 1115)  Respirations: 16 (10/25/23 1115)  Blood Pressure: 170/71 (10/25/23 1115)  SpO2: 96 % (10/25/23 1115)    Primary Survey:   (A) Airway: Patent self maintained  (B) Breathing: Good bilateral chest expansion  (C) Circulation: Pulses:   normal  (D) Disabliity:  GCS Total:  15  (E) Expose:  Completed    Secondary Survey: (Click on Physical Exam tab above)  Physical Exam  Vitals and nursing note reviewed. Constitutional:       General: She is not in acute distress. Appearance: She is well-developed. HENT:      Head: Normocephalic and atraumatic. Eyes:      Conjunctiva/sclera: Conjunctivae normal.      Pupils: Pupils are equal, round, and reactive to light. Cardiovascular:      Rate and Rhythm: Normal rate and regular rhythm. Heart sounds: Normal heart sounds. No murmur heard. No friction rub. Pulmonary:      Effort: Pulmonary effort is normal.      Breath sounds: Normal breath sounds. Abdominal:      General: Bowel sounds are normal.      Palpations: Abdomen is soft. Comments: Tenderness palpation left upper quadrant left lower quadrant   Musculoskeletal:         General: Normal range of motion. Cervical back: Normal range of motion and neck supple. Comments: Tenderness palpation left greater trochanter    Large hematoma left lateral thigh   Skin:     General: Skin is warm. Capillary Refill: Capillary refill takes less than 2 seconds. Neurological:      Mental Status: She is alert and oriented to person, place, and time. Motor: No abnormal muscle tone. Coordination: Coordination normal.   Psychiatric:         Behavior: Behavior normal.         Thought Content: Thought content normal.         Cervical spine cleared by clinical criteria?  Yes Invasive Devices       Peripheral Intravenous Line  Duration             Peripheral IV 10/25/23 Right Antecubital <1 day                    Lab Results:   Results Reviewed       Procedure Component Value Units Date/Time    CBC and differential [[id number]]  (Abnormal) Collected: 10/25/23 1149    Lab Status: Final result Specimen: Blood from Arm, Right Updated: 10/25/23 1204     WBC 8.56 Thousand/uL      RBC 3.78 Million/uL      Hemoglobin 11.6 g/dL      Hematocrit 35.8 %      MCV 95 fL      MCH 30.7 pg      MCHC 32.4 g/dL      RDW 13.2 %      MPV 10.3 fL      Platelets 207 Thousands/uL      nRBC 0 /100 WBCs      Neutrophils Relative 65 %      Immat GRANS % 1 %      Lymphocytes Relative 21 %      Monocytes Relative 8 %      Eosinophils Relative 4 %      Basophils Relative 1 %      Neutrophils Absolute 5.59 Thousands/µL      Immature Grans Absolute 0.04 Thousand/uL      Lymphocytes Absolute 1.82 Thousands/µL      Monocytes Absolute 0.66 Thousand/µL      Eosinophils Absolute 0.38 Thousand/µL      Basophils Absolute 0.07 Thousands/µL     Comprehensive metabolic panel [[id number]] Collected: 10/25/23 1149    Lab Status: In process Specimen: Blood from Arm, Right Updated: 10/25/23 1155                   Imaging Studies:   Direct to CT: Yes  TRAUMA - CT abdomen pelvis w contrast   Final Result by Johnson Kohler MD (10/25 1200)      Subcutaneous hematoma in the subcutaneous tissues of the lateral left hip. Areas of increased density within this hematoma seen. No calcifications were seen on the prior study and therefore this likely reflects contrast extravasation and active    hemorrhage. Additional findings as above. Workstation performed: [id number]         XR Trauma chest portable   Final Result by Johnson Kohler MD (10/25 1200)      No acute cardiopulmonary disease.                   Workstation performed: [id number]         XR Trauma pelvis ap only 1 or 2 vw   Final Result by Johnson Kohler MD (10/25 1201) Status post ORIF remote fracture deformity left proximal femur. No acute osseous abnormality. Soft tissue swelling lateral to the left hip.       Workstation performed: [id number]         XR femur 2 views LEFT    (Results Pending)         Procedures  POC FAST US    Date/Time: 10/25/2023 11:29 AM    Performed by: Margie Woodson MD  Authorized by: Margie Woodson MD    Patient location:  ED  Procedure details:     Exam Type:  Diagnostic    Indications: blunt abdominal trauma      Assess for:  Hemothorax, intra-abdominal fluid and pericardial effusion    Views obtained:  RUQ - Claudio's Pouch and Heart - Pericardial sac    Image quality: diagnostic      Image availability:  Images available in PACS  FAST Findings:     RUQ (Hepatorenal) free fluid: absent      LUQ (Splenorenal) free fluid: absent      Suprapubic free fluid: absent      Cardiac wall motion: identified      Pericardial effusion: absent    Interpretation:     Impressions: negative    CriticalCare Time    Date/Time: 10/25/2023 12:14 PM    Performed by: Margie Woodson MD  Authorized by: Margie Woodson MD    Critical care provider statement:     Critical care time (minutes):  45    Critical care time was exclusive of:  Separately billable procedures and treating other patients    Critical care was necessary to treat or prevent imminent or life-threatening deterioration of the following conditions:  Circulatory failure and trauma    Critical care was time spent personally by me on the following activities:  Blood draw for specimens, obtaining history from patient or surrogate, discussions with consultants, examination of patient, evaluation of patient's response to treatment, development of treatment plan with patient or surrogate, ordering and performing treatments and interventions, ordering and review of laboratory studies, ordering and review of radiographic studies and re-evaluation of patient's condition    I assumed direction of critical care for this patient from another provider in my specialty: no             ED Course  ED Course as of 10/25/23 1215   Wed Oct 25, 2023   1203 CT findings reviewed  order placed to discussed with trauma surgery   1208 Patient excepted to trauma surgery   1213 Pressure dressing placed over the left lateral thigh. Medical Decision Making  Amount and/or Complexity of Data Reviewed  Labs: ordered. Radiology: ordered. Decision-making details documented in ED Course. ECG/medicine tests:  Decision-making details documented in ED Course. Risk  Prescription drug management. Disposition  [facility] Transfer: No  Final diagnoses:   Fall, initial encounter   Active internal bleeding   Hematoma of left thigh, initial encounter     Time reflects when diagnosis was documented in both MDM as applicable and the Disposition within this note       Time User Action Codes Description Comment    10/25/2023 12:13 PM Haider Melgar Add [C60. NPMU] Fall, initial encounter     10/25/2023 12:13 PM Aretta Left Add [R58] Active internal bleeding     10/25/2023 12:13 PM Ana Richmond Add [S70.12XA] Hematoma of left thigh, initial encounter           ED Disposition       ED Disposition   Transfer to Another Facility-In Network    Condition   --    Date/Time   Wed Oct 25, 2023 12:08 PM    Comment   Juma Turk should be transferred out to. Follow-up Information    None       Patient's Medications   Discharge Prescriptions    No medications on file     No discharge procedures on file.     PDMP Review         Value Time User    PDMP Reviewed  Yes 11/19/2019  3:14 PM Grisel Garcia PA-C            ED Provider  Electronically Signed by           Haider Melgar MD  10/25/23 2734

## 2023-10-26 ENCOUNTER — HOME HEALTH ADMISSION (OUTPATIENT)
Dept: HOME HEALTH SERVICES | Facility: HOME HEALTHCARE | Age: 83
End: 2023-10-26
Payer: COMMERCIAL

## 2023-10-26 VITALS
WEIGHT: 132.06 LBS | DIASTOLIC BLOOD PRESSURE: 70 MMHG | SYSTOLIC BLOOD PRESSURE: 161 MMHG | HEIGHT: 64 IN | OXYGEN SATURATION: 97 % | TEMPERATURE: 98.4 F | HEART RATE: 79 BPM | BODY MASS INDEX: 22.55 KG/M2 | RESPIRATION RATE: 16 BRPM
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
## 2023-10-26 LAB
ALBUMIN SERPL BCP-MCNC: 3.7 G/DL (ref 3.5–5)
ALP SERPL-CCNC: 63 U/L (ref 34–104)
ALT SERPL W P-5'-P-CCNC: 6 U/L (ref 7–52)
ANION GAP SERPL CALCULATED.3IONS-SCNC: 9 MMOL/L
AST SERPL W P-5'-P-CCNC: 17 U/L (ref 13–39)
BILIRUB SERPL-MCNC: 0.69 MG/DL (ref 0.2–1)
BUN SERPL-MCNC: 19 MG/DL (ref 5–25)
CALCIUM SERPL-MCNC: 8.8 MG/DL (ref 8.4–10.2)
CHLORIDE SERPL-SCNC: 108 MMOL/L (ref 96–108)
CO2 SERPL-SCNC: 22 MMOL/L (ref 21–32)
CREAT SERPL-MCNC: 1.02 MG/DL (ref 0.6–1.3)
ERYTHROCYTE [DISTWIDTH] IN BLOOD BY AUTOMATED COUNT: 13.3 % (ref 11.6–15.1)
GFR SERPL CREATININE-BSD FRML MDRD: 52 ML/MIN/1.73SQ M
GLUCOSE SERPL-MCNC: 106 MG/DL (ref 65–140)
HCT VFR BLD AUTO: 33.9 % (ref 34.8–46.1)
HCT VFR BLD AUTO: 34.4 % (ref 34.8–46.1)
HGB BLD-MCNC: 11.5 G/DL (ref 11.5–15.4)
HGB BLD-MCNC: 11.6 G/DL (ref 11.5–15.4)
MCH RBC QN AUTO: 31.6 PG (ref 26.8–34.3)
MCHC RBC AUTO-ENTMCNC: 33.9 G/DL (ref 31.4–37.4)
MCV RBC AUTO: 93 FL (ref 82–98)
PLATELET # BLD AUTO: 256 THOUSANDS/UL (ref 149–390)
PMV BLD AUTO: 10.8 FL (ref 8.9–12.7)
POTASSIUM SERPL-SCNC: 4.1 MMOL/L (ref 3.5–5.3)
PROT SERPL-MCNC: 6 G/DL (ref 6.4–8.4)
RBC # BLD AUTO: 3.64 MILLION/UL (ref 3.81–5.12)
SODIUM SERPL-SCNC: 139 MMOL/L (ref 135–147)
WBC # BLD AUTO: 8.92 THOUSAND/UL (ref 4.31–10.16)

## 2023-10-26 PROCEDURE — 97163 PT EVAL HIGH COMPLEX 45 MIN: CPT

## 2023-10-26 PROCEDURE — 85014 HEMATOCRIT: CPT

## 2023-10-26 PROCEDURE — 85027 COMPLETE CBC AUTOMATED: CPT

## 2023-10-26 PROCEDURE — 80053 COMPREHEN METABOLIC PANEL: CPT

## 2023-10-26 PROCEDURE — NC001 PR NO CHARGE: Performed by: SURGERY

## 2023-10-26 PROCEDURE — 99238 HOSP IP/OBS DSCHRG MGMT 30/<: CPT | Performed by: PHYSICIAN ASSISTANT

## 2023-10-26 PROCEDURE — 99223 1ST HOSP IP/OBS HIGH 75: CPT | Performed by: INTERNAL MEDICINE

## 2023-10-26 PROCEDURE — 97167 OT EVAL HIGH COMPLEX 60 MIN: CPT

## 2023-10-26 PROCEDURE — 85018 HEMOGLOBIN: CPT

## 2023-10-26 RX ORDER — ACETAMINOPHEN 325 MG/1
975 TABLET ORAL EVERY 8 HOURS SCHEDULED
Refills: 0
Start: 2023-10-26

## 2023-10-26 RX ORDER — ENOXAPARIN SODIUM 100 MG/ML
30 INJECTION SUBCUTANEOUS EVERY 12 HOURS SCHEDULED
Status: DISCONTINUED | OUTPATIENT
Start: 2023-10-26 | End: 2023-10-27 | Stop reason: HOSPADM

## 2023-10-26 RX ORDER — OXYCODONE HYDROCHLORIDE 5 MG/1
5 TABLET ORAL EVERY 8 HOURS PRN
Qty: 15 TABLET | Refills: 0 | Status: SHIPPED | OUTPATIENT
Start: 2023-10-26 | End: 2023-11-05

## 2023-10-26 RX ORDER — POLYETHYLENE GLYCOL 3350 17 G/17G
17 POWDER, FOR SOLUTION ORAL DAILY
Refills: 0
Start: 2023-10-27

## 2023-10-26 RX ADMIN — ACETAMINOPHEN 975 MG: 325 TABLET, FILM COATED ORAL at 21:30

## 2023-10-26 RX ADMIN — OXYCODONE HYDROCHLORIDE 5 MG: 5 TABLET ORAL at 04:18

## 2023-10-26 RX ADMIN — SENNOSIDES 17.2 MG: 8.6 TABLET, FILM COATED ORAL at 10:09

## 2023-10-26 RX ADMIN — MEMANTINE 5 MG: 5 TABLET ORAL at 10:08

## 2023-10-26 RX ADMIN — OXYCODONE HYDROCHLORIDE 5 MG: 5 TABLET ORAL at 10:07

## 2023-10-26 RX ADMIN — AMLODIPINE BESYLATE 5 MG: 5 TABLET ORAL at 10:08

## 2023-10-26 RX ADMIN — ACETAMINOPHEN 650 MG: 325 TABLET, FILM COATED ORAL at 05:33

## 2023-10-26 RX ADMIN — POLYETHYLENE GLYCOL 3350 17 G: 17 POWDER, FOR SOLUTION ORAL at 10:08

## 2023-10-26 RX ADMIN — ENOXAPARIN SODIUM 30 MG: 30 INJECTION SUBCUTANEOUS at 11:37

## 2023-10-26 RX ADMIN — MEMANTINE 5 MG: 5 TABLET ORAL at 17:27

## 2023-10-26 RX ADMIN — ACETAMINOPHEN 975 MG: 325 TABLET, FILM COATED ORAL at 13:51

## 2023-10-26 RX ADMIN — SENNOSIDES, DOCUSATE SODIUM 2 TABLET: 8.6; 5 TABLET ORAL at 21:30

## 2023-10-26 NOTE — ASSESSMENT & PLAN NOTE
Patient s/p fall 10/25  CT abdomen/pelvis on 10/25 with subcutaneous hematoma in the subcutaneous tissues of the left lateral hip with contrast extravasation / active hemorrhage  Compression wrapping in place  Distal pulses intact  Will continue to monitor  Q12 H&H   Stable on 10/26 - started chemical DVT prophylaxis  Will hold asa until follow up clinic visit  Q4 neurovascular checks  PT/OT evaluation pending

## 2023-10-26 NOTE — OCCUPATIONAL THERAPY NOTE
Occupational Therapy Evaluation     Patient Name: Contreras Chambers  DRSGC'L Date: 10/26/2023  Problem List  Principal Problem:    Hip hematoma, left  Active Problems:    Benign essential HTN    Fall    Past Medical History  Past Medical History:   Diagnosis Date    Closed left hip fracture ([address]) 11/14/2019    Hypertension     Postoperative hypotension 11/16/2019    Postoperative hypovolemic shock 11/19/2019     Past Surgical History  Past Surgical History:   Procedure Laterality Date    [place] OPTX FEM SHFT FX W/INSJ IMED IMPLT W/WO SCREW Left 11/15/2019    Procedure: INSERTION NAIL IM FEMUR ANTEGRADE (TROCHANTERIC); Surgeon: Shelba Schlatter, MD;  Location: [address] [place] [address] OR;  Service: Orthopedics         10/26/23 1030   OT Last Visit   OT Visit Date 10/26/23   Note Type   Note type Evaluation   Pain Assessment   Pain Assessment Tool 0-10   Pain Score 8   Pain Location/Orientation Orientation: Left; Location: Hip   Hospital Pain Intervention(s) Repositioned; Ambulation/increased activity; Emotional support;Relaxation technique   Restrictions/Precautions   Weight Bearing Precautions Per Order No   Other Precautions Cognitive; Chair Alarm; Bed Alarm; Fall Risk;Multiple lines   Home Living   Type of Home Mobile home   Home Layout Ramped entrance   Bathroom Shower/Tub Walk-in shower   Bathroom Toilet Standard   Bathroom Equipment Built-in shower seat;Grab bars in [address] Walker;Cane   Prior Function   Level of [place] Independent with ADLs; Independent with functional mobility; Needs assistance with IADLS   Lives With Daughter;Family   Receives Help From Family   IADLs Independent with driving;Family/Friend/Other provides transportation; Family/Friend/Other provides meals; Family/Friend/Other provides medication management   Falls in the last 6 months 1 to 4   Vocational Retired   [address] [place] [address] and mobility with RW, family manages iadls   Reciprocal Relationships supportive family -reports someone is always home with her   Service to Others retired   Intrinsic Gratification sedentary -enjoys watching TV   Subjective   Subjective offers no c/o   ADL   Eating Assistance 5  Supervision/Setup   Grooming Assistance 5  Supervision/Setup   UB Bathing Assistance 4  Minimal Assistance   LB [address]  [address]  4  Minimal Assistance   Bed Mobility   Supine to Sit 4  Minimal assistance   Transfers   Sit to Stand 4  Minimal assistance   Stand to Sit 4  Minimal assistance   Toilet transfer 4  Minimal assistance   Functional Mobility   Functional Mobility 4  Minimal assistance   Additional items Rolling walker   Balance   Static Sitting Fair +   Dynamic Sitting Fair   Static Standing Fair   Dynamic Standing [facility] -   Activity Tolerance   Activity Tolerance Patient limited by fatigue;Patient limited by pain   RUE Assessment   RUE Assessment WFL   LUE Assessment   LUE Assessment WFL   Cognition   Arousal/Participation Arousable; Cooperative   Attention Attends with cues to redirect   Orientation Level Oriented to person;Oriented to place; Disoriented to time   Memory Decreased recall of recent events;Decreased short term memory;Decreased recall of precautions   Following Commands Follows one step commands with increased time or repetition   Comments baseline dementia   Assessment   Limitation Decreased ADL status; Decreased cognition;Decreased endurance;Decreased self-care trans   Prognosis Good   Assessment Pt is a 82 y.o. female who was admitted to [address] [place] [address] on 10/25/2023 with Hip hematoma, left 2* fall. Patient  has a past medical history of Closed left hip fracture ([address]), Hypertension, Postoperative hypotension, and Postoperative hypovolemic shock. At baseline pt was completing basic adls and mobility independently -dtr assists prn and family manges iadls.  Pt lives with dtr/family in mobile home with ramped entrance . Currently pt requires min assist for overall ADLS and min assist for functional mobility/transfers. Pt currently presents with impairments in the following categories -difficulty performing ADLS, limited insight into deficits, decreased initiation and engagement , health management , and environment activity tolerance, endurance, standing balance/tolerance, memory, insight, safety , judgement , attention , and sequencing . These impairments, as well as pt's fatigue, pain, risk for falls, and home environment  limit pt's ability to safely engage in all baseline areas of occupation, includingbathing, dressing, toileting, functional mobility/transfers, community mobility, social participation , and leisure activities  From OT standpoint, recommend home with family support and home OT  pending confirmation that family can meet pt's current care needs -  OT will continue to follow to address the below stated goals. Goals   Patient Goals have less pain   LTG Time Frame 10-14   Long Term Goal #1 refer to established goals below   Plan   Treatment Interventions ADL retraining;Functional transfer training; Endurance training;Cognitive reorientation;Patient/family training;Equipment evaluation/education; Compensatory technique education; Activityengagement   Goal Expiration Date 11/09/23   OT Frequency 2-3x/wk   Discharge Recommendation   OT Discharge Recommendation Home with home health rehabilitation   AM-PAC Daily Activity Inpatient   Lower Body Dressing 3   Bathing 3   Toileting 3   Upper Body Dressing 3   Grooming 4   Eating 4   Daily Activity Raw Score 20   Daily Activity Standardized Score (Calc for Raw Score >=11) 42.03   AM-PAC Applied Cognition Inpatient   Following a Speech/Presentation 2   Understanding Ordinary Conversation 4   Taking Medications 3   Remembering Where Things Are Placed or Put Away 3   Remembering List of 4-5 Errands 2   Taking Care of Complicated Tasks 2   Applied Cognition Raw Score 16   Applied Cognition Standardized Score 35.03   End of Consult   Education Provided Yes   Patient Position at End of Consult Bedside chair;Bed/Chair alarm activated; All needs within reach   Nurse Communication Nurse aware of consult       *I feeding/grooming after setup  *SBA UB/LB bathing after setup with cues PRN to initiate/sequence and complete tasks  *SBA toileting and clothing management  *SBA bed mobility with fair to fair+ sitting balance on EOB to engage in light self care tasks, adls and enjoyable activities  *SBA functional mob/transfers to/from all surfaces with fair to fair+ balance for participation in dynamic adls  *Increase activity tolerance to 30-35 min for participation in adls and enjoyable activities  *Demonstrate good carryover with safe use of RW during functional tasks  *Assess dme needs  *Assist with safe d/c recommendations          The patient's raw score on the AM-PAC Daily Activity Inpatient Short Form is 20. A raw score of greater than or equal to 19 suggests the patient may benefit from discharge to home. Please refer to the recommendation of the Occupational Therapist for safe discharge planning.           [facility]

## 2023-10-26 NOTE — PROGRESS NOTES
[address] [facility]  Progress Note  Name: Opal Spencer I  MRN: [id number]  Unit/Bed#: [facility] 033-65 I Date of Admission: 10/25/2023   Date of Service: 10/26/2023 I Hospital Day: 0    Assessment/Plan   * Hip hematoma, left  Assessment & Plan  Patient s/p fall 10/25  CT abdomen/pelvis on 10/25 with subcutaneous hematoma in the subcutaneous tissues of the left lateral hip with contrast extravasation / active hemorrhage  Compression wrapping in place  Distal pulses intact  Will continue to monitor  Q12 H&H   Stable on 10/26 - started chemical DVT prophylaxis  Q4 neurovascular checks  PT/OT evaluation pending    [address]  S/p fall on 10/25  See note for hip hematoma  CT head & CT cervical spine without any acute abnormality    Benign essential HTN  Assessment & Plan  Patient on amlodipine and benazepril at home  Amlodipine re-ordered  Continue to monitor blood pressure         Bowel Regimen: miralax, senokot  VTE Prophylaxis:Enoxaparin (Lovenox)     Disposition: pending PT/OT evaluation    Subjective   Chief Complaint: S/P fall with left hip hematoma    Subjective: Patient complains of having left hip / thigh pain only with movement, states that pain is currently well controlled. Objective   Vitals:   Temp:  [98.1 °F (36.7 °C)-99.2 °F (37.3 °C)] 98.5 °F (36.9 °C)  HR:  [61-97] 88  Resp:  [16-18] 16  BP: ()/(51-74) 148/74    I/O         10/24 0701  10/25 0700 10/25 0701  10/26 0700 10/26 0701  10/27 0700    P. O.  120 225    Total Intake(mL/kg)  120 (2) 225 (3.8)    Urine (mL/kg/hr)  300     Total Output  300     Net  -180 +225                    Physical Exam:   GENERAL APPEARANCE: Patient in no acute distress. HEENT: NCAT; PERRL, EOMs intact; Mucous membranes moist  CV: Regular rate and rhythm; no murmur/gallops/rubs appreciated. CHEST / LUNGS: Clear to auscultation; no wheezes/rales/rhonci. ABD: NABS; soft; non-distended; non-tender.   : voiding  EXT: +2 pulses bilaterally upper & lower extremities; no edema peripherally. Left thigh with ace bandage for pressure  NEURO: GCS 15; no focal neurologic deficits; neurovascularly intact. SKIN: Warm, dry and well perfused; no rash; no jaundice.       Invasive Devices       Peripheral Intravenous Line  Duration             Peripheral IV 10/25/23 Right Antecubital 1 day              Drain  Duration             External Urinary Catheter <1 day                          Lab Results: Results: I have personally reviewed all pertinent laboratory/tests results, BMP/CMP:   Lab Results   Component Value Date    SODIUM 139 10/26/2023    K 4.1 10/26/2023     10/26/2023    CO2 22 10/26/2023    BUN 19 10/26/2023    CREATININE 1.02 10/26/2023    CALCIUM 8.8 10/26/2023    AST 17 10/26/2023    ALT 6 (L) 10/26/2023    ALKPHOS 63 10/26/2023    EGFR 52 10/26/2023   , and CBC:   Lab Results   Component Value Date    WBC 8.92 10/26/2023    HGB 11.5 10/26/2023    HCT 33.9 (L) 10/26/2023    MCV 93 10/26/2023     10/26/2023    RBC 3.64 (L) 10/26/2023    MCH 31.6 10/26/2023    MCHC 33.9 10/26/2023    RDW 13.3 10/26/2023    MPV 10.8 10/26/2023     Imaging: No new imaging  Other Studies: N/A

## 2023-10-26 NOTE — DISCHARGE INSTR - AVS FIRST PAGE
Trauma Discharge Instructions:    Please follow-up as instructed. If you need a follow-up appointment, please call the office when you leave to schedule an appointment. Hold your home aspirin until your follow up appointment    Activity:  - PT and OT evaluation and treatment as indicated. - You may resume activity as tolerated. - Walking and normal light activities are encouraged. - Normal daily activities including climbing steps are okay. - No driving until no longer using pain medications. Diet:    - You may resume your normal diet. Medications:  - You should continue your current medication regimen after discharge unless otherwise instructed. Please refer to your discharge medication list for further details. - Please take the pain medications as directed. - You are encouraged to use non-narcotic pain medications first and whenever possible. Reserve the use of narcotic pain medication for moderate to severe pain not controlled by non-narcotic medications.  - No driving while taking narcotic pain medications. - You may become constipated, especially if taking pain medications. You may take any over the counter stool softeners or laxatives as needed. Examples: Milk of Magnesia, Colace, Senna. Additional Instructions:  - May shower daily.  - If you have any questions or concerns after discharge please call the office.  - Call office or return to ER if fever greater than 101, chills, persistent nausea/vomiting, worsening/uncontrollable pain, develop productive cough, increasing shortness of breath, difficulty breathing, and/or increasing redness or purulent/foul smelling drainage from incision(s).

## 2023-10-26 NOTE — CONSULTS
Consultation - Geriatric Medicine   Shannan Norman 82 y.o. female MRN: [id number]  [facility] Encounter: [id number]      Assessment/Plan     Ambulatory dysfunction with fall  -reportedly mechanical fall at home 10/25/23  -(-) head strike (-) loss of consciousness  -injuries as outlined below  -Requires use of cane for ambulation at baseline  -endorses hx recurrent falls numerous over past year   -remains high risk future falls due to age, hx fall, deconditioning/debility and unfamiliar environment   -encourage good body mechanics and assist with all transfers  -keep personal items and call bell close to prevent reaching  -maintain environment free of fall hazards  -encourage appropriate footwear and adequate lighting at all times when out of bed  -recommend home fall risk assessment and personal fall alert system if returning home  -PT and OT pending     Left hip hematoma  -s/p fall as outlined above  -CT abd/pelvis on admit reports L lateral hip hematoma with contrast extravasation concerning for active bleeding   -reportedly on ASA as o/p, held and reversed with DDAVP on admit  -AC/AP remains on hold  -trending H/H, stable so far at 11.1 from 11.6 on admit  -LLE neurovascular checks per protocol  -Multimodal pain control    Acute pain due to trauma  -recommend pain control per Geriatric pain protocol:  Tylenol 975mg Q8H scheduled  Roxicodone 2.5mg Q4H PRN moderate pain  Roxicodone 5mg Q4H PRN severe pain  Dilaudid 0.2mg Q4H PRN  -consider adjuncts such as lidocaine patch topically to appropriate areas   -encourage addition of non-pharmacologic pain treatment including ice and frequent repositioning  -recommend  bowel regimen to prevent and treat constipation due to increased risk with acute pain and opiate pain medications    Insomnia  -PDMP reveals pt on Temazepam chronically as o/p, although benzodiazepine use not preferable in older adult population patient appears to be on this medication chronically and should not be abruptly discontinued due to risk withdrawal, recommend continuation for now with close monitoring of mental status and consider long term goal to taper off as o/p  -Consider addition of low-dose melatonin at bedtime  -Encourage consistent sleep/wake times and establishment of bedtime routine  -Avoid stimulating activity/agents late afternoon/early evening to reduce risk disruption of normal circadian rhythm    Cognitive impairment  -alert and oriented to self at time evaluation  -Dependent for ADLs and IADLs at baseline  -no formal prior dx dementia on chart but appears was on Namenda in past suggestive of at least moderate dementia, will need to confirm current baseline and home medications with family when they are available   -CTH obtained on admission images personally viewed, mild bilateral frontal atrophy and mild diffuse chronic microangiopathic changes appreciated  -TSH 1.94 (3/22) and b12 435 (10/21), recommend rechecking both  -Recommend comprehensive drug assessment follow recovery from acute injuries to establish new baseline  -Encourage use of sensory assist devices such as corrective lenses at all appropriate times reduce risk sensory, continue to isolation, confusion, encephalopathy and more precipitous cog decline  -encourage patient remain physically, socially and cognitively active and engaged to maintain cog acuity     Impaired Vision  -recommend use of corrective lenses at all appropriate times  -encourage adequate lighting and encourage use of assistance with ambulation  -keep personal belongings close to person to avoid reaching  -encourage appropriate footwear at all times  -consider large font for printed materials provided to patient     Impaired mastication  -Requires use of dentures - encourage use all appropriate times   -ensure meal consistency appropriate for abilities  -continue aspiration precautions    Deconditioning/debility/frailty  -clinical frailty scale stage V/VI, moderately frail  -multifactorial including age, ambulatory dysfunction with recurrent falls, dementia  -encourage well balanced nutrition intake   -continue optimization of chronic medical conditions and address acute derangements as arise  -monitor for and treat any anxiety/mood/depression symptoms as may impact pt response to treatments and interventions as well as overall sense of wellbeing and quality of life  -ensure treatments and interventions align with patients wishes and goals of care    Delirium precautions  -Patient is high risk of delirium due to age, fall, traumatic injuries, acute pain, dementia, unfamiliar hosp env   -Initiate delirium precautions  -maintain normal sleep/wake cycle  -minimize overnight interruptions, group overnight vitals/labs/nursing checks as possible  -dim lights, close blinds and turn off tv to minimize stimulation and encourage sleep environment in evenings  -ensure that pain is well controlled   -monitor for fecal and urinary retention which may precipitate delirium  -encourage early mobilization and ambulation with assist when cleared to safely do so  -provide frequent reorientation and redirection as indicated and appropriate   -encourage family and friends at the bedside to help help calm patient if anxious  -encourage hydration and nutrition   -redirect unwanted behaviors as first line    Home medication review     Per PDMP Temazepam 30mg daily chronically last filled 10/13/23    Update:     Confirmed with [facility]:     Benazepril 20mg daily   Celebrex 200mg daily  Amlodipine 5mg daily  Temazepam 30mg HS    No longer on Namenda per [employer].     Care coordination: rounded with Tommy Perez (RN)    History of Present Illness   Physician Requesting Consult: Kirk Hawkins MD  Reason for Consult / Principal Problem: Fall  Hx and PE limited by: N/A  Additional history obtained from: Chart review and patient evaluation    HPI: Rebecca Joe is a 82y.o. year old female with hypertension, cognitive impairment, impaired vision, rectal bleeding, chronic left-sided lower back pain without sciatica, chronic left hip pain, insomnia, and ambulatory dysfunction who is admitted to trauma service with fall found to have left hip hematoma on imaging, she is being seen in consultation by Geriatrics for cognitive impairment with high risk developing delirium during hospitalization. She is seen and examined at bedside where she is lying resting comfortably, she is pleasantly confused and unable to provide reliable HPI obtained through extensive chart review and discussion with current care teams. She reportedly sustained a mechanical fall at home on 10/25/2023 without head strike. She presented with pain in the left hip which is now well controlled and she offers no acute complaints. She reports residing home with her daughter, she requires use of cane for ambulation at baseline and has history of recurrent falls. She had advanced dementia and is dependent for ADLs and iADLs at baseline. She wears glasses and dentures. Inpatient consult to Gerontology  Consult performed by: Kortney Rene DO  Consult ordered by: Casandra Cooper DO      Review of Systems   Reason unable to perform ROS: unreliable due to dementia. Constitutional: Negative. Negative for chills and fever. HENT: Negative. Eyes: Negative. Respiratory: Negative. Cardiovascular: Negative. Gastrointestinal: Negative. Genitourinary: Negative. Musculoskeletal:  Positive for gait problem. Recurrent falls    Intermittent L hip pain   Skin: Negative. Neurological:  Negative for weakness. Hematological: Negative. Psychiatric/Behavioral: Negative. All other systems reviewed and are negative.     Historical Information   Past Medical History:   Diagnosis Date    Closed left hip fracture ([address]) 11/14/2019    Hypertension     Postoperative hypotension 11/16/2019    Postoperative hypovolemic shock 11/19/2019     Past Surgical History:   Procedure Laterality Date    [place] OPTX FEM SHFT FX W/INSJ IMED IMPLT W/WO SCREW Left 11/15/2019    Procedure: INSERTION NAIL IM FEMUR ANTEGRADE (TROCHANTERIC); Surgeon: Marcell Begum MD;  Location: [address] [place] [address];  Service: Orthopedics     Social History   Social History     Substance and Sexual Activity   Alcohol Use Not Currently     Social History     Substance and Sexual Activity   Drug Use Never     Social History     Tobacco Use   Smoking Status Never   Smokeless Tobacco Never     Family History: patient unable to state due to dementia, no family available to assist with additional history    Meds/Allergies   all current active meds have been reviewed    Allergies   Allergen Reactions    Penicillins      Objective     Intake/Output Summary (Last 24 hours) at 10/26/2023 0633  Last data filed at 10/26/2023 0536  Gross per 24 hour   Intake 120 ml   Output 300 ml   Net -180 ml     Invasive Devices       Peripheral Intravenous Line  Duration             Peripheral IV 10/25/23 Right Antecubital <1 day              Drain  Duration             External Urinary Catheter <1 day                  Physical Exam  Vitals and nursing note reviewed. Constitutional:       General: She is not in acute distress. Comments: Thin, frail, elderly female    HENT:      Head: Normocephalic. Nose: Nose normal.      Mouth/Throat:      Mouth: Mucous membranes are dry. Comments: Upper denture in place   Eyes:      General:         Right eye: No discharge. Left eye: No discharge. Conjunctiva/sclera: Conjunctivae normal.      Comments: Glasses at bedside table    Neck:      Comments: Trachea midline, phonation normal  Cardiovascular:      Rate and Rhythm: Normal rate. Pulses: Normal pulses. Pulmonary:      Effort: Pulmonary effort is normal. No respiratory distress. Breath sounds: No wheezing.    Abdominal:      General: Bowel sounds are normal. There is no distension. Palpations: Abdomen is soft. Tenderness: There is no abdominal tenderness. Musculoskeletal:      Cervical back: Neck supple. Right lower leg: No edema. Left lower leg: No edema. Comments: Diffuse subcutaneous fat and muscle wasting    L thigh in ACE wrap   Skin:     General: Skin is warm and dry. Neurological:      Mental Status: She is alert.       Comments: Awake and alert, oriented to self, pleasantly confused    No insight into deficits   Psychiatric:      Comments: Impulsive and inattentive, tangential      Lab Results:     I have personally reviewed pertinent lab results including the following:    10/25/23 -CBC, BMP, H/H    I have personally reviewed the following imaging study reports in PACS:    10/25/23-chest x-ray, XR pelvis, CT abdomen pelvis, CT head without contrast, CT C-spine without contrast    Therapies:   PT: Pending  OT: Pending    VTE Prophylaxis: Sequential compression device (Venodyne)     Code Status: Level 1 - Full Code  Advance Directive and Living Will:      Power of :    POLST:      Family and Social Support: daughter     Goals of Care: pain control

## 2023-10-26 NOTE — PLAN OF CARE
Problem: MOBILITY - ADULT  Goal: Maintain or return to baseline ADL function  Description: INTERVENTIONS:  -  Assess patient's ability to carry out ADLs; assess patient's baseline for ADL function and identify physical deficits which impact ability to perform ADLs (bathing, care of mouth/teeth, toileting, grooming, dressing, etc.)  - Assess/evaluate cause of self-care deficits   - Assess range of motion  - Assess patient's mobility; develop plan if impaired  - Assess patient's need for assistive devices and provide as appropriate  - Encourage maximum independence but intervene and supervise when necessary  - Involve family in performance of ADLs  - Assess for home care needs following discharge   - Consider OT consult to assist with ADL evaluation and planning for discharge  - Provide patient education as appropriate  Outcome: Progressing  Goal: Maintains/Returns to pre admission functional level  Description: INTERVENTIONS:  - Perform BMAT or MOVE assessment daily.   - Set and communicate daily mobility goal to care team and patient/family/caregiver. - Collaborate with rehabilitation services on mobility goals if consulted  - Perform Range of Motion 3 times a day. - Reposition patient every 2 hours.   - Dangle patient 3 times a day  - Stand patient 3 times a day  - Ambulate patient 3 times a day  - Out of bed to chair 3 times a day   - Out of bed for meals 3 times a day  - Out of bed for toileting  - Record patient progress and toleration of activity level   Outcome: Progressing     Problem: PAIN - ADULT  Goal: Verbalizes/displays adequate comfort level or baseline comfort level  Description: Interventions:  - Encourage patient to monitor pain and request assistance  - Assess pain using appropriate pain scale  - Administer analgesics based on type and severity of pain and evaluate response  - Implement non-pharmacological measures as appropriate and evaluate response  - Consider cultural and social influences on pain and pain management  - Notify physician/advanced practitioner if interventions unsuccessful or patient reports new pain  Outcome: Progressing     Problem: INFECTION - ADULT  Goal: Absence or prevention of progression during hospitalization  Description: INTERVENTIONS:  - Assess and monitor for signs and symptoms of infection  - Monitor lab/diagnostic results  - Monitor all insertion sites, i.e. indwelling lines, tubes, and drains  - Monitor endotracheal if appropriate and nasal secretions for changes in amount and color  - [place] appropriate cooling/warming therapies per order  - Administer medications as ordered  - Instruct and encourage patient and family to use good hand hygiene technique  - Identify and instruct in appropriate isolation precautions for identified infection/condition  Outcome: Progressing  Goal: Absence of fever/infection during neutropenic period  Description: INTERVENTIONS:  - Monitor WBC    Outcome: Progressing     Problem: SAFETY ADULT  Goal: Maintain or return to baseline ADL function  Description: INTERVENTIONS:  -  Assess patient's ability to carry out ADLs; assess patient's baseline for ADL function and identify physical deficits which impact ability to perform ADLs (bathing, care of mouth/teeth, toileting, grooming, dressing, etc.)  - Assess/evaluate cause of self-care deficits   - Assess range of motion  - Assess patient's mobility; develop plan if impaired  - Assess patient's need for assistive devices and provide as appropriate  - Encourage maximum independence but intervene and supervise when necessary  - Involve family in performance of ADLs  - Assess for home care needs following discharge   - Consider OT consult to assist with ADL evaluation and planning for discharge  - Provide patient education as appropriate  Outcome: Progressing  Goal: Maintains/Returns to pre admission functional level  Description: INTERVENTIONS:  - Perform BMAT or MOVE assessment daily.   - Set and communicate daily mobility goal to care team and patient/family/caregiver. - Collaborate with rehabilitation services on mobility goals if consulted  - Perform Range of Motion 3 times a day. - Reposition patient every 2 hours.   - Dangle patient 3 times a day  - Stand patient 3 times a day  - Ambulate patient 3 times a day  - Out of bed to chair 3 times a day   - Out of bed for meals 3 times a day  - Out of bed for toileting  - Record patient progress and toleration of activity level   Outcome: Progressing  Goal: Patient will remain free of falls  Description: INTERVENTIONS:  - Educate patient/family on patient safety including physical limitations  - Instruct patient to call for assistance with activity   - Consult OT/PT to assist with strengthening/mobility   - Keep Call bell within reach  - Keep bed low and locked with side rails adjusted as appropriate  - Keep care items and personal belongings within reach  - Initiate and maintain comfort rounds  - Make Fall Risk Sign visible to staff  - Offer Toileting every 2 Hours, in advance of need  - Initiate/Maintain bed/chair alarm  - Obtain necessary fall risk management equipment:   - Apply yellow socks and bracelet for high fall risk patients  - Consider moving patient to room near nurses station  Outcome: Progressing     Problem: DISCHARGE PLANNING  Goal: Discharge to home or other facility with appropriate resources  Description: INTERVENTIONS:  - Identify barriers to discharge w/patient and caregiver  - Arrange for needed discharge resources and transportation as appropriate  - Identify discharge learning needs (meds, wound care, etc.)  - Arrange for interpretive services to assist at discharge as needed  - Refer to Case Management Department for coordinating discharge planning if the patient needs post-hospital services based on physician/advanced practitioner order or complex needs related to functional status, cognitive ability, or social support system  Outcome: Progressing     Problem: Knowledge Deficit  Goal: Patient/family/caregiver demonstrates understanding of disease process, treatment plan, medications, and discharge instructions  Description: Complete learning assessment and assess knowledge base.   Interventions:  - Provide teaching at level of understanding  - Provide teaching via preferred learning methods  Outcome: Progressing     Problem: HEMATOLOGIC - ADULT  Goal: Maintains hematologic stability  Description: INTERVENTIONS  - Assess for signs and symptoms of bleeding or hemorrhage  - Monitor labs  - Administer supportive blood products/factors as ordered and appropriate  Outcome: Progressing

## 2023-10-26 NOTE — INCIDENTAL FINDINGS
The following findings require follow up:  Radiographic finding   Finding:  Small hypodense lesion posteriorly in the left kidney measuring 7 mm   Follow up required: Renal US    Follow up should be done within 2 month(s)    I personally discussed these findings with the patient and her daughter. They understand the findings of left renal lesion which could represent an early finding of cancer. They understand the importance of follow up imaging to help determine the significance of this finding and that delay in follow up could result in progression of disease with worsening prognosis and even death.

## 2023-10-26 NOTE — UTILIZATION REVIEW
Initial Clinical Review    Admission: Date/Time/Statement:   Admission Orders (From admission, onward)       Ordered        10/25/23 1455  Place in Observation  Once                          Orders Placed This Encounter   Procedures    Place in Observation     Standing Status:   Standing     Number of Occurrences:   1     Order Specific Question:   Level of Care     Answer:   Med Surg [16]     ED Arrival Information       Expected   10/25/2023     Arrival   10/25/2023 14:05    Acuity   Emergent              Means of arrival   Ambulance    Escorted by   [facility]    Service   Trauma    Admission type   Emergency              Arrival complaint   Trauma             Chief Complaint   Patient presents with    Trauma     See trauma doc         Initial Presentation: 82 y.o. female, Transfer from [address] [place] [address] ED, presents for S/p Fall on aspirin with left thigh hematoma. Pt trip and fell into a table this am. Denies head strike. PMH for HTN. Admit Observation level of care for Fall with Left hip hematoma. 10/25 CT abd/pelvis with subcutaneous hematoma in the subcutaneous tissues of the left lateral hip with contrast extravasation / active hemorrhage. Compression wrapping in place. H&H q12h. Neurovascular checks q4h. Continue home meds. 10/26  Geriatrics cons;  Pt  is high risk of delirium due to age, fall, traumatic injuries, acute pain, dementia, unfamiliar hosp env ./    Progress notes; PT/OT pending    ED Triage Vitals   Temperature Pulse Respirations Blood Pressure SpO2   10/25/23 1410 10/25/23 1409 10/25/23 1409 10/25/23 1409 10/25/23 1409   98.1 °F (36.7 °C) 66 18 143/63 98 %      Temp Source Heart Rate Source Patient Position - Orthostatic VS BP Location FiO2 (%)   10/25/23 1410 10/25/23 1409 10/26/23 0407 10/26/23 0407 --   Oral Monitor Lying Right arm       Pain Score       10/25/23 1410       5          Wt Readings from Last 1 Encounters:   10/25/23 59.9 kg (132 lb 0.9 oz)     Additional Vital Signs: 10/26/23 07:27:56 98.7 °F (37.1 °C) 70 16 144/60 88 98 % -- --   10/26/23 04:07:20 98.9 °F (37.2 °C) 73 18 147/62 90 98 % None (Room air) Lying   10/26/23 0100 -- -- -- -- -- -- None (Room air) --   10/25/23 22:15:09 99.2 °F (37.3 °C) 61 16 137/51 80 97 % -- --   10/25/23 2100 -- 75 -- -- -- 96 % -- --   10/25/23 2000 -- 75 -- -- -- 97 % None (Room air) --   10/25/23 1900 -- 82 -- -- -- 97 % -- --   10/25/23 18:29:10 98.3 °F (36.8 °C) 97 18 147/69 95 98 % --      Pertinent Labs/Diagnostic Test Results:   CT head without contrast   Final Result by ORI Winston MD (10/25 1738)      No acute intracranial abnormality. Workstation performed: [id number]         CT spine cervical wo contrast   Final Result by ORI Winston MD (10/25 1738)      No cervical spine fracture or traumatic malalignment. Workstation performed: [id number]           10/25  PCXR - No acute cardiopulmonary disease. XR Pelvis - Status post ORIF remote fracture deformity left proximal femur. No acute osseous abnormality. Soft tissue swelling lateral to the left hip. CT abd/pelvis w contrast - Subcutaneous hematoma in the subcutaneous tissues of the lateral left hip. Areas of increased density within this hematoma seen. No calcifications were seen on the prior study and therefore this likely reflects contrast extravasation and active   hemorrhage. XR Left Femur - Status post ORIF of the left femur without evidence for hardware complication. CT Head wo contrast - No acute intracranial abnormality.           Results from last 7 days   Lab Units 10/25/23  1510 10/25/23  1149   WBC Thousand/uL  --  8.56   HEMOGLOBIN g/dL 11.1* 11.6   HEMATOCRIT % 32.9* 35.8   PLATELETS Thousands/uL  --  235   NEUTROS ABS Thousands/µL  --  5.59         Results from last 7 days   Lab Units 10/25/23  1149   SODIUM mmol/L 136   POTASSIUM mmol/L 3.8   CHLORIDE mmol/L 106   CO2 mmol/L 21   ANION GAP mmol/L 9   BUN mg/dL 12   CREATININE mg/dL 0.94   EGFR ml/min/1.73sq m 57   CALCIUM mg/dL 8.7     Results from last 7 days   Lab Units 10/25/23  1149   AST U/L 17   ALT U/L 6*   ALK PHOS U/L 59   TOTAL PROTEIN g/dL 5.5*   ALBUMIN g/dL 3.3*   TOTAL BILIRUBIN mg/dL 0.56         Results from last 7 days   Lab Units 10/25/23  1149   GLUCOSE RANDOM mg/dL 112       ED Treatment:   Medication Administration from 10/25/2023 1244 to 10/25/2023 1605         Date/Time Order Dose Route Action     10/25/2023 1505 EDT HYDROmorphone HCl (DILAUDID) injection 0.2 mg 0.2 mg Intravenous Given          Past Medical History:   Diagnosis Date    Closed left hip fracture ([address]) 11/14/2019    Hypertension     Postoperative hypotension 11/16/2019    Postoperative hypovolemic shock 11/19/2019     Present on Admission:   Benign essential HTN      Admitting Diagnosis: Hematoma of left hip, initial encounter [S70.02XA]  Injury, unspecified, initial encounter [T14.90XA]  Age/Sex: 82 y.o. female    Admission Orders:  Scheduled Medications:  acetaminophen, 975 mg, Oral, Q8H [facility]  amLODIPine, 5 mg, Oral, Daily  memantine, 5 mg, Oral, BID  polyethylene glycol, 17 g, Oral, Daily  senna, 2 tablet, Oral, Daily  senna-docusate sodium, 2 tablet, Oral, HS      Continuous IV Infusions:     PRN Meds:  bisacodyl, 10 mg, Rectal, Daily PRN  HYDROmorphone, 0.2 mg, Intravenous, Q4H PRN  ondansetron, 4 mg, Intravenous, Q6H PRN  oxyCODONE, 5 mg, Oral, Q4H PRN  oxyCODONE, 2.5 mg, Oral, Q4H PRN        IP CONSULT TO GERONTOLOGY    Network Utilization Review Department  ATTENTION: Please call with any questions or concerns to [phone] and carefully listen to the prompts so that you are directed to the right person. All voicemails are confidential.   For Discharge needs, contact Care Management DC Support Team at [phone] opt.  2  Send all requests for admission clinical reviews, approved or denied determinations and any other requests to dedicated fax number below belonging to the campus where the patient is receiving treatment.  List of dedicated fax numbers for the Facilities:  [place] DENIALS (Administrative/Medical Necessity) [phone]   DISCHARGE SUPPORT TEAM (NETWORK) [id number] (Maternity/NICU/Pediatrics) [phone]   [address] [place] [address] Drive 1521 [facility] 1000 [facility] [phone]   [address] 5220 [facility] [address] [address] [facility] [address] [phone]

## 2023-10-26 NOTE — CASE MANAGEMENT
Case Management Assessment & Discharge Planning Note    Patient name Radhames Peñaloza  Location [address] 621/[facility] 303-29 MRN [id number]  : 1940 Date 10/26/2023       Current Admission Date: 10/25/2023  Current Admission Diagnosis:Hip hematoma, left   Patient Active Problem List    Diagnosis Date Noted    Hip hematoma, left 10/25/2023    Cognitive decline 2021    Acute lower GI bleeding 2021    Benign essential HTN 2019      LOS (days): 0  Geometric Mean LOS (GMLOS) (days):   Days to GMLOS:     OBJECTIVE:          Current admission status: Observation       Preferred Pharmacy:   [address] [place] [address], [facility] [address]  [address] [facility]. [address] Karin BLAIR [address]  Phone: [phone] Fax: 1451 [facility], [address] [place] [address] [address] [place] [address]  Phone: [phone] Fax: [phone]    Primary Care Provider: Saint Peppers, DO    Primary Insurance: [facility]  Secondary Insurance:     ASSESSMENT:  [place] Proxies    There are no active Health Care Proxies on file.        Advance Directives  Does patient have a [address]?: No  Was patient offered paperwork?: Yes  Does patient currently have a Health Care decision maker?: Yes, please see Health Care Proxy section  Does patient have Advance Directives?: No  Was patient offered paperwork?: Yes  Primary Contact: Fawn Latrice (Daughter) [phone] (H) [phone]      Readmission Root Cause  30 Day Readmission: No    Patient Information  Admitted from[de-identified] Home  Mental Status: Alert  During Assessment patient was accompanied by: Not accompanied during assessment  Assessment information provided by[de-identified] Patient  Support Systems: Self, Daughter, Family members  [facility]: [facility] do you live in?:  [place]  In the last 12 months, was there a time when you were not able to pay the mortgage or rent on time?: No  In the last 12 months, how many places have you lived?: 1  In the last 12 months, was there a time when you did not have a steady place to sleep or slept in a shelter (including now)?: No  Homeless/housing insecurity resource given?: N/A  Living Arrangements: Lives w/ Daughter  Is patient a ?: No    Activities of Daily Living Prior to Admission  Functional Status: Independent  Completes ADLs independently?: Yes  Ambulates independently?: Yes  Does patient use assisted devices?: Yes  Assisted Devices (DME) used: Jennifer Khat  Does patient currently own DME?: Yes  What DME does the patient currently own?: Jennifer Khat, Wheelchair, Straight Cane  Does patient have a history of Outpatient Therapy (PT/OT)?: Yes  Does the patient have a history of Short-Term Rehab?: Yes  Does patient have a history of HHC?: No  Does patient currently have [address]?: No    Patient Information Continued  Income Source: Pension/shelter  Does patient have prescription coverage?: Yes  Within the past 12 months, you worried that your food would run out before you got the money to buy more.: Never true  Within the past 12 months, the food you bought just didn't last and you didn't have money to get more.: Never true  Food insecurity resource given?: N/A  Does patient receive dialysis treatments?: No  Does patient have a history of substance abuse?: No  Does patient have a history of Mental Health Diagnosis?: No    Means of Transportation  Means of Transport to Appts[de-identified] Family transport  In the past 12 months, has lack of transportation kept you from medical appointments or from getting medications?: No  In the past 12 months, has lack of transportation kept you from meetings, work, or from getting things needed for daily living?: No  Was application for public transport provided?: N/A    DISCHARGE DETAILS:    Discharge planning discussed with[de-identified] Karen Ureña (Daughter) [phone] (H) [phone]  Freedom of Choice: Yes     CM contacted family/caregiver?: Yes  Were Treatment Team discharge recommendations reviewed with patient/caregiver?: Yes     Were patient/caregiver advised of the risks associated with not following Treatment Team discharge recommendations?: Yes    Contacts  Patient Contacts: Rosalee Poe (Daughter) [phone] (H) [phone]  Relationship to Patient[de-identified] Family  Contact Method: Phone  Phone Number: [phone] Armond Garcia  Reason/Outcome: Continuity of Care, Emergency Contact, Discharge Planning    DME Referral Provided  Referral made for DME?: No    Other Referral/Resources/Interventions Provided:  Interventions: [facility] AT [facility]    Treatment Team Recommendation: Home with [address]  Discharge Destination Plan[de-identified] Home with [address] spoke to pt's dtr to discuss the role of CM  Pt was evaluated by OT/PT and recommended for a home d/c with [facility]NA, if the pt has home support  Pt has someone at the home 24/7  Pt has access to a walker, SPC and wheelchair. Pt's dtr prefers to bring the pt home tonight. Pt's dtr works until 2030 and then will   [facility]      CM reviewed d/c planning process including the following: identifying help at home, patient preference for d/c planning needs, Discharge Lounge, [facility] Meds to Bed program, availability of treatment team to discuss questions or concerns patient and/or family may have regarding understanding medications and recognizing signs and symptoms once discharged. CM also encouraged patient to follow up with all recommended appointments after discharge. Patient advised of importance for patient and family to participate in managing patient’s medical well being.

## 2023-10-26 NOTE — PHYSICAL THERAPY NOTE
PHYSICAL THERAPY EVALUATION  NAME:  Rebecca Joe  DATE: 10/26/23    AGE:   82 y.o. Mrn:   [id number]  ADMIT DX:  Hematoma of left hip, initial encounter [S70.02XA]  Injury, unspecified, initial encounter Scott Peoples    Past Medical History:   Diagnosis Date    Closed left hip fracture ([address]) 11/14/2019    Hypertension     Postoperative hypotension 11/16/2019    Postoperative hypovolemic shock 11/19/2019     Past Surgical History:   Procedure Laterality Date    RI OPTX FEM SHFT FX W/INSJ IMED IMPLT W/WO SCREW Left 11/15/2019    Procedure: INSERTION NAIL IM FEMUR ANTEGRADE (TROCHANTERIC); Surgeon: Oniel Umanzor MD;  Location: [facility] MAIN OR;  Service: Orthopedics       Length Of Stay: 0  PHYSICAL THERAPY EVALUATION :    10/26/23 1025   PT Last Visit   PT Visit Date 10/26/23   Note Type   Note type Evaluation   Pain Assessment   Pain Assessment Tool 0-10   Pain Score 8   Pain Location/Orientation Orientation: Left; Location: Hip   Pain Onset/Description Descriptor: Sore   Effect of Pain on Daily Activities limtied ambualtion / guarding   Patient's Stated Pain Goal No pain   Restrictions/Precautions   Weight Bearing Precautions Per Order No   Braces or Orthoses   (none)   Other Precautions Cognitive; Chair Alarm; Bed Alarm;Multiple lines;Telemetry; Fall Risk;Pain   Home Living   Type of [address] entrance;Performs ADLs on one level   Bathroom Shower/Tub Walk-in shower   Bathroom Equipment Shower chair;Built-in shower seat;Grab bars in shower   [address] Walker;Cane   Prior Function   Level of [place] Independent with ADLs; Independent with functional mobility; Needs assistance with IADLS   Lives With Daughter  (and daughter's SO.)   Receives Help From Family   IADLs Family/Friend/Other provides transportation; Family/Friend/Other provides meals; Family/Friend/Other provides medication management   Falls in the last 6 months 1 to 4   General Family/Caregiver Present No   Cognition   Overall Cognitive Status Impaired   Arousal/Participation Alert   Orientation Level Oriented to person;Oriented to place;Oriented to situation   Memory Decreased recall of recent events;Decreased short term memory   Following Commands Follows one step commands without difficulty   Comments pleasant and cooperative; able to follow directions and makes needs known - has hx of dementia   RUE Assessment   RUE Assessment WFL   LUE Assessment   LUE Assessment WFL   RLE Assessment   RLE Assessment WFL   LLE Assessment   LLE Assessment WFL  (pain limited at hip - functional)   Light Touch   RLE Light Touch Grossly intact   LLE Light Touch Grossly intact   Sharp/Dull   RLE Sharp/Dull Grossly intact   LLE Sharp/Dull Grossly intact   Proprioception   RLE Proprioception Grossly intact   LLE Proprioception Grossly Intact   Bed Mobility   Supine to Sit 4  Minimal assistance   Transfers   Sit to Stand 5  Supervision   Stand to Sit 5  Supervision   Toilet transfer 5  Supervision   Ambulation/Elevation   Gait pattern Antalgic;Decreased foot clearance   Gait Assistance 5  Supervision  (CGA)   Assistive Device Rolling walker   Distance 25'x2   Balance   Static Sitting Fair +   Dynamic Sitting Fair   Static Standing Fair -   Dynamic Standing Fair -   Ambulatory Fair -  (rw)   Endurance Deficit   Endurance Deficit Yes   Activity Tolerance   Activity Tolerance Patient limited by fatigue;Patient limited by pain   Assessment   Prognosis Fair   Problem List Decreased strength;Decreased endurance; Impaired balance;Decreased mobility; Decreased cognition; Impaired judgement;Decreased safety awareness;Pain   Assessment Pt is 82 y.o. female seen for PT evaluation s/p admit to [address] [place] [address] on 10/25/2023. Pt presenting w/ s/p fall onto table w/ resulting L lateral hip hematoma. PT consulted for mobility and d/c planning recommendations.  Pt   has a past medical history of Closed left hip fracture ([address]) (11/14/2019), Hypertension, Postoperative hypotension (11/16/2019), and Postoperative hypovolemic shock (11/19/2019).  + dementia. Comorbidities affecting pt's physical performance at time of assessment listed above   Due to acute medical issues, ongoing medical workup for primary dx; pain, fall risk,  impaired cognition;  pain; increased reliance on more restrictive AD compared to baseline;  decreased activity tolerance compared to baseline, increased assistance needed from caregiver at current time, continuous telemetry monitoring, multiple lines, decline in overall functional mobility status; health management issues; note unstable clinical picture (high complexity)   Prior to admission, pt was living w/ daughter and daughter's SO in a mobile home w/ ramp entry. [employer] drives and pt reports having daughter or SO home w/ her at most times for supervision. Daughter does cooking and cleaning and pt uses SPC or RW for household mobility at baseline- pt reports she will be "using walker from now on" . Currently pt  is requiring Sky A for bed skills; Sky for functional transfers and min/ CGA for ambulation w/ RW household distance w/ cues for safety. Pt presents functioning below baseline and currently w/ overall mobility deficits 2* to: pain in L lateral hip area; generalized weakness/ deconditioning; decreased endurance; decreased activity tolerance; decreased coordination; impaired balance; gait deviations; decreased safety awareness; ; impaired safety and judgement; limited insight into current deficits; bed/ chair alarms; multiple lines; Pt currently at risk for falls. (Please find additional objective findings from PT assessment regarding body systems outlined above.) Pt will continue to benefit from skilled PT interventions to address stated impairments; to maximize functional potential; for ongoing pt/ family training; and DME needs.  Anticipate that with continued progress pt to d/c home with increased family support and C PT/OT and use of RW when medically cleared. Pt/ family agreeable to plan and goals as stated on evaluation. Barriers to Discharge None   Goals   Patient Goals less pain   STG Expiration Date 11/09/23   Short Term Goal #1 In 14days pt will complete: 1) Bed mobility skills with S to facilitate safe return to previous living environment and decrease burden on caregivers. 2) Functional transfers with S  to facilitate safe return to previous living environment  3) Ambulation with least restrictive AD S> 150'x2' without LOB and stable vitals for safe ambulation in home/ community environment. 4)  Improve balance by 1 grade in order to decrease fall risk.  ) Improve LE strength grades by 1 to increase independence w/ all functional mobility, transfers and gait. ) PT for ongoing pt and family education; DME needs and D/C planning to promote highest level of function in least restrictive environment. Plan   Treatment/Interventions ADL retraining;Functional transfer training;LE strengthening/ROM; Therapeutic exercise; Endurance training;Cognitive reorientation;Patient/family training;Equipment eval/education; Bed mobility;Gait training; Compensatory technique education;Spoke to nursing;Spoke to case management;Spoke to advanced practitioner;OT   PT Frequency 3-5x/wk   Discharge Recommendation   PT Discharge Recommendation Home with home health rehabilitation   Equipment Recommended Walker  (pt reports having RW for d/c - CM to confirm w/ daughter)   Oleta Goltz Recommended Wheeled walker   AM-PAC Basic Mobility Inpatient   Turning in Flat Bed Without Bedrails 3   Lying on Back to Sitting on Edge of Flat Bed Without Bedrails 3   Moving Bed to Chair 3   Standing Up From Chair Using Arms 3   Walk in Room 3   Climb 3-5 Stairs With Railing 2   Basic Mobility Inpatient Raw Score 17   Basic Mobility Standardized Score 39.67   Highest Level Of Mobility   -[facility] Goal 5: Stand one or more mins   JH-HLM Achieved 7: Walk 25 feet or more   End of Consult   Patient Position at End of Consult Bedside chair;Bed/Chair alarm activated; All needs within reach       The patient's AM-PAC Basic Mobility Inpatient Short Form Raw Score is 17. A Raw score of greater than 16 suggests the patient may benefit from discharge to home. Please also refer to the recommendation of the Physical Therapist for safe discharge planning.

## 2023-10-26 NOTE — ASSESSMENT & PLAN NOTE
S/p fall on 10/25  See note for hip hematoma  CT head & CT cervical spine without any acute abnormality

## 2023-10-26 NOTE — ASSESSMENT & PLAN NOTE
Patient s/p fall 10/25  CT abdomen/pelvis on 10/25 with subcutaneous hematoma in the subcutaneous tissues of the left lateral hip with contrast extravasation / active hemorrhage  Compression wrapping in place  Distal pulses intact  Will continue to monitor  Q12 H&H   Stable on 10/26 - started chemical DVT prophylaxis  Q4 neurovascular checks  PT/OT evaluation pending

## 2023-10-26 NOTE — PLAN OF CARE
Problem: OCCUPATIONAL THERAPY ADULT  Goal: Performs self-care activities at highest level of function for planned discharge setting. See evaluation for individualized goals. Description: Treatment Interventions: ADL retraining, Functional transfer training, Endurance training, Cognitive reorientation, Patient/family training, Equipment evaluation/education, Compensatory technique education, Activityengagement          See flowsheet documentation for full assessment, interventions and recommendations. Note: Limitation: Decreased ADL status, Decreased cognition, Decreased endurance, Decreased self-care trans  Prognosis: Good  Assessment: Pt is a 82 y.o. female who was admitted to [facility] on 10/25/2023 with Hip hematoma, left 2* fall. Patient  has a past medical history of Closed left hip fracture ([address]), Hypertension, Postoperative hypotension, and Postoperative hypovolemic shock. At baseline pt was completing basic adls and mobility independently -dtr assists prn and family manges iadls. Pt lives with dtr/family in mobile home with ramped entrance . Currently pt requires min assist for overall ADLS and min assist for functional mobility/transfers. Pt currently presents with impairments in the following categories -difficulty performing ADLS, limited insight into deficits, decreased initiation and engagement , health management , and environment activity tolerance, endurance, standing balance/tolerance, memory, insight, safety , judgement , attention , and sequencing .  These impairments, as well as pt's fatigue, pain, risk for falls, and home environment  limit pt's ability to safely engage in all baseline areas of occupation, includingbathing, dressing, toileting, functional mobility/transfers, community mobility, social participation , and leisure activities  From OT standpoint, recommend home with family support and home OT  pending confirmation that family can meet pt's current care needs -  OT will continue to follow to address the below stated goals.      OT Discharge Recommendation: Home with home health rehabilitation

## 2023-10-26 NOTE — DISCHARGE SUMMARY
[address] [facility]  Discharge- Jose Manuel Campbell 1940, 82 y.o. female MRN: [id number]  [facility]: [id number]  Primary Care Provider: Elizabeth Matthew DO   Date and time admitted to hospital: 10/25/2023  2:05 PM    [address]  S/p fall on 10/25  See note for hip hematoma  CT head & CT cervical spine without any acute abnormality    * Hip hematoma, left  Assessment & Plan  Patient s/p fall 10/25  CT abdomen/pelvis on 10/25 with subcutaneous hematoma in the subcutaneous tissues of the left lateral hip with contrast extravasation / active hemorrhage  Compression wrapping in place  Distal pulses intact  Will continue to monitor  Q12 H&H   Stable on 10/26 - started chemical DVT prophylaxis  Will hold asa until follow up clinic visit  Q4 neurovascular checks  PT/OT evaluation pending    Benign essential HTN  Assessment & Plan  Patient on amlodipine and benazepril at home  Amlodipine re-ordered  Continue to monitor blood pressure              Medical Problems       Resolved Problems  Date Reviewed: 11/7/2021   None         Admission Date:   Admission Orders (From admission, onward)       Ordered        10/25/23 1455  Place in Observation  Once                            Admitting Diagnosis: Hematoma of left hip, initial encounter [S70.02XA]  Injury, unspecified, initial encounter [facility]soxkenn[facility] Loco    HPI: From H&P by Dr. Jordan Humphrey on 10/25: "Jose Manuel Campbell is a 82 y.o. female who presents with left thigh hematoma after fall. Per chart review from [address] [place] [address] ED patient is on ASA and presented after trip and fall into a table this morning. Patient denied head strike at that time. "    Procedures Performed: No orders of the defined types were placed in this encounter. Summary of Hospital Course: Patient is an 79-year-old female who presented after tripping and falling striking her left leg on a table.   She was initially evaluated at [facility] and found to have a left thigh hematoma with active extravasation of contrast.  She was transferred to [facility] and admitted to the trauma service for monitoring. Her aspirin was reversed with DDAVP and repeat hemoglobins were stable x2. She was started on DVT prophylaxis and remained stable and on 10/26 she was medically stable for discharge to home with home health care. Significant Findings, Care, Treatment and Services Provided: Left thigh hematoma on asa, given DDAVP with stable hgbs. Complications: None    Condition at Discharge: good         Discharge instructions/Information to patient and family:   See after visit summary for information provided to patient and family. Provisions for Follow-Up Care:  See after visit summary for information related to follow-up care and any pertinent home health orders. PCP: Mukesh Nuñez DO    Disposition: Home    Planned Readmission: No    Discharge Statement   I spent 25 minutes discharging the patient. This time was spent on the day of discharge. I had direct contact with the patient on the day of discharge. Additional documentation is required if more than 30 minutes were spent on discharge. Discharge Medications:  See after visit summary for reconciled discharge medications provided to patient and family.

## 2023-10-26 NOTE — PLAN OF CARE
Problem: PHYSICAL THERAPY ADULT  Goal: Performs mobility at highest level of function for planned discharge setting. See evaluation for individualized goals. Description: Treatment/Interventions: ADL retraining, Functional transfer training, LE strengthening/ROM, Therapeutic exercise, Endurance training, Cognitive reorientation, Patient/family training, Equipment eval/education, Bed mobility, Gait training, Compensatory technique education, Spoke to nursing, Spoke to case management, Spoke to advanced practitioner, OT  Equipment Recommended: Jennfier Gamble (pt reports having RW for d/c - CM to confirm w/ daughter)       See flowsheet documentation for full assessment, interventions and recommendations. Note: Prognosis: Fair  Problem List: Decreased strength, Decreased endurance, Impaired balance, Decreased mobility, Decreased cognition, Impaired judgement, Decreased safety awareness, Pain  Assessment: Pt is 82 y.o. female seen for PT evaluation s/p admit to [address] [place] [address] on 10/25/2023. Pt presenting w/ s/p fall onto table w/ resulting L lateral hip hematoma. PT consulted for mobility and d/c planning recommendations. Pt   has a past medical history of Closed left hip fracture ([address]) (11/14/2019), Hypertension, Postoperative hypotension (11/16/2019), and Postoperative hypovolemic shock (11/19/2019).  + dementia.  Comorbidities affecting pt's physical performance at time of assessment listed above   Due to acute medical issues, ongoing medical workup for primary dx; pain, fall risk,  impaired cognition;  pain; increased reliance on more restrictive AD compared to baseline;  decreased activity tolerance compared to baseline, increased assistance needed from caregiver at current time, continuous telemetry monitoring, multiple lines, decline in overall functional mobility status; health management issues; note unstable clinical picture (high complexity)   Prior to admission, pt was living w/ daughter and daughter's SO in a mobile home w/ ramp entry. [employer] drives and pt reports having daughter or SO home w/ her at most times for supervision. Daughter does cooking and cleaning and pt uses SPC or RW for household mobility at baseline- pt reports she will be "using walker from now on" . Currently pt  is requiring Sky A for bed skills; Sky for functional transfers and min/ CGA for ambulation w/ RW household distance w/ cues for safety. Pt presents functioning below baseline and currently w/ overall mobility deficits 2* to: pain in L lateral hip area; generalized weakness/ deconditioning; decreased endurance; decreased activity tolerance; decreased coordination; impaired balance; gait deviations; decreased safety awareness; ; impaired safety and judgement; limited insight into current deficits; bed/ chair alarms; multiple lines; Pt currently at risk for falls. (Please find additional objective findings from PT assessment regarding body systems outlined above.) Pt will continue to benefit from skilled PT interventions to address stated impairments; to maximize functional potential; for ongoing pt/ family training; and DME needs. Anticipate that with continued progress pt to d/c home with increased family support and C PT/OT and use of RW when medically cleared. Pt/ family agreeable to plan and goals as stated on evaluation. Barriers to Discharge: None     PT Discharge Recommendation: Home with home health rehabilitation    See flowsheet documentation for full assessment.

## 2023-10-27 NOTE — PLAN OF CARE
Problem: MOBILITY - ADULT  Goal: Maintain or return to baseline ADL function  Description: INTERVENTIONS:  -  Assess patient's ability to carry out ADLs; assess patient's baseline for ADL function and identify physical deficits which impact ability to perform ADLs (bathing, care of mouth/teeth, toileting, grooming, dressing, etc.)  - Assess/evaluate cause of self-care deficits   - Assess range of motion  - Assess patient's mobility; develop plan if impaired  - Assess patient's need for assistive devices and provide as appropriate  - Encourage maximum independence but intervene and supervise when necessary  - Involve family in performance of ADLs  - Assess for home care needs following discharge   - Consider OT consult to assist with ADL evaluation and planning for discharge  - Provide patient education as appropriate  Outcome: Progressing  Goal: Maintains/Returns to pre admission functional level  Description: INTERVENTIONS:  - Perform BMAT or MOVE assessment daily.   - Set and communicate daily mobility goal to care team and patient/family/caregiver.    - Collaborate with rehabilitation services on mobility goals if consulted  - Reposition patient   - Out of bed for toileting  - Record patient progress and toleration of activity level   Outcome: Progressing     Problem: PAIN - ADULT  Goal: Verbalizes/displays adequate comfort level or baseline comfort level  Description: Interventions:  - Encourage patient to monitor pain and request assistance  - Assess pain using appropriate pain scale  - Administer analgesics based on type and severity of pain and evaluate response  - Implement non-pharmacological measures as appropriate and evaluate response  - Consider cultural and social influences on pain and pain management  - Notify physician/advanced practitioner if interventions unsuccessful or patient reports new pain  Outcome: Progressing     Problem: INFECTION - ADULT  Goal: Absence or prevention of progression during hospitalization  Description: INTERVENTIONS:  - Assess and monitor for signs and symptoms of infection  - Monitor lab/diagnostic results  - Monitor all insertion sites, i.e. indwelling lines, tubes, and drains  - Monitor endotracheal if appropriate and nasal secretions for changes in amount and color  - [place] appropriate cooling/warming therapies per order  - Administer medications as ordered  - Instruct and encourage patient and family to use good hand hygiene technique  - Identify and instruct in appropriate isolation precautions for identified infection/condition  Outcome: Progressing  Goal: Absence of fever/infection during neutropenic period  Description: INTERVENTIONS:  - Monitor WBC    Outcome: Progressing     Problem: SAFETY ADULT  Goal: Maintain or return to baseline ADL function  Description: INTERVENTIONS:  -  Assess patient's ability to carry out ADLs; assess patient's baseline for ADL function and identify physical deficits which impact ability to perform ADLs (bathing, care of mouth/teeth, toileting, grooming, dressing, etc.)  - Assess/evaluate cause of self-care deficits   - Assess range of motion  - Assess patient's mobility; develop plan if impaired  - Assess patient's need for assistive devices and provide as appropriate  - Encourage maximum independence but intervene and supervise when necessary  - Involve family in performance of ADLs  - Assess for home care needs following discharge   - Consider OT consult to assist with ADL evaluation and planning for discharge  - Provide patient education as appropriate  Outcome: Progressing  Goal: Maintains/Returns to pre admission functional level  Description: INTERVENTIONS:  - Perform BMAT or MOVE assessment daily.   - Set and communicate daily mobility goal to care team and patient/family/caregiver.    - Collaborate with rehabilitation services on mobility goals if consulted  - Out of bed for toileting  - Record patient progress and toleration of activity level   Outcome: Progressing  Goal: Patient will remain free of falls  Description: INTERVENTIONS:  - Educate patient/family on patient safety including physical limitations  - Instruct patient to call for assistance with activity   - Consult OT/PT to assist with strengthening/mobility   - Keep Call bell within reach  - Keep bed low and locked with side rails adjusted as appropriate  - Keep care items and personal belongings within reach  - Initiate and maintain comfort rounds  - Make Fall Risk Sign visible to staff  - Offer Toileting   - Apply yellow socks and bracelet for high fall risk patients  - Consider moving patient to room near nurses station  Outcome: Progressing     Problem: DISCHARGE PLANNING  Goal: Discharge to home or other facility with appropriate resources  Description: INTERVENTIONS:  - Identify barriers to discharge w/patient and caregiver  - Arrange for needed discharge resources and transportation as appropriate  - Identify discharge learning needs (meds, wound care, etc.)  - Arrange for interpretive services to assist at discharge as needed  - Refer to Case Management Department for coordinating discharge planning if the patient needs post-hospital services based on physician/advanced practitioner order or complex needs related to functional status, cognitive ability, or social support system  Outcome: Progressing     Problem: Knowledge Deficit  Goal: Patient/family/caregiver demonstrates understanding of disease process, treatment plan, medications, and discharge instructions  Description: Complete learning assessment and assess knowledge base.   Interventions:  - Provide teaching at level of understanding  - Provide teaching via preferred learning methods  Outcome: Progressing     Problem: HEMATOLOGIC - ADULT  Goal: Maintains hematologic stability  Description: INTERVENTIONS  - Assess for signs and symptoms of bleeding or hemorrhage  - Monitor labs  - Administer supportive blood products/factors as ordered and appropriate  Outcome: Progressing

## 2023-10-27 NOTE — UTILIZATION REVIEW
NOTIFICATION OF OBSERVATION ADMISSION   AUTHORIZATION REQUEST   SERVICING FACILITY:   [address] [place] [address]  Address: [address] [facility], [address] [place] [address] [address] [id number]  Tax ID: [id number]  NPI: [id number] ATTENDING PROVIDER:  Attending Name and NPI#: Dinora Ahuja Md [[id number]]  Address: [address]  Phone: [phone]   ADMISSION INFORMATION:  Place of Service: On [address] [facility] Code: 22 CPT Code:   Admitting Diagnosis Code/Description:  Hematoma of left hip, initial encounter [S70.02XA]  Injury, unspecified, initial encounter [facility]  Observation Admission Date/Time:   Discharge Date/Time: 10/26/2023 10:16 PM     UTILIZATION REVIEW CONTACT:  Holden Cuellar Utilization   Network Utilization Review Department  Phone: [phone]  Fax: [phone]  Email: [email or web address]   Contact for approvals/pending authorizations, clinical reviews, and discharge. PHYSICIAN ADVISORY SERVICES:  Medical Necessity Denial & [employer] Review  Phone: [phone]  Fax: [phone]  Email: [email or web address]     DISCHARGE SUPPORT TEAM:  For Patients Discharge Needs & Updates  Phone: [phone] opt. 2 Fax: [phone]  Email: [email or web address]

## 2023-10-29 ENCOUNTER — HOME CARE VISIT (OUTPATIENT)
Dept: HOME HEALTH SERVICES | Facility: HOME HEALTHCARE | Age: 83
End: 2023-10-29

## 2023-10-30 ENCOUNTER — HOME CARE VISIT (OUTPATIENT)
Dept: HOME HEALTH SERVICES | Facility: HOME HEALTHCARE | Age: 83
End: 2023-10-30

## 2023-10-31 ENCOUNTER — HOME CARE VISIT (OUTPATIENT)
Dept: HOME HEALTH SERVICES | Facility: HOME HEALTHCARE | Age: 83
End: 2023-10-31
Payer: COMMERCIAL

## 2023-10-31 PROCEDURE — G0299 HHS/HOSPICE OF RN EA 15 MIN: HCPCS

## 2023-10-31 PROCEDURE — 400013 VN SOC

## 2023-11-03 ENCOUNTER — HOME CARE VISIT (OUTPATIENT)
Dept: HOME HEALTH SERVICES | Facility: HOME HEALTHCARE | Age: 83
End: 2023-11-03
Payer: COMMERCIAL

## 2023-11-03 VITALS
HEART RATE: 84 BPM | DIASTOLIC BLOOD PRESSURE: 54 MMHG | TEMPERATURE: 97.4 F | RESPIRATION RATE: 18 BRPM | OXYGEN SATURATION: 97 % | SYSTOLIC BLOOD PRESSURE: 100 MMHG

## 2023-11-03 VITALS — HEART RATE: 78 BPM | DIASTOLIC BLOOD PRESSURE: 64 MMHG | OXYGEN SATURATION: 98 % | SYSTOLIC BLOOD PRESSURE: 112 MMHG

## 2023-11-03 PROCEDURE — G0151 HHCP-SERV OF PT,EA 15 MIN: HCPCS

## 2023-11-03 PROCEDURE — G0299 HHS/HOSPICE OF RN EA 15 MIN: HCPCS

## 2023-11-03 NOTE — CASE COMMUNICATION
PT initial evaluation completed. Pt demonstrates strength deficits ble, balance deficits with pt at elevated fall risk leading to difficulties with gait/transfers and diminished endurance. Plan to see 1xwkx1, 2xwkx2 to address functional deficits, provide education for home safety and HEP.

## 2023-11-05 ENCOUNTER — HOME CARE VISIT (OUTPATIENT)
Dept: HOME HEALTH SERVICES | Facility: HOME HEALTHCARE | Age: 83
End: 2023-11-05
Payer: COMMERCIAL

## 2023-11-06 ENCOUNTER — HOME CARE VISIT (OUTPATIENT)
Dept: HOME HEALTH SERVICES | Facility: HOME HEALTHCARE | Age: 83
End: 2023-11-06
Payer: COMMERCIAL

## 2023-11-06 VITALS
RESPIRATION RATE: 20 BRPM | TEMPERATURE: 97.9 F | HEART RATE: 64 BPM | OXYGEN SATURATION: 98 % | SYSTOLIC BLOOD PRESSURE: 156 MMHG | DIASTOLIC BLOOD PRESSURE: 78 MMHG

## 2023-11-06 VITALS — HEART RATE: 73 BPM | DIASTOLIC BLOOD PRESSURE: 62 MMHG | SYSTOLIC BLOOD PRESSURE: 116 MMHG | OXYGEN SATURATION: 97 %

## 2023-11-06 PROCEDURE — G0151 HHCP-SERV OF PT,EA 15 MIN: HCPCS

## 2023-11-06 PROCEDURE — G0152 HHCP-SERV OF OT,EA 15 MIN: HCPCS

## 2023-11-06 NOTE — CASE COMMUNICATION
11.5.23  at 1800   caregiver called stating pt has PCP appt with dr Judy Ricardo on tues 11.7 and would like to reschedule PT and SN.     Please call Sonia Valdez at [phone] to re-schedule the 11.7.23  visit

## 2023-11-06 NOTE — CASE COMMUNICATION
OT evaluation completed 11/6/23. No further visits planned at this time as patient is functioning at baseline for ADL's.

## 2023-11-07 ENCOUNTER — DOCUMENTATION (OUTPATIENT)
Dept: HOME HEALTH SERVICES | Facility: HOME HEALTHCARE | Age: 83
End: 2023-11-07

## 2023-11-07 NOTE — PROGRESS NOTES
Assessment/Plan:    No problem-specific Assessment & Plan notes found for this encounter. {Assess/PlanSmartLinks:39773}      Subjective:      Patient ID: Mally Chowdhury is a 82 y.o. female. HPI    {Common ambulatory SmartLinks:07125}    Review of Systems      Objective: There were no vitals taken for this visit.          Physical Exam

## 2023-11-08 ENCOUNTER — HOME CARE VISIT (OUTPATIENT)
Dept: HOME HEALTH SERVICES | Facility: HOME HEALTHCARE | Age: 83
End: 2023-11-08
Payer: COMMERCIAL

## 2023-11-08 VITALS
DIASTOLIC BLOOD PRESSURE: 60 MMHG | HEART RATE: 60 BPM | OXYGEN SATURATION: 98 % | SYSTOLIC BLOOD PRESSURE: 112 MMHG | RESPIRATION RATE: 18 BRPM | TEMPERATURE: 97.8 F

## 2023-11-08 VITALS — DIASTOLIC BLOOD PRESSURE: 68 MMHG | HEART RATE: 65 BPM | SYSTOLIC BLOOD PRESSURE: 128 MMHG | OXYGEN SATURATION: 96 %

## 2023-11-08 PROCEDURE — G0299 HHS/HOSPICE OF RN EA 15 MIN: HCPCS

## 2023-11-08 PROCEDURE — G0151 HHCP-SERV OF PT,EA 15 MIN: HCPCS

## 2023-11-09 ENCOUNTER — HOME CARE VISIT (OUTPATIENT)
Dept: HOME HEALTH SERVICES | Facility: HOME HEALTHCARE | Age: 83
End: 2023-11-09
Payer: COMMERCIAL

## 2023-11-09 PROCEDURE — G0180 MD CERTIFICATION HHA PATIENT: HCPCS | Performed by: PHYSICIAN ASSISTANT

## 2023-11-10 ENCOUNTER — HOME CARE VISIT (OUTPATIENT)
Dept: HOME HEALTH SERVICES | Facility: HOME HEALTHCARE | Age: 83
End: 2023-11-10
Payer: COMMERCIAL

## 2023-11-13 ENCOUNTER — HOME CARE VISIT (OUTPATIENT)
Dept: HOME HEALTH SERVICES | Facility: HOME HEALTHCARE | Age: 83
End: 2023-11-13
Payer: COMMERCIAL

## 2023-11-13 PROCEDURE — G0151 HHCP-SERV OF PT,EA 15 MIN: HCPCS

## 2023-11-13 NOTE — CASE COMMUNICATION
Pt discharged from [address] [place] [address],[address] PT this visit. Pt independent with mobility without AD, but does occassionally utilize spc. Pt has been educated in hep and encouraged to complete daily.

## 2023-11-15 ENCOUNTER — APPOINTMENT (OUTPATIENT)
Dept: LAB | Facility: CLINIC | Age: 83
End: 2023-11-15
Payer: COMMERCIAL

## 2023-11-15 ENCOUNTER — HOME CARE VISIT (OUTPATIENT)
Dept: HOME HEALTH SERVICES | Facility: HOME HEALTHCARE | Age: 83
End: 2023-11-15
Payer: COMMERCIAL

## 2023-11-15 VITALS
SYSTOLIC BLOOD PRESSURE: 118 MMHG | DIASTOLIC BLOOD PRESSURE: 74 MMHG | RESPIRATION RATE: 18 BRPM | HEART RATE: 60 BPM | TEMPERATURE: 97.9 F | OXYGEN SATURATION: 98 %

## 2023-11-15 DIAGNOSIS — Z13.9 SCREENING FOR CONDITION: ICD-10-CM

## 2023-11-15 DIAGNOSIS — I10 HYPERTENSION, UNSPECIFIED TYPE: ICD-10-CM

## 2023-11-15 DIAGNOSIS — Z13.220 SCREENING CHOLESTEROL LEVEL: ICD-10-CM

## 2023-11-15 LAB
25(OH)D3 SERPL-MCNC: 51.7 NG/ML (ref 30–100)
ALBUMIN SERPL BCP-MCNC: 4.1 G/DL (ref 3.5–5)
ALP SERPL-CCNC: 88 U/L (ref 34–104)
ALT SERPL W P-5'-P-CCNC: 5 U/L (ref 7–52)
ANION GAP SERPL CALCULATED.3IONS-SCNC: 8 MMOL/L
AST SERPL W P-5'-P-CCNC: 20 U/L (ref 13–39)
BASOPHILS # BLD AUTO: 0.08 THOUSANDS/ÂΜL (ref 0–0.1)
BASOPHILS NFR BLD AUTO: 1 % (ref 0–1)
BILIRUB SERPL-MCNC: 0.8 MG/DL (ref 0.2–1)
BUN SERPL-MCNC: 11 MG/DL (ref 5–25)
CALCIUM SERPL-MCNC: 9.6 MG/DL (ref 8.4–10.2)
CHLORIDE SERPL-SCNC: 105 MMOL/L (ref 96–108)
CHOLEST SERPL-MCNC: 159 MG/DL
CO2 SERPL-SCNC: 27 MMOL/L (ref 21–32)
CREAT SERPL-MCNC: 0.95 MG/DL (ref 0.6–1.3)
EOSINOPHIL # BLD AUTO: 0.49 THOUSAND/ÂΜL (ref 0–0.61)
EOSINOPHIL NFR BLD AUTO: 8 % (ref 0–6)
ERYTHROCYTE [DISTWIDTH] IN BLOOD BY AUTOMATED COUNT: 14.2 % (ref 11.6–15.1)
GFR SERPL CREATININE-BSD FRML MDRD: 56 ML/MIN/1.73SQ M
GLUCOSE SERPL-MCNC: 76 MG/DL (ref 65–140)
HCT VFR BLD AUTO: 38.8 % (ref 34.8–46.1)
HDLC SERPL-MCNC: 39 MG/DL
HGB BLD-MCNC: 12.8 G/DL (ref 11.5–15.4)
IMM GRANULOCYTES # BLD AUTO: 0.02 THOUSAND/UL (ref 0–0.2)
IMM GRANULOCYTES NFR BLD AUTO: 0 % (ref 0–2)
LDLC SERPL CALC-MCNC: 91 MG/DL (ref 0–100)
LYMPHOCYTES # BLD AUTO: 1.61 THOUSANDS/ÂΜL (ref 0.6–4.47)
LYMPHOCYTES NFR BLD AUTO: 25 % (ref 14–44)
MCH RBC QN AUTO: 31.4 PG (ref 26.8–34.3)
MCHC RBC AUTO-ENTMCNC: 33 G/DL (ref 31.4–37.4)
MCV RBC AUTO: 95 FL (ref 82–98)
MONOCYTES # BLD AUTO: 0.67 THOUSAND/ÂΜL (ref 0.17–1.22)
MONOCYTES NFR BLD AUTO: 11 % (ref 4–12)
NEUTROPHILS # BLD AUTO: 3.48 THOUSANDS/ÂΜL (ref 1.85–7.62)
NEUTS SEG NFR BLD AUTO: 55 % (ref 43–75)
NONHDLC SERPL-MCNC: 120 MG/DL
NRBC BLD AUTO-RTO: 0 /100 WBCS
PLATELET # BLD AUTO: 283 THOUSANDS/UL (ref 149–390)
PMV BLD AUTO: 10.7 FL (ref 8.9–12.7)
POTASSIUM SERPL-SCNC: 4.4 MMOL/L (ref 3.5–5.3)
PROT SERPL-MCNC: 6.2 G/DL (ref 6.4–8.4)
RBC # BLD AUTO: 4.07 MILLION/UL (ref 3.81–5.12)
SODIUM SERPL-SCNC: 140 MMOL/L (ref 135–147)
TRIGL SERPL-MCNC: 146 MG/DL
WBC # BLD AUTO: 6.35 THOUSAND/UL (ref 4.31–10.16)

## 2023-11-15 PROCEDURE — 36415 COLL VENOUS BLD VENIPUNCTURE: CPT

## 2023-11-15 PROCEDURE — 80053 COMPREHEN METABOLIC PANEL: CPT

## 2023-11-15 PROCEDURE — 80061 LIPID PANEL: CPT

## 2023-11-15 PROCEDURE — G0299 HHS/HOSPICE OF RN EA 15 MIN: HCPCS

## 2023-11-15 PROCEDURE — 82306 VITAMIN D 25 HYDROXY: CPT

## 2023-11-15 PROCEDURE — 85025 COMPLETE CBC W/AUTO DIFF WBC: CPT

## 2023-11-22 ENCOUNTER — HOME CARE VISIT (OUTPATIENT)
Dept: HOME HEALTH SERVICES | Facility: HOME HEALTHCARE | Age: 83
End: 2023-11-22
Payer: COMMERCIAL

## 2023-11-22 VITALS
HEART RATE: 70 BPM | DIASTOLIC BLOOD PRESSURE: 64 MMHG | OXYGEN SATURATION: 98 % | RESPIRATION RATE: 16 BRPM | SYSTOLIC BLOOD PRESSURE: 128 MMHG

## 2023-11-22 PROCEDURE — G0299 HHS/HOSPICE OF RN EA 15 MIN: HCPCS

## 2024-02-12 ENCOUNTER — HOSPITAL ENCOUNTER (OUTPATIENT)
Dept: ULTRASOUND IMAGING | Facility: HOSPITAL | Age: 84
Discharge: HOME | End: 2024-02-12
Attending: FAMILY MEDICINE
Payer: COMMERCIAL

## 2024-02-12 DIAGNOSIS — R93.422 ABNORMAL FINDING ON DIAGNOSTIC IMAGING OF LEFT KIDNEY: ICD-10-CM

## 2024-02-12 PROCEDURE — 76775 US EXAM ABDO BACK WALL LIM: CPT

## 2024-08-30 ENCOUNTER — OFFICE VISIT (OUTPATIENT)
Dept: URGENT CARE | Facility: CLINIC | Age: 84
End: 2024-08-30
Payer: COMMERCIAL

## 2024-08-30 VITALS
HEART RATE: 87 BPM | RESPIRATION RATE: 20 BRPM | WEIGHT: 117 LBS | TEMPERATURE: 97.8 F | BODY MASS INDEX: 19.97 KG/M2 | HEIGHT: 64 IN | SYSTOLIC BLOOD PRESSURE: 82 MMHG | DIASTOLIC BLOOD PRESSURE: 54 MMHG | OXYGEN SATURATION: 98 %

## 2024-08-30 DIAGNOSIS — R42 DIZZINESS AND GIDDINESS: ICD-10-CM

## 2024-08-30 DIAGNOSIS — I95.9 HYPOTENSION, UNSPECIFIED HYPOTENSION TYPE: Primary | ICD-10-CM

## 2024-08-30 DIAGNOSIS — R19.7 DIARRHEA, UNSPECIFIED TYPE: ICD-10-CM

## 2024-08-30 PROCEDURE — 99213 OFFICE O/P EST LOW 20 MIN: CPT

## 2024-08-30 PROCEDURE — G0463 HOSPITAL OUTPT CLINIC VISIT: HCPCS

## 2024-08-30 RX ORDER — CELECOXIB 200 MG/1
200 CAPSULE ORAL DAILY
COMMUNITY
Start: 2024-08-13

## 2024-08-30 NOTE — PROGRESS NOTES
[facility]        NAME: Urszula Avina is a 83 y.o. female  : 1940    MRN: [id number]  DATE: 2024  TIME: 2:53 PM    Assessment and Plan   Hypotension, unspecified hypotension type [I95.9]  1. Hypotension, unspecified hypotension type  Transfer to other facility      2. Diarrhea, unspecified type  Transfer to other facility      3. Dizziness and giddiness  Transfer to other facility            Patient Instructions     GO TO THE ER OF CHOICE FOR FURTHER EVALUATION AND TREATMENT    Chief Complaint     Chief Complaint   Patient presents with    Dizziness     Since yesterday more than usual- affecting mobility as of last day     Diarrhea     For 2 days, given imodium yesterday          History of Present Illness       83-year-old female patient brought to this clinic accompanied by her daughter who states patient has been experiencing dizziness since yesterday, more than usual, and it is affecting her mobility the last 1 day.  Patient has also been experiencing diarrhea for the last 2 days.  She took 1 dose of Imodium yesterday.    Diarrhea         Review of Systems   Review of Systems   Gastrointestinal:  Positive for diarrhea.   Neurological:  Positive for dizziness and weakness (generalized). Negative for facial asymmetry, speech difficulty and numbness.         Current Medications       Current Outpatient Medications:     acetaminophen (TYLENOL) 325 mg tablet, Take 3 tablets (975 mg total) by mouth every 8 (eight) hours, Disp: , Rfl: 0    amLODIPine (NORVASC) 5 mg tablet, Take 1 tablet (5 mg total) by mouth daily, Disp: 30 tablet, Rfl: 0    aspirin (ECOTRIN LOW STRENGTH) 81 mg EC tablet, Take 81 mg by mouth daily. Indications: Pain, prevention, Disp: , Rfl:     celecoxib (CeleBREX) 200 mg capsule, Take 200 mg by mouth daily, Disp: , Rfl:     memantine (NAMENDA) 5 mg tablet, 5 mg 2 (two) times a day not taking, Disp: , Rfl:     senna-docusate sodium (SENOKOT S) 8.6-50 mg per tablet,  Take 2 tablets by mouth daily at bedtime, Disp: , Rfl: 0    tazarotene (AVAGE) 0.1 % cream, Apply 1 Application topically 2 (two) times a day as needed (psoriasis). Indications: Plaque Psoriasis, Disp: , Rfl:     triamcinolone (KENALOG) 0.1 % cream, Apply 1 Application topically 2 (two) times a day as needed for rash (eczema). apply to dry patches of eczema as needed  Indications: Itching, Psoriasis, Skin Inflammation, eczema, Disp: , Rfl:     Apoaequorin 10 MG CAPS, Take 1 capsule (10 mg total) by mouth daily (Patient not taking: Reported on 10/31/2023), Disp: , Rfl: 0    benazepril (LOTENSIN) 10 mg tablet, Take 1 tablet (10 mg total) by mouth daily (Patient not taking: Reported on 8/30/2024), Disp: , Rfl: 0    bisacodyl (DULCOLAX) 10 mg suppository, Insert 1 suppository (10 mg total) into the rectum daily as needed for constipation (Patient not taking: Reported on 10/31/2023), Disp: 12 suppository, Rfl: 0    polyethylene glycol (MIRALAX) 17 g packet, Take 17 g by mouth daily Do not start before October 27, 2023. (Patient not taking: Reported on 8/30/2024), Disp: , Rfl: 0    temazepam (RESTORIL) 30 mg capsule, Take 1 capsule (30 mg total) by mouth daily at bedtime, Disp: 30 capsule, Rfl: 0    Current Allergies     Allergies as of 08/30/2024 - Reviewed 08/30/2024   Allergen Reaction Noted    Penicillins  06/07/2018            The following portions of the patient's history were reviewed and updated as appropriate: allergies, current medications, past family history, past medical history, past social history, past surgical history and problem list.     Past Medical History:   Diagnosis Date    Closed left hip fracture (HCC) 11/14/2019    Hypertension     Postoperative hypotension 11/16/2019    Postoperative hypovolemic shock 11/19/2019       Past Surgical History:   Procedure Laterality Date    RI OPTX FEM SHFT FX W/INSJ IMED IMPLT W/WO SCREW Left 11/15/2019    Procedure: INSERTION NAIL IM FEMUR ANTEGRADE  "(TROCHANTERIC);  Surgeon: Natali Gamble MD;  Location:  MAIN OR;  Service: Orthopedics       Family History   Family history unknown: Yes         Medications have been verified.        Objective   BP (!) 82/54 (BP Location: Left arm, Patient Position: Sitting, Cuff Size: Standard) Comment: left arm cal  Pulse 87   Temp 97.8 °F (36.6 °C)   Resp 20   Ht 5' 4\" (1.626 m)   Wt 53.1 kg (117 lb)   SpO2 98%   BMI 20.08 kg/m²        Physical Exam     Physical Exam  Vitals and nursing note reviewed.   Constitutional:       Appearance: Normal appearance. She is normal weight.   HENT:      Head: Normocephalic and atraumatic.      Right Ear: External ear normal.      Left Ear: External ear normal.      Nose: Nose normal.      Mouth/Throat:      Mouth: Mucous membranes are moist.      Pharynx: Oropharynx is clear.   Eyes:      Extraocular Movements: Extraocular movements intact.      Conjunctiva/sclera: Conjunctivae normal.      Pupils: Pupils are equal, round, and reactive to light.      Comments: Glasses for vision correction   Cardiovascular:      Rate and Rhythm: Normal rate and regular rhythm.      Pulses: Normal pulses.      Heart sounds: Normal heart sounds.      Comments: hypotensive  Pulmonary:      Effort: Pulmonary effort is normal.      Breath sounds: Normal breath sounds.   Abdominal:      General: Abdomen is protuberant. Bowel sounds are normal.      Palpations: Abdomen is soft.      Tenderness: There is no abdominal tenderness.   Musculoskeletal:      Cervical back: Normal range of motion and neck supple.   Skin:     General: Skin is warm and dry.      Capillary Refill: Capillary refill takes less than 2 seconds.      Coloration: Skin is pale.   Neurological:      General: No focal deficit present.      Mental Status: She is alert and oriented to person, place, and time.      GCS: GCS eye subscore is 4. GCS verbal subscore is 5. GCS motor subscore is 6.      Cranial Nerves: No facial asymmetry.      " Sensory: Sensation is intact.      Motor: Weakness (generalized) present.      Comments: Unable to fully assess coordination and gait as patient experiences dizziness upon standing and has to sit back down   Psychiatric:         Mood and Affect: Mood normal.         Behavior: Behavior normal.         Thought Content: Thought content normal.         Judgment: Judgment normal.

## 2025-04-15 ENCOUNTER — APPOINTMENT (EMERGENCY)
Dept: CT IMAGING | Facility: HOSPITAL | Age: 85
End: 2025-04-15
Payer: COMMERCIAL

## 2025-04-15 ENCOUNTER — APPOINTMENT (EMERGENCY)
Dept: RADIOLOGY | Facility: HOSPITAL | Age: 85
End: 2025-04-15
Payer: COMMERCIAL

## 2025-04-15 ENCOUNTER — TELEPHONE (OUTPATIENT)
Dept: OTHER | Facility: OTHER | Age: 85
End: 2025-04-15

## 2025-04-15 ENCOUNTER — TELEPHONE (OUTPATIENT)
Dept: OBGYN CLINIC | Facility: CLINIC | Age: 85
End: 2025-04-15

## 2025-04-15 ENCOUNTER — HOSPITAL ENCOUNTER (EMERGENCY)
Facility: HOSPITAL | Age: 85
Discharge: HOME | End: 2025-04-15
Attending: EMERGENCY MEDICINE | Admitting: EMERGENCY MEDICINE
Payer: COMMERCIAL

## 2025-04-15 VITALS
TEMPERATURE: 97.7 F | WEIGHT: 120 LBS | OXYGEN SATURATION: 97 % | DIASTOLIC BLOOD PRESSURE: 72 MMHG | RESPIRATION RATE: 18 BRPM | SYSTOLIC BLOOD PRESSURE: 166 MMHG | BODY MASS INDEX: 20.6 KG/M2 | HEART RATE: 80 BPM

## 2025-04-15 DIAGNOSIS — S01.81XA FACIAL LACERATION, INITIAL ENCOUNTER: ICD-10-CM

## 2025-04-15 DIAGNOSIS — S62.309A METACARPAL BONE FRACTURE: Primary | ICD-10-CM

## 2025-04-15 DIAGNOSIS — S61.419A HAND LACERATION: ICD-10-CM

## 2025-04-15 LAB
ABO GROUP BLD: NORMAL
ALBUMIN SERPL BCG-MCNC: 3.7 G/DL (ref 3.5–5)
ALP SERPL-CCNC: 68 U/L (ref 34–104)
ALT SERPL W P-5'-P-CCNC: 7 U/L (ref 7–52)
ANION GAP SERPL CALCULATED.3IONS-SCNC: 8 MMOL/L (ref 4–13)
APTT PPP: 30 SECONDS (ref 23–34)
AST SERPL W P-5'-P-CCNC: 19 U/L (ref 13–39)
BASOPHILS # BLD AUTO: 0.03 THOUSANDS/ÂΜL (ref 0–0.1)
BASOPHILS NFR BLD AUTO: 0 % (ref 0–1)
BILIRUB SERPL-MCNC: 0.67 MG/DL (ref 0.2–1)
BLD GP AB SCN SERPL QL: NEGATIVE
BUN SERPL-MCNC: 13 MG/DL (ref 5–25)
CALCIUM SERPL-MCNC: 8.8 MG/DL (ref 8.4–10.2)
CHLORIDE SERPL-SCNC: 104 MMOL/L (ref 96–108)
CO2 SERPL-SCNC: 25 MMOL/L (ref 21–32)
CREAT SERPL-MCNC: 0.83 MG/DL (ref 0.6–1.3)
EOSINOPHIL # BLD AUTO: 0.15 THOUSAND/ÂΜL (ref 0–0.61)
EOSINOPHIL NFR BLD AUTO: 2 % (ref 0–6)
ERYTHROCYTE [DISTWIDTH] IN BLOOD BY AUTOMATED COUNT: 15.4 % (ref 11.6–15.1)
GFR SERPL CREATININE-BSD FRML MDRD: 65 ML/MIN/1.73SQ M
GLUCOSE SERPL-MCNC: 154 MG/DL (ref 65–140)
HCT VFR BLD AUTO: 32.3 % (ref 34.8–46.1)
HGB BLD-MCNC: 10 G/DL (ref 11.5–15.4)
IMM GRANULOCYTES # BLD AUTO: 0.03 THOUSAND/UL (ref 0–0.2)
IMM GRANULOCYTES NFR BLD AUTO: 0 % (ref 0–2)
INR PPP: 1.22 (ref 0.85–1.19)
LYMPHOCYTES # BLD AUTO: 0.69 THOUSANDS/ÂΜL (ref 0.6–4.47)
LYMPHOCYTES NFR BLD AUTO: 9 % (ref 14–44)
MAGNESIUM SERPL-MCNC: 1.8 MG/DL (ref 1.9–2.7)
MCH RBC QN AUTO: 26.5 PG (ref 26.8–34.3)
MCHC RBC AUTO-ENTMCNC: 31 G/DL (ref 31.4–37.4)
MCV RBC AUTO: 86 FL (ref 82–98)
MONOCYTES # BLD AUTO: 0.73 THOUSAND/ÂΜL (ref 0.17–1.22)
MONOCYTES NFR BLD AUTO: 9 % (ref 4–12)
NEUTROPHILS # BLD AUTO: 6.28 THOUSANDS/ÂΜL (ref 1.85–7.62)
NEUTS SEG NFR BLD AUTO: 80 % (ref 43–75)
NRBC BLD AUTO-RTO: 0 /100 WBCS
PLATELET # BLD AUTO: 261 THOUSANDS/UL (ref 149–390)
PMV BLD AUTO: 10.3 FL (ref 8.9–12.7)
POTASSIUM SERPL-SCNC: 3.2 MMOL/L (ref 3.5–5.3)
PROT SERPL-MCNC: 6.2 G/DL (ref 6.4–8.4)
PROTHROMBIN TIME: 15.9 SECONDS (ref 12.3–15)
RBC # BLD AUTO: 3.77 MILLION/UL (ref 3.81–5.12)
RH BLD: POSITIVE
SODIUM SERPL-SCNC: 137 MMOL/L (ref 135–147)
SPECIMEN EXPIRATION DATE: NORMAL
WBC # BLD AUTO: 7.91 THOUSAND/UL (ref 4.31–10.16)

## 2025-04-15 PROCEDURE — 71045 X-RAY EXAM CHEST 1 VIEW: CPT

## 2025-04-15 PROCEDURE — 72125 CT NECK SPINE W/O DYE: CPT

## 2025-04-15 PROCEDURE — 73564 X-RAY EXAM KNEE 4 OR MORE: CPT

## 2025-04-15 PROCEDURE — 96365 THER/PROPH/DIAG IV INF INIT: CPT

## 2025-04-15 PROCEDURE — 86901 BLOOD TYPING SEROLOGIC RH(D): CPT | Performed by: EMERGENCY MEDICINE

## 2025-04-15 PROCEDURE — 86900 BLOOD TYPING SEROLOGIC ABO: CPT | Performed by: EMERGENCY MEDICINE

## 2025-04-15 PROCEDURE — 72170 X-RAY EXAM OF PELVIS: CPT

## 2025-04-15 PROCEDURE — 86850 RBC ANTIBODY SCREEN: CPT | Performed by: EMERGENCY MEDICINE

## 2025-04-15 PROCEDURE — 80053 COMPREHEN METABOLIC PANEL: CPT | Performed by: EMERGENCY MEDICINE

## 2025-04-15 PROCEDURE — 36415 COLL VENOUS BLD VENIPUNCTURE: CPT | Performed by: EMERGENCY MEDICINE

## 2025-04-15 PROCEDURE — 71260 CT THORAX DX C+: CPT

## 2025-04-15 PROCEDURE — 85610 PROTHROMBIN TIME: CPT | Performed by: EMERGENCY MEDICINE

## 2025-04-15 PROCEDURE — 99284 EMERGENCY DEPT VISIT MOD MDM: CPT

## 2025-04-15 PROCEDURE — 73130 X-RAY EXAM OF HAND: CPT

## 2025-04-15 PROCEDURE — 85025 COMPLETE CBC W/AUTO DIFF WBC: CPT | Performed by: EMERGENCY MEDICINE

## 2025-04-15 PROCEDURE — 83735 ASSAY OF MAGNESIUM: CPT | Performed by: EMERGENCY MEDICINE

## 2025-04-15 PROCEDURE — 70450 CT HEAD/BRAIN W/O DYE: CPT

## 2025-04-15 PROCEDURE — 85730 THROMBOPLASTIN TIME PARTIAL: CPT | Performed by: EMERGENCY MEDICINE

## 2025-04-15 PROCEDURE — 74177 CT ABD & PELVIS W/CONTRAST: CPT

## 2025-04-15 RX ORDER — CEFUROXIME AXETIL 500 MG/1
500 TABLET ORAL EVERY 12 HOURS SCHEDULED
Qty: 14 TABLET | Refills: 0 | Status: SHIPPED | OUTPATIENT
Start: 2025-04-15 | End: 2025-04-22

## 2025-04-15 RX ORDER — CEFAZOLIN SODIUM 2 G/50ML
2000 SOLUTION INTRAVENOUS ONCE
Status: COMPLETED | OUTPATIENT
Start: 2025-04-15 | End: 2025-04-15

## 2025-04-15 RX ORDER — ACETAMINOPHEN 325 MG/1
650 TABLET ORAL ONCE
Status: COMPLETED | OUTPATIENT
Start: 2025-04-15 | End: 2025-04-15

## 2025-04-15 RX ORDER — BACITRACIN, NEOMYCIN, POLYMYXIN B 400; 3.5; 5 [USP'U]/G; MG/G; [USP'U]/G
1 OINTMENT TOPICAL ONCE
Status: COMPLETED | OUTPATIENT
Start: 2025-04-15 | End: 2025-04-15

## 2025-04-15 RX ADMIN — Medication 1 APPLICATION: at 14:51

## 2025-04-15 RX ADMIN — BACITRACIN ZINC, NEOMYCIN, POLYMYXIN B SULFAT 1 SMALL APPLICATION: 5000; 3.5; 4 OINTMENT TOPICAL at 15:50

## 2025-04-15 RX ADMIN — ACETAMINOPHEN 650 MG: 325 TABLET ORAL at 14:26

## 2025-04-15 RX ADMIN — Medication 1 APPLICATION: at 14:26

## 2025-04-15 RX ADMIN — IOHEXOL 100 ML: 350 INJECTION, SOLUTION INTRAVENOUS at 12:52

## 2025-04-15 RX ADMIN — CEFAZOLIN SODIUM 2000 MG: 2 SOLUTION INTRAVENOUS at 13:27

## 2025-04-15 NOTE — ED PROVIDER NOTES
Emergency Department Trauma Note  Urszula Avina 84 y.o. female MRN: [id number]  Unit/Bed#: ED 28/ED 28 Encounter: [id number]      Trauma Alert: Trauma Acuity: Trauma Evaluation  Model of Arrival:   via    Trauma Team: Current Providers  Attending Provider: Arnie Barker DO  Registered Nurse: Danae Arceo RN  Consultants:     None      History of Present Illness     Chief Complaint:   Chief Complaint   Patient presents with    Fall     Tripped and fell on sidewalk while walking in to [place]. +head strike, no LOC, takes aspirin. GCS 14 at baseline; hx alzheimer's. Lac to head, lac to left hand. C-collar in place.      HPI:  Urszula Avina is a 84 y.o. female who presents with intac.  Mechanism:Details of Incident: fall; tripped on side walk Injury Date: 04/15/25 Injury Time: 1145      84-year-old female history of dementia, on aspirin presents after a fall.  Patient tripped and fell landing on her knees, left hand and face.  No LOC.  EMS reported no anticoagulation however patient is a baby aspirin.        Review of Systems    Historical Information     Immunizations: There is no immunization history for the selected administration types on file for this patient.    Past Medical History:   Diagnosis Date    Closed left hip fracture (HCC) 11/14/2019    Hypertension     Postoperative hypotension 11/16/2019    Postoperative hypovolemic shock 11/19/2019       Family History   Family history unknown: Yes     Past Surgical History:   Procedure Laterality Date    NV OPTX FEM SHFT FX W/INSJ IMED IMPLT W/WO SCREW Left 11/15/2019    Procedure: INSERTION NAIL IM FEMUR ANTEGRADE (TROCHANTERIC);  Surgeon: Natali Gamble MD;  Location:  MAIN OR;  Service: Orthopedics     Social History     Tobacco Use    Smoking status: Never    Smokeless tobacco: Never   Substance Use Topics    Alcohol use: Not Currently    Drug use: Never     E-Cigarette/Vaping     E-Cigarette/Vaping Substances       Family History:   Family History    Family history unknown: Yes       Meds/Allergies   Prior to Admission Medications   Prescriptions Last Dose Informant Patient Reported? Taking?   Apoaequorin 10 MG CAPS   No No   Sig: Take 1 capsule (10 mg total) by mouth daily   Patient not taking: Reported on 10/31/2023   acetaminophen (TYLENOL) 325 mg tablet   No No   Sig: Take 3 tablets (975 mg total) by mouth every 8 (eight) hours   amLODIPine (NORVASC) 5 mg tablet   No No   Sig: Take 1 tablet (5 mg total) by mouth daily   aspirin (ECOTRIN LOW STRENGTH) 81 mg EC tablet   Yes No   Sig: Take 81 mg by mouth daily. Indications: Pain, prevention   benazepril (LOTENSIN) 10 mg tablet   No No   Sig: Take 1 tablet (10 mg total) by mouth daily   Patient not taking: Reported on 2024   bisacodyl (DULCOLAX) 10 mg suppository   No No   Sig: Insert 1 suppository (10 mg total) into the rectum daily as needed for constipation   Patient not taking: Reported on 10/31/2023   celecoxib (CeleBREX) 200 mg capsule   Yes No   Sig: Take 200 mg by mouth daily   memantine (NAMENDA) 5 mg tablet   Yes No   Si mg 2 (two) times a day not taking   polyethylene glycol (MIRALAX) 17 g packet   No No   Sig: Take 17 g by mouth daily Do not start before 2023.   Patient not taking: Reported on 2024   senna-docusate sodium (SENOKOT S) 8.6-50 mg per tablet   No No   Sig: Take 2 tablets by mouth daily at bedtime   tazarotene (AVAGE) 0.1 % cream   Yes No   Sig: Apply 1 Application topically 2 (two) times a day as needed (psoriasis). Indications: Plaque Psoriasis   temazepam (RESTORIL) 30 mg capsule   No No   Sig: Take 1 capsule (30 mg total) by mouth daily at bedtime   triamcinolone (KENALOG) 0.1 % cream   Yes No   Sig: Apply 1 Application topically 2 (two) times a day as needed for rash (eczema). apply to dry patches of eczema as needed  Indications: Itching, Psoriasis, Skin Inflammation, eczema      Facility-Administered Medications: None       Allergies   Allergen Reactions     Penicillins        PHYSICAL EXAM    PE limited by: none    Objective   Vitals:   First set: Temperature: 98.1 °F (36.7 °C) (04/15/25 1210)  Pulse: 94 (04/15/25 1210)  Respirations: 18 (04/15/25 1210)  Blood Pressure: (!) 182/78 (04/15/25 1210)  SpO2: 97 % (04/15/25 1210)    Primary Survey:   (A) Airway: intact  (B) Breathing: intact  (C) Circulation: Pulses:   normal  (D) Disabliity:  GCS Total:  15  (E) Expose:  Completed    Secondary Survey: (Click on Physical Exam tab above)  Physical Exam  Vitals and nursing note reviewed.   Constitutional:       Appearance: She is normal weight.   HENT:      Head: Normocephalic.      Comments: 1cm laceration left forehead  Eyes:      Conjunctiva/sclera: Conjunctivae normal.      Pupils: Pupils are equal, round, and reactive to light.   Cardiovascular:      Rate and Rhythm: Normal rate and regular rhythm.   Pulmonary:      Effort: Pulmonary effort is normal. No respiratory distress.   Abdominal:      General: Abdomen is flat. There is no distension.   Musculoskeletal:         General: No swelling or deformity.      Cervical back: Normal range of motion and neck supple.      Comments: Tenderness to the left hip, bilateral knees, left hand     Skin:     General: Skin is dry.      Coloration: Skin is not jaundiced.      Comments: Still deep skin tear avulsion on the dorsum of the left hand with exposed intact tendon   Neurological:      General: No focal deficit present.      Mental Status: She is alert. Mental status is at baseline.   Psychiatric:         Mood and Affect: Mood normal.         Thought Content: Thought content normal.         Cervical spine cleared by clinical criteria? No (imaging required)      Invasive Devices       None                   Lab Results:   Results Reviewed       Procedure Component Value Units Date/Time    Protime-INR [[id number]]  (Abnormal) Collected: 04/15/25 1326    Lab Status: Final result Specimen: Blood from Arm, Right Updated: 04/15/25  1346     Protime 15.9 seconds      INR 1.22    Narrative:      INR Therapeutic Range    Indication                                             INR Range      Atrial Fibrillation                                               2.0-3.0  Hypercoagulable State                                    2.0.2.3  Left Ventricular Asist Device                            2.0-3.0  Mechanical Heart Valve                                  -    Aortic(with afib, MI, embolism, HF, LA enlargement,    and/or coagulopathy)                                     2.0-3.0 (2.5-3.5)     Mitral                                                             2.5-3.5  Prosthetic/Bioprosthetic Heart Valve               2.0-3.0  Venous thromboembolism (VTE: VT, PE        2.0-3.0    APTT [[id number]]  (Normal) Collected: 04/15/25 1326    Lab Status: Final result Specimen: Blood from Arm, Right Updated: 04/15/25 1346     PTT 30 seconds     Comprehensive metabolic panel [[id number]]  (Abnormal) Collected: 04/15/25 1326    Lab Status: Final result Specimen: Blood from Arm, Right Updated: 04/15/25 1346     Sodium 137 mmol/L      Potassium 3.2 mmol/L      Chloride 104 mmol/L      CO2 25 mmol/L      ANION GAP 8 mmol/L      BUN 13 mg/dL      Creatinine 0.83 mg/dL      Glucose 154 mg/dL      Calcium 8.8 mg/dL      AST 19 U/L      ALT 7 U/L      Alkaline Phosphatase 68 U/L      Total Protein 6.2 g/dL      Albumin 3.7 g/dL      Total Bilirubin 0.67 mg/dL      eGFR 65 ml/min/1.73sq m     Narrative:      National Kid[employer] Foundation guidelines for Chronic Kidney Disease (CKD):     Stage 1 with normal or high GFR (GFR > 90 mL/min/1.73 square meters)    Stage 2 Mild CKD (GFR = 60-89 mL/min/1.73 square meters)    Stage 3A Moderate CKD (GFR = 45-59 mL/min/1.73 square meters)    Stage 3B Moderate CKD (GFR = 30-44 mL/min/1.73 square meters)    Stage 4 Severe CKD (GFR = 15-29 mL/min/1.73 square meters)    Stage 5 End Stage CKD (GFR <15 mL/min/1.73 square meters)  Note: GFR  calculation is accurate only with a steady state creatinine    Magnesium [[id number]]  (Abnormal) Collected: 04/15/25 1326    Lab Status: Final result Specimen: Blood from Arm, Right Updated: 04/15/25 1346     Magnesium 1.8 mg/dL     CBC and differential [[id number]]  (Abnormal) Collected: 04/15/25 1326    Lab Status: Final result Specimen: Blood from Arm, Right Updated: 04/15/25 1331     WBC 7.91 Thousand/uL      RBC 3.77 Million/uL      Hemoglobin 10.0 g/dL      Hematocrit 32.3 %      MCV 86 fL      MCH 26.5 pg      MCHC 31.0 g/dL      RDW 15.4 %      MPV 10.3 fL      Platelets 261 Thousands/uL      nRBC 0 /100 WBCs      Segmented % 80 %      Immature Grans % 0 %      Lymphocytes % 9 %      Monocytes % 9 %      Eosinophils Relative 2 %      Basophils Relative 0 %      Absolute Neutrophils 6.28 Thousands/µL      Absolute Immature Grans 0.03 Thousand/uL      Absolute Lymphocytes 0.69 Thousands/µL      Absolute Monocytes 0.73 Thousand/µL      Eosinophils Absolute 0.15 Thousand/µL      Basophils Absolute 0.03 Thousands/µL                    Imaging Studies:   Direct to CT: No  XR hand 3+ views LEFT   Final Result by Wojciech Foster MD (04/15 1706)   Left second metacarpal head and neck fracture.         Computerized Assisted Algorithm (CAA) may have been used to analyze all applicable images.         Workstation performed: [id number]         XR knee 4+ vw left injury   Final Result by Wojciech Foster MD (04/15 1706)      No acute osseous abnormality.      Degenerative changes as described.         Computerized Assisted Algorithm (CAA) may have been used to analyze all applicable images.         Workstation performed: [id number]         XR knee 4+ vw right injury   Final Result by Kervin Dey MD (04/15 1700)      Knee joint effusion without an acute osseous abnormality.      Degenerative changes as described.         Computerized Assisted Algorithm (CAA) may have been used to analyze all applicable images.        "  Workstation performed: [id number]         TRAUMA - CT chest abdomen pelvis w contrast   Final Result by Janine Conde MD (04/15 1348)      No findings of acute traumatic injury in the chest, abdomen or pelvis.      Asymmetric atrophy of the left kidney is unchanged. Increasing size of hypodense lesion in the left kidney measuring above simple fluid attenuation, may reflect a proteinaceous/hemorrhagic cyst although indeterminate. Correlate with nonemergent MR.      The study was marked in EPIC for immediate notification.      Workstation performed: [id number]         TRAUMA - CT spine cervical wo contrast   Final Result by Kervin Dey MD (04/15 1311)      1.  No cervical spine fracture or traumatic malalignment.   2.  Mild to moderate spondylosis                  Workstation performed: [id number]         TRAUMA - CT head wo contrast   Final Result by Kervin Dey MD (04/15 1305)      1.  No intracranial hemorrhage or calvarial fracture.   2.  Mild, chronic microangiopathy                  Workstation performed: [id number]         XR Trauma chest portable   Final Result by Lorna Hinojosa MD (04/15 1241)      No acute cardiopulmonary disease.      No acute displaced fracture.      A chest CT is pending.            Workstation performed: [id number]         XR Trauma pelvis ap only 1 or 2 vw   Final Result by Stevo Bañuelos MD (04/15 1319)      No acute osseous abnormality.         Computerized Assisted Algorithm (CAA) may have been used to analyze all applicable images.         Workstation performed: [id number]               Procedures  Universal Protocol:  Consent: Verbal consent obtained.  Risks and benefits: risks, benefits and alternatives were discussed  Consent given by: guardian  Time out: Immediately prior to procedure a \"time out\" was called to verify the correct patient, procedure, equipment, support staff and site/side marked as required.  Timeout called at: 4/15/2025 9:34 " "PM.  Required items: required blood products, implants, devices, and special equipment available  Patient identity confirmed: verbally with patient  Laceration repair    Date/Time: 4/15/2025 9:34 PM    Performed by: Arnie Barker DO  Authorized by: Arnie Barker DO  Body area: upper extremity  Location details: left hand  Laceration length: 9 cm  Contamination: The wound is contaminated.  Foreign bodies: unknown    Anesthesia:  Local Anesthetic: LET (lido, epi, tetracaine)    Wound Dehiscence:  Superficial Wound Dehiscence: simple closure      Procedure Details:  Preparation: Patient was prepped and draped in the usual sterile fashion.  Irrigation solution: saline  Irrigation method: jet lavage  Amount of cleaning: extensive  Debridement: none  Degree of undermining: none  Skin closure: 5-0 nylon  Number of sutures: 3  Technique: horizontal mattress  Approximation: loose  Patient tolerance: patient tolerated the procedure well with no immediate complications  Foreign body: gravel.    Universal Protocol:  Risks and benefits: risks, benefits and alternatives were discussed  Consent given by: guardian  Time out: Immediately prior to procedure a \"time out\" was called to verify the correct patient, procedure, equipment, support staff and site/side marked as required.  Required items: required blood products, implants, devices, and special equipment available  Laceration repair    Date/Time: 4/15/2025 9:35 PM    Performed by: Arnie Barker DO  Authorized by: Arnie Barker DO  Body area: head/neck  Location details: forehead  Laceration length: 1 cm    Anesthesia:  Local Anesthetic: LET (lido, epi, tetracaine)    Wound Dehiscence:  Superficial Wound Dehiscence: simple closure      Procedure Details:  Preparation: Patient was prepped and draped in the usual sterile fashion.  Irrigation solution: saline  Skin closure: 5-0 nylon  Number of sutures: 1  Approximation: close  Approximation difficulty: " simple  Dressing: antibiotic ointment  Patient tolerance: patient tolerated the procedure well with no immediate complications               ED Course  ED Course as of 04/15/25 2150   Tue Apr 15, 2025   2136 Discussed case with Dr. Barrientos on-call for hand surgery, due to metacarpal fracture, patient already received Ancef.  Will start patient on the oral antibiotics.  Recommends loose closure, splint, and follow-up as an outpatient tomorrow.  Family is comfortable with that plan.           Medical Decision Making  Patient with apparent mechanical fall on aspirin.  Trauma evaluation called on arrival.    She has a small laceration to the left forehead. C-collar in place, patient altered at her baseline though.    Trauma evaluation called on arrival.  Patient altered, will get CT head to rule out intracranial hemorrhage, CT C-spine to rule out fracture, chest abdomen pelvis given the bilateral hip tenderness along with pain in the chest.  She did have significant laceration to the left hand with exposed intact tendon, we will start on IV Ancef.  Patient refused tetanus vaccination.    X-rays of the knees reassuring.  X-ray of the left hand does show a second metacarpal carpal fracture, given the significant laceration on the dorsum of the hand I discussed with hand surgery who will see the patient tomorrow.  Wound irrigated copiously with foreign matter removed.  Was repaired loosely with horizontal mattress to avoid tearing her very thin/frail skin.    Patient was able to ambulate at baseline, was comfortable with discharge home with her daughter.    Problems Addressed:  Facial laceration, initial encounter: acute illness or injury    Amount and/or Complexity of Data Reviewed  Labs: ordered. Decision-making details documented in ED Course.  Radiology: ordered and independent interpretation performed. Decision-making details documented in ED Course.    Risk  OTC drugs.  Prescription drug  management.                Disposition  [facility] Transfer: No  Final diagnoses:   Metacarpal bone fracture   Hand laceration   Facial laceration, initial encounter     Time reflects when diagnosis was documented in both MDM as applicable and the Disposition within this note       Time User Action Codes Description Comment    4/15/2025  3:25 PM Arnie Barker [S62.309A] Metacarpal bone fracture     4/15/2025  3:26 PM Arnie Barker [S61.419A] Hand laceration     4/15/2025  3:26 PM Arnie Barker [S01.81XA] Facial laceration, initial encounter           ED Disposition       ED Disposition   Discharge    Condition   Stable    Date/Time   Tue Apr 15, 2025  3:25 PM    Comment   Urszula Avina discharge to home/self care.                   Follow-up Information       Follow up With Specialties Details Why Contact Info    Mango Barrientos MD Orthopedic Surgery, Hand Surgery Schedule an appointment as soon as possible for a visit   [address] [place] [address]  [phone]            Discharge Medication List as of 4/15/2025  4:12 PM        START taking these medications    Details   cefuroxime (CEFTIN) 500 mg tablet Take 1 tablet (500 mg total) by mouth every 12 (twelve) hours for 7 days, Starting Tue 4/15/2025, Until Tue 4/22/2025, Normal           CONTINUE these medications which have NOT CHANGED    Details   acetaminophen (TYLENOL) 325 mg tablet Take 3 tablets (975 mg total) by mouth every 8 (eight) hours, Starting Thu 10/26/2023, No Print      amLODIPine (NORVASC) 5 mg tablet Take 1 tablet (5 mg total) by mouth daily, Starting Mon 11/8/2021, Normal      Apoaequorin 10 MG CAPS Take 1 capsule (10 mg total) by mouth daily, Starting Wed 11/20/2019, No Print      aspirin (ECOTRIN LOW STRENGTH) 81 mg EC tablet Take 81 mg by mouth daily. Indications: Pain, prevention, Historical Med      benazepril (LOTENSIN) 10 mg tablet Take 1 tablet (10 mg total) by mouth daily, Starting Wed  11/20/2019, No Print      bisacodyl (DULCOLAX) 10 mg suppository Insert 1 suppository (10 mg total) into the rectum daily as needed for constipation, Starting Wed 11/20/2019, No Print      celecoxib (CeleBREX) 200 mg capsule Take 200 mg by mouth daily, Starting Tue 8/13/2024, Historical Med      memantine (NAMENDA) 5 mg tablet 5 mg 2 (two) times a day not taking, Starting Tue 1/21/2020, Historical Med      polyethylene glycol (MIRALAX) 17 g packet Take 17 g by mouth daily Do not start before October 27, 2023., Starting Fri 10/27/2023, No Print      senna-docusate sodium (SENOKOT S) 8.6-50 mg per tablet Take 2 tablets by mouth daily at bedtime, Starting Wed 11/20/2019, No Print      tazarotene (AVAGE) 0.1 % cream Apply 1 Application topically 2 (two) times a day as needed (psoriasis). Indications: Plaque Psoriasis, Starting Wed 7/26/2023, Historical Med      temazepam (RESTORIL) 30 mg capsule Take 1 capsule (30 mg total) by mouth daily at bedtime, Starting Wed 11/20/2019, Until Fri 12/27/2019, No Print      triamcinolone (KENALOG) 0.1 % cream Apply 1 Application topically 2 (two) times a day as needed for rash (eczema). apply to dry patches of eczema as needed  Indications: Itching, Psoriasis, Skin Inflammation, eczema, Historical Med               PDMP Review         Value Time User    PDMP Reviewed  Yes 11/19/2019  3:14 PM Grisel Gallego PA-C            ED Provider  Electronically Signed by           Arnie Barker DO  04/15/25 1291

## 2025-04-15 NOTE — DISCHARGE INSTRUCTIONS
You have a broken bone in your left hand, as well as a deep laceration.  We started you on antibiotics.  We spoke to the hand surgeon who would like to see you in the office tomorrow, call the office at the number to schedule a follow-up, tell office that the hand surgeon, Dr. Barrientos is aware of you and would like to see you on 4/16    The sutures on your face should be removed in 5 to 7 days.  Apply Neosporin to this 2-4 times daily      No findings of acute traumatic injury in the chest, abdomen or pelvis., Asymmetric atrophy of the left kidney is unchanged. Increasing size of hypodense lesion in the left kidney measuring above simple fluid attenuation, may reflect a proteinaceous/hemorrhagic cyst although indeterminate. Correlate with nonemergent MR.

## 2025-04-15 NOTE — TELEPHONE ENCOUNTER
"Daughter stated, \"Mom had a fall and her finger is broken. She does have a touch of dementia and our commute in an hour. We left the hospital today and they want her to follow up with orthopedics. I want her seen by Mango Barrientos MD at either location in [place]. Is there availability tomorrow, 4/16/25?\"    Please contact Daphne when office opens to schedule an appointment. She's okay to be go to either location in [place].  "

## 2025-04-15 NOTE — TELEPHONE ENCOUNTER
----- Message from Mango Barrientos MD sent at 4/15/2025  2:04 PM EDT -----  Regarding: Patient apointment  Please schedule appointment with me tomorrow (4/16/2025).  Thank you.

## 2025-04-15 NOTE — INCIDENTAL FINDINGS
The following findings require follow up:  Radiographic finding   Finding: Renal Cyst   Follow up required: Outpatient MRI   Follow up should be done within 1 month(s)    Please notify the following clinician to assist with the follow up:   Dr. Castro    Incidental finding results were discussed with the Patient by Arnie Barker DO on 04/15/25.   They expressed understanding and all questions answered.

## 2025-04-15 NOTE — TELEPHONE ENCOUNTER
Left message to schedule tomorrow with dr rubi.  Also called daughter but unable to leave a message  please secure chat or teams me when they call back as thei is an urgent apt

## 2025-04-16 ENCOUNTER — TELEPHONE (OUTPATIENT)
Dept: OBGYN CLINIC | Facility: HOSPITAL | Age: 85
End: 2025-04-16

## 2025-04-16 ENCOUNTER — OFFICE VISIT (OUTPATIENT)
Dept: OBGYN CLINIC | Facility: HOSPITAL | Age: 85
End: 2025-04-16
Payer: COMMERCIAL

## 2025-04-16 ENCOUNTER — OFFICE VISIT (OUTPATIENT)
Dept: OCCUPATIONAL THERAPY | Facility: HOSPITAL | Age: 85
End: 2025-04-16
Payer: COMMERCIAL

## 2025-04-16 VITALS — BODY MASS INDEX: 20.49 KG/M2 | HEIGHT: 64 IN | WEIGHT: 120 LBS

## 2025-04-16 DIAGNOSIS — S61.419A HAND LACERATION: ICD-10-CM

## 2025-04-16 DIAGNOSIS — S62.391A: ICD-10-CM

## 2025-04-16 DIAGNOSIS — S62.301A CLOSED FRACTURE OF SECOND METACARPAL BONE OF LEFT HAND, UNSPECIFIED FRACTURE MORPHOLOGY, INITIAL ENCOUNTER: Primary | ICD-10-CM

## 2025-04-16 PROCEDURE — L3906 WHO W/O JOINTS CF: HCPCS | Performed by: OCCUPATIONAL THERAPIST

## 2025-04-16 PROCEDURE — 26600 TREAT METACARPAL FRACTURE: CPT | Performed by: ORTHOPAEDIC SURGERY

## 2025-04-16 PROCEDURE — 99214 OFFICE O/P EST MOD 30 MIN: CPT | Performed by: ORTHOPAEDIC SURGERY

## 2025-04-16 NOTE — PROGRESS NOTES
ORTHOPAEDIC HAND, WRIST, AND ELBOW OFFICE  VISIT     Name: Urszula Avina      : 1940      MRN: [id number]  Encounter Provider: Mango Barrientos MD  Encounter Date: 2025   Encounter department: [facility] [place]  :  Assessment & Plan  Other closed fracture of second metacarpal bone of left hand, initial encounter  X-rays shows a stable left index MC neck fracture  Patient placed in a forearm based intrinsic plus splint by hand therapy today.  May remove only for hygiene, pat dry, re-bandage as instructed.  Follow up 2 weeks with x-rays and suture removal  Orders:  •  Ambulatory Referral to Orthopedic Surgery  •  Ambulatory Referral to PT/OT Hand Therapy; Future  •  Fracture / Dislocation Treatment    Hand laceration    Orders:  •  Ambulatory Referral to Orthopedic Surgery        General Discussions:  Fracture - Nonoperative Care: The physiology of a fractured bone was discussed with the patient today.  With nondisplaced or minimally displaced fractures, conservative treatment often results in a functional recovery.  Typically, these fractures can be immobilized in either a cast or splint depending on the pattern.  Radiographs are typically taken at intervals throughout the fracture healing to ensure that muscular forces do not cause loss of reduction or alignment.  If the fracture loses its alignment, surgical intervention may be required to stabilize it.  Medical conditions such as diabetes, osteoporosis, vitamin D deficiency, and a history of or exposure to smoking may delay or prevent fracture healing.      History of Present Illness   HPI  Chief Complaint   Patient presents with   • Left Hand - New Patient Visit     Second metacarpal head and neck slightly impacted fracture       Urszula Avina is a 84 y.o. female who presents today for initial evaluation of left index metacarpal neck fracture.  [place]      REVIEW OF SYSTEMS:  General: no fever, no  "chills  HEENT:  No loss of hearing or eyesight problems  Eyes:  No red eyes  Respiratory:  No coughing, shortness of breath or wheezing  Cardiovascular:  No chest pain, no palpitations  GI:  Abdomen soft nontender, denies nausea  Endocrine:  No muscle weakness, no frequent urination, no excessive thirst  Urinary:  No dysuria, no incontinence  Musculoskeletal: see HPI and PE  SKIN:  No skin rash, no dry skin  Neurological:  No headaches, no confusion  Psychiatric:  No suicide thoughts, no anxiety, no depression  Review of all other systems is negative    Objective   Ht 5' 4\" (1.626 m)   Wt 54.4 kg (120 lb)   BMI 20.60 kg/m²      General: well developed and well nourished, alert, oriented times 3, and appears comfortable  Psychiatric: Normal  HEENT: Trachea Midline, No torticollis  Cardiovascular: No discernable arrhythmia  Pulmonary: No wheezing or stridor  Abdomen: No rebound or guarding  Extremities: No peripheral edema  Skin: No masses, erythema, lacerations, fluctation, ulcerations  Neurovascular: Sensation Intact to the Median, Ulnar, Radial Nerve, Motor Intact to the Median, Ulnar, Radial Nerve, and Pulses Intact    Musculoskeletal exam:  Left hand:  Diffuse swelling and ecchymosis.  Sutures intact  Flexors and extensors intact  strength not tested secondary to fracture.   Large flap of dorsal skin alive and viable covering defect.  + eccymosis, no sighs of infection.    Decreased ROM secondary to pain    STUDIES REVIEWED:  Images were reviewed in PACS and demonstrate:   Left hand x-rays demonstrates stable index metacarpal neck fracture       PROCEDURES PERFORMED:  Fracture / Dislocation Treatment    Date/Time: 4/16/2025 1:45 PM    Performed by: Mango Barrientos MD  Authorized by: Mango Barrientos MD    Patient Location:  Clinic  [place] Protocol:  Consent: Verbal consent obtained.  Risks and benefits: risks, benefits and alternatives were discussed  Consent given by: patient  Radiology Images " displayed and confirmed. If images not available, report reviewed: imaging studies available  Patient identity confirmed: verbally with patient    Injury location:  Hand  Location details:  Left hand  Injury type:  Fracture  Fracture type: second metacarpal    Neurovascular status: Neurovascularly intact    Manipulation performed?: No    Immobilization:  Splint  Neurovascular status: Neurovascularly intact    Distal perfusion: normal    Neurological function: normal    Range of motion: normal    Patient tolerance:  Patient tolerated the procedure well with no immediate complications    Scribe Attestation    I,:  Desi Cardenas MA am acting as a scribe while in the presence of the attending physician.:       I,:  Mango Barrientos MD personally performed the services described in this documentation    as scribed in my presence.:

## 2025-04-16 NOTE — PROGRESS NOTES
Orthosis    Diagnosis:   1. Closed fracture of second metacarpal bone of left hand, unspecified fracture morphology, initial encounter          Indication: Fracture    Location: Left  wrist, hand, index finger, and long finger  Supplies: Custom Fit Orthotic  Orthosis type: Radial Gutter  Wearing Schedule: Remove for hygiene only  Describe Position: WE 10 degrees MP of IF LF 50 degrees flexion, Ips free (Radial gutter)    Precautions: Fracture    Patient or Caregiver expresses understanding of wearing Schedule and Precautions? Yes  Patient or Caregiver able to don/doff orthotic independently?Yes    Written orders provided to patient? Yes  Orders Obtained: Written  Orders Obtained from: [facility]    Return for evaluation and treatment Yes

## 2025-04-21 NOTE — PROGRESS NOTES
CC: Obstetric visit    HPI: 24-year-old  2 para 1 presents at 23-4/7 weeks for her obstetric visit.  Her baby is moving actively.  She is in treatment with neurology for her headaches and has started verapamil.  This has brought about significant improvement.    Review of systems    This is positive for headaches, now improved.  Negative for abdominal or pelvic pain.  Negative for vaginal bleeding.    Physical examination    The abdomen is soft and gravid.  There is no epigastric tenderness.  No fundal tenderness.  No CVA tenderness.  No suprapubic tenderness.  Extremities are equal in size    Assessment and plan    1.  Intrauterine pregnancy at 23-4/7 weeks  2.  Ultrasound was performed today.  This was reviewed and discussed with the patient.  Cardiac outflow tracts and lower extremities were well-visualized and were normal.  3.  Headaches.  The patient is now taking verapamil.  She feels a little bit lightheaded from time to time, but her headaches have improved.  She plans to continue verapamil   Daily Note     Today's date: 2020  Patient name: Monalisa Andersen  : 1940  MRN: [id number]  Referring provider: Calos Wells MD  Dx:   Encounter Diagnosis     ICD-10-CM    1  Closed fracture of left hip with routine healing, subsequent encounter [id number]                   Subjective: Patient reports she feels like her safety net was taken away since she isn't using her RW  Objective: See treatment diary below      Assessment: Tolerated treatment well  Patient demonstrated fatigue post treatment and would benefit from continued PT  Patient demonstrates limited static standing tolerance given increased LBP with increased walking distance  Patient with difficulty negotiating curbs >3" at this time  Min-mod A to steady with stepping down  Requires mod-max VCs for negotiation of curbs ascending  Plan: Continue per plan of care        Precautions: Falls, confusion  Discharge Date: 2020    Date    Visit Count 11 12 13 14 15   FOTO        Pain In        Pain Out                                                                           Vitals        Manuals                        Neuro Re-Ed        Rhom  1x30" on floor  2x30" on foam 2x30" on floor EO 0H  EC 1H  2x30" on foam 1H     Tandem  Modified tandem on floor 3x30" ea On floor 1H :30x2     SLS        Static heel raises 60" x 2  2 HHA                               Ther Ex        Aerobic Nustep  L3 10 min   w/UE Nustep L3  10' w/ UE Nustep L3  10' w/ UE Nustep L3  10' w/ UE Nustep L3  10' w/ UE   SLR x 3  Stand  Foam  2x10 5"  Target  1 HHA Standing on foam   2x10 //bars  Standing on foam   2x10 //bars  B/L Standing on foam   2x10 //bars  B/L Standing on foam   2x10 //bars  B/L   HR/TR Stand  Foam  2x10 5"  Target  1 HHA Stand  Foam  2x10 5"    No foam for TR Stand  Foam  2x10 5" HR    No foam for TR 2x10 Standing on foam   2x10 //bars  B/L Standing on foam   2x10 //bars  B/L   Mini Squats Leg Press  10#  1x10  15#  2x10 Leg Press 10#  1x10    minisquats 2x10  declined      minisquats 2x10  Standing on foam   2x10 //bars  B/L Standing on foam   2x10 //bars  B/L   LAQ Machine  3x10  11# extn       Bridge        *seated March  Seated marches 2x10, 5"  Seated marches 2x10, 5"      Stairs    4x3 with single HR and SPC  supervision                    Ther Activity        ADL suite training Floor transfers - 8 mins to/from floor with VCs only               Gait Training        SPC Amb with SPC outdoors inclines/declins, grass, curb with cl S to min A  amb with SPC  80 ft x1 close supervision Paced walking with RW and SPC, level surfaces, supervision with pivots using SPC  Obstacle negotiation using SPC  15 mins Amb outdoors on incles/declines, curbs, uneven surfaces, grass using SPC with CGA to mod A   Total time 18 5 mins   Stairs 4 steps, SPC and HR with supervision, 3 trials    Steps with single HR and SPC  4 x 2 with step to pattern   Modalities                          Pt was instructed to verbalize any pain/discomfort to PT during/following tx   6/1/20

## 2025-04-29 ENCOUNTER — OFFICE VISIT (OUTPATIENT)
Dept: URGENT CARE | Facility: CLINIC | Age: 85
End: 2025-04-29
Payer: COMMERCIAL

## 2025-04-29 VITALS
TEMPERATURE: 98.3 F | DIASTOLIC BLOOD PRESSURE: 62 MMHG | SYSTOLIC BLOOD PRESSURE: 139 MMHG | RESPIRATION RATE: 18 BRPM | OXYGEN SATURATION: 97 % | HEART RATE: 85 BPM

## 2025-04-29 DIAGNOSIS — Z48.02 ENCOUNTER FOR REMOVAL OF SUTURES: Primary | ICD-10-CM

## 2025-04-29 PROCEDURE — 99212 OFFICE O/P EST SF 10 MIN: CPT | Performed by: NURSE PRACTITIONER

## 2025-04-29 NOTE — PROGRESS NOTES
[facility]        NAME: Urszula Avina is a 84 y.o. female  : 1940    MRN: [id number]  DATE: 2025  TIME: 2:37 PM    Assessment and Plan   Encounter for removal of sutures [Z48.02]  1. Encounter for removal of sutures          See procedure note   F/u with hand specialist  Recently finished course of abx for infection prevention   Pt and daughter in agreement with plan of care.    Patient Instructions     Follow up with PCP in 3-5 days.  Proceed to  ER if symptoms worsen.    Chief Complaint     Chief Complaint   Patient presents with    Suture / Staple Removal     On left hand / head          History of Present Illness   Urszula Avina presents to the clinic c/o    Pt presents for suture removal -   States had sutures placed 2 weeks ago in the ER - was instructed to wait full 2 weeks for removal   Saw ortho - was supposed to f/u with hand specialist however they did not as it is an hour drive and didn't feel it was necessary   She had been picking at them and pt states she took 3 sutures out   Per ER note - 3 sutures in her left hand and one in the left forehead area.   Daughter with pt.  Pt unable to tell me what caused the injury.          Review of Systems   Review of Systems   All other systems reviewed and are negative.        Current Medications     Long-Term Medications   Medication Sig Dispense Refill    amLODIPine (NORVASC) 5 mg tablet Take 1 tablet (5 mg total) by mouth daily 30 tablet 0    benazepril (LOTENSIN) 10 mg tablet Take 1 tablet (10 mg total) by mouth daily (Patient not taking: Reported on 2025)  0    bisacodyl (DULCOLAX) 10 mg suppository Insert 1 suppository (10 mg total) into the rectum daily as needed for constipation (Patient not taking: Reported on 2025) 12 suppository 0    celecoxib (CeleBREX) 200 mg capsule Take 200 mg by mouth daily      memantine (NAMENDA) 5 mg tablet 5 mg 2 (two) times a day not taking      polyethylene glycol (MIRALAX) 17 g  packet Take 17 g by mouth daily Do not start before October 27, 2023. (Patient not taking: Reported on 4/16/2025)  0    senna-docusate sodium (SENOKOT S) 8.6-50 mg per tablet Take 2 tablets by mouth daily at bedtime  0    tazarotene (AVAGE) 0.1 % cream Apply 1 Application topically 2 (two) times a day as needed (psoriasis). Indications: Plaque Psoriasis      temazepam (RESTORIL) 30 mg capsule Take 1 capsule (30 mg total) by mouth daily at bedtime 30 capsule 0    triamcinolone (KENALOG) 0.1 % cream Apply 1 Application topically 2 (two) times a day as needed for rash (eczema). apply to dry patches of eczema as needed  Indications: Itching, Psoriasis, Skin Inflammation, eczema         Current Allergies     Allergies as of 04/29/2025 - Reviewed 04/29/2025   Allergen Reaction Noted    Penicillins  06/07/2018            The following portions of the patient's history were reviewed and updated as appropriate: allergies, current medications, past family history, past medical history, past social history, past surgical history and problem list.    Objective   /62   Pulse 85   Temp 98.3 °F (36.8 °C)   Resp 18   SpO2 97%        Physical Exam     Physical Exam  Vitals and nursing note reviewed.   Constitutional:       Appearance: Normal appearance. She is well-developed.   HENT:      Head: Normocephalic and atraumatic.      Right Ear: Hearing normal.      Left Ear: Hearing normal.      Nose: Nose normal.      Mouth/Throat:      Lips: Pink.      Mouth: Mucous membranes are moist.      Pharynx: Oropharynx is clear.   Eyes:      General: Lids are normal.      Conjunctiva/sclera: Conjunctivae normal.      Pupils: Pupils are equal, round, and reactive to light.   Cardiovascular:      Rate and Rhythm: Normal rate and regular rhythm.      Pulses: Normal pulses.      Heart sounds: S1 normal and S2 normal.   Pulmonary:      Effort: Pulmonary effort is normal.      Breath sounds: Normal breath sounds.   Abdominal:      General:  "Abdomen is flat. Bowel sounds are normal.      Palpations: Abdomen is soft.   Musculoskeletal:         General: Normal range of motion.      Cervical back: Full passive range of motion without pain, normal range of motion and neck supple.   Skin:     General: Skin is warm and dry.             Comments: 1 suture in left forehead   Neurological:      General: No focal deficit present.      Mental Status: She is alert and oriented to person, place, and time.   Psychiatric:         Mood and Affect: Mood normal.         Speech: Speech normal.         Behavior: Behavior normal. Behavior is cooperative.         Thought Content: Thought content normal.         Judgment: Judgment normal.             Suture removal    Date/Time: 4/29/2025 2:00 PM    Performed by: WAGNER Millan  Authorized by: WAGNER Millan  Universal Protocol:  procedure performed by consultantConsent: Verbal consent obtained.  Risks and benefits: risks, benefits and alternatives were discussed  Consent given by: patient and guardian (daughter is with pt.)  Time out: Immediately prior to procedure a \"time out\" was called to verify the correct patient, procedure, equipment, support staff and site/side marked as required.  Timeout called at: 4/29/2025 2:35 PM.  Patient understanding: patient states understanding of the procedure being performed  Patient consent: the patient's understanding of the procedure matches consent given  Procedure consent: procedure consent matches procedure scheduled  Relevant documents: relevant documents present and verified  Test results: test results available and properly labeled  Site marked: the operative site was marked  Radiology Images displayed and confirmed. If images not available, report reviewed: imaging studies available  Required items: required blood products, implants, devices, and special equipment available  Patient identity confirmed: verbally with patient      Patient location:  Clinic  Location:     " Laterality:  Left    Location:  Upper extremity (hadn and left forehead)    Upper extremity location:  Hand    Hand location: back of hand.  Procedure details:     Tools used:  Suture removal kit    Objective wound description: erythema, swelling, ttp.  no drainage.    Number of sutures removed:  4 (3 in the left hand and 1 in left forehead)  Post-procedure details:     Post-removal:  Antibiotic ointment applied and Band-Aid applied    Patient tolerance of procedure:  Tolerated well, no immediate complications

## (undated) DEVICE — SYRINGE 10ML LL

## (undated) DEVICE — X-RAY DETECTABLE SPONGES,16 PLY: Brand: VISTEC

## (undated) DEVICE — HEAVY DUTY TABLE COVER: Brand: CONVERTORS

## (undated) DEVICE — POV-IOD SOLUTION 4OZ BT

## (undated) DEVICE — STAPLER SKIN 35 WIDE ULC APPOSE

## (undated) DEVICE — 2.5MM REAMING ROD WITH BALL TIP/950MM-STERILE

## (undated) DEVICE — MEDI-VAC YANK SUCT HNDL W/TPRD BULBOUS TIP: Brand: CARDINAL HEALTH

## (undated) DEVICE — BULB SYRINGE,IRRIGATION WITH PROTECTIVE CAP: Brand: DOVER

## (undated) DEVICE — 3M™ STERI-DRAPE™ U-DRAPE 1015: Brand: STERI-DRAPE™

## (undated) DEVICE — TUBING SUCTION 5MM X 12 FT

## (undated) DEVICE — BLADE 10 IN

## (undated) DEVICE — SPONGE LAP 18 X 18 IN STRL RFD

## (undated) DEVICE — COBAN 4 IN STERILE

## (undated) DEVICE — SUT VICRYL 2-0 SH 27 IN UNDYED J417H

## (undated) DEVICE — NEEDLE 25G X 1 1/2

## (undated) DEVICE — 17.0MM CANNULATED DRILL BIT LARGE QC/300MM

## (undated) DEVICE — SKIN MARKER DUAL TIP WITH RULER CAP, FLEXIBLE RULER AND LABELS: Brand: DEVON

## (undated) DEVICE — 6617 IOBAN II PATIENT ISOLATION DRAPE 5/BX,4BX/CS: Brand: STERI-DRAPE™ IOBAN™ 2

## (undated) DEVICE — ABDOMINAL PAD: Brand: DERMACEA

## (undated) DEVICE — PREP SURGICAL PURPREP 26ML

## (undated) DEVICE — GLOVE SRG BIOGEL ORTHOPEDIC 7

## (undated) DEVICE — 6.0MM/10.0MM STEPPED DRILL BIT CANNULATED/LARGE QC/435MM

## (undated) DEVICE — ASTOUND IMPERVIOUS SURGICAL GOWN: Brand: CONVERTORS

## (undated) DEVICE — 4.0MM THREE-FLUTED DRILL BIT QC/195MM

## (undated) DEVICE — 3.2MM GUIDE WIRE 400MM

## (undated) DEVICE — COBAN 6 IN STERILE

## (undated) DEVICE — BETHLEHEM UNIVERSAL MINOR GEN: Brand: CARDINAL HEALTH

## (undated) DEVICE — SUT VICRYL 0 CT-1 27 IN J260H

## (undated) DEVICE — OCCLUSIVE GAUZE STRIP,3% BISMUTH TRIBROMOPHENATE IN PETROLATUM BLEND: Brand: XEROFORM

## (undated) DEVICE — GLOVE INDICATOR PI UNDERGLOVE SZ 7.5 BLUE

## (undated) DEVICE — GLOVE SRG BIOGEL 7

## (undated) DEVICE — INTENDED FOR TISSUE SEPARATION, AND OTHER PROCEDURES THAT REQUIRE A SHARP SURGICAL BLADE TO PUNCTURE OR CUT.: Brand: BARD-PARKER ® CARBON RIB-BACK BLADES